# Patient Record
Sex: MALE | Race: WHITE | NOT HISPANIC OR LATINO | Employment: FULL TIME | URBAN - METROPOLITAN AREA
[De-identification: names, ages, dates, MRNs, and addresses within clinical notes are randomized per-mention and may not be internally consistent; named-entity substitution may affect disease eponyms.]

---

## 2019-01-02 ENCOUNTER — TELEPHONE (OUTPATIENT)
Dept: INTERNAL MEDICINE | Facility: CLINIC | Age: 59
End: 2019-01-02

## 2019-01-03 ENCOUNTER — APPOINTMENT (OUTPATIENT)
Dept: GENERAL RADIOLOGY | Facility: CLINIC | Age: 59
End: 2019-01-03
Payer: COMMERCIAL

## 2019-01-03 ENCOUNTER — HOSPITAL ENCOUNTER (EMERGENCY)
Facility: CLINIC | Age: 59
Discharge: HOME | End: 2019-01-03
Attending: EMERGENCY MEDICINE | Admitting: EMERGENCY MEDICINE
Payer: COMMERCIAL

## 2019-01-03 VITALS
DIASTOLIC BLOOD PRESSURE: 82 MMHG | SYSTOLIC BLOOD PRESSURE: 115 MMHG | TEMPERATURE: 97.6 F | RESPIRATION RATE: 28 BRPM | HEART RATE: 83 BPM | OXYGEN SATURATION: 97 %

## 2019-01-03 DIAGNOSIS — J20.9 ACUTE BRONCHITIS, UNSPECIFIED ORGANISM: ICD-10-CM

## 2019-01-03 LAB
ANION GAP SERPL CALCULATED.3IONS-SCNC: 6 MMOL/L (ref 3–14)
BASOPHILS # BLD AUTO: 0.1 10E9/L (ref 0–0.2)
BASOPHILS NFR BLD AUTO: 0.7 %
BUN SERPL-MCNC: 18 MG/DL (ref 7–30)
CALCIUM SERPL-MCNC: 8.2 MG/DL (ref 8.5–10.1)
CHLORIDE SERPL-SCNC: 103 MMOL/L (ref 94–109)
CO2 SERPL-SCNC: 28 MMOL/L (ref 20–32)
CREAT SERPL-MCNC: 0.98 MG/DL (ref 0.66–1.25)
D DIMER PPP FEU-MCNC: 0.3 UG/ML FEU (ref 0–0.5)
DIFFERENTIAL METHOD BLD: NORMAL
EOSINOPHIL # BLD AUTO: 0.2 10E9/L (ref 0–0.7)
EOSINOPHIL NFR BLD AUTO: 2.3 %
ERYTHROCYTE [DISTWIDTH] IN BLOOD BY AUTOMATED COUNT: 11.9 % (ref 10–15)
GFR SERPL CREATININE-BSD FRML MDRD: 84 ML/MIN/{1.73_M2}
GLUCOSE SERPL-MCNC: 256 MG/DL (ref 70–99)
HCT VFR BLD AUTO: 44.7 % (ref 40–53)
HGB BLD-MCNC: 14.9 G/DL (ref 13.3–17.7)
IMM GRANULOCYTES # BLD: 0.1 10E9/L (ref 0–0.4)
IMM GRANULOCYTES NFR BLD: 0.5 %
INTERPRETATION ECG - MUSE: NORMAL
INTERPRETATION ECG - MUSE: NORMAL
LYMPHOCYTES # BLD AUTO: 3.1 10E9/L (ref 0.8–5.3)
LYMPHOCYTES NFR BLD AUTO: 32.3 %
MCH RBC QN AUTO: 29.6 PG (ref 26.5–33)
MCHC RBC AUTO-ENTMCNC: 33.3 G/DL (ref 31.5–36.5)
MCV RBC AUTO: 89 FL (ref 78–100)
MONOCYTES # BLD AUTO: 0.7 10E9/L (ref 0–1.3)
MONOCYTES NFR BLD AUTO: 7.7 %
NEUTROPHILS # BLD AUTO: 5.4 10E9/L (ref 1.6–8.3)
NEUTROPHILS NFR BLD AUTO: 56.5 %
NRBC # BLD AUTO: 0 10*3/UL
NRBC BLD AUTO-RTO: 0 /100
NT-PROBNP SERPL-MCNC: 16 PG/ML (ref 0–900)
PLATELET # BLD AUTO: 315 10E9/L (ref 150–450)
POTASSIUM SERPL-SCNC: 4.1 MMOL/L (ref 3.4–5.3)
RBC # BLD AUTO: 5.03 10E12/L (ref 4.4–5.9)
SODIUM SERPL-SCNC: 137 MMOL/L (ref 133–144)
TROPONIN I SERPL-MCNC: <0.015 UG/L (ref 0–0.04)
WBC # BLD AUTO: 9.5 10E9/L (ref 4–11)

## 2019-01-03 PROCEDURE — 94640 AIRWAY INHALATION TREATMENT: CPT

## 2019-01-03 PROCEDURE — 85025 COMPLETE CBC W/AUTO DIFF WBC: CPT | Performed by: EMERGENCY MEDICINE

## 2019-01-03 PROCEDURE — 99285 EMERGENCY DEPT VISIT HI MDM: CPT | Mod: 25

## 2019-01-03 PROCEDURE — 85379 FIBRIN DEGRADATION QUANT: CPT | Performed by: EMERGENCY MEDICINE

## 2019-01-03 PROCEDURE — 83880 ASSAY OF NATRIURETIC PEPTIDE: CPT | Performed by: EMERGENCY MEDICINE

## 2019-01-03 PROCEDURE — 84484 ASSAY OF TROPONIN QUANT: CPT | Performed by: EMERGENCY MEDICINE

## 2019-01-03 PROCEDURE — 80048 BASIC METABOLIC PNL TOTAL CA: CPT | Performed by: EMERGENCY MEDICINE

## 2019-01-03 PROCEDURE — 25000125 ZZHC RX 250: Performed by: EMERGENCY MEDICINE

## 2019-01-03 PROCEDURE — 93005 ELECTROCARDIOGRAM TRACING: CPT

## 2019-01-03 PROCEDURE — 71046 X-RAY EXAM CHEST 2 VIEWS: CPT

## 2019-01-03 RX ORDER — DOXYCYCLINE 100 MG/1
100 CAPSULE ORAL 2 TIMES DAILY
Qty: 14 CAPSULE | Refills: 0 | Status: SHIPPED | OUTPATIENT
Start: 2019-01-03 | End: 2019-01-10

## 2019-01-03 RX ORDER — ALBUTEROL SULFATE 90 UG/1
2 AEROSOL, METERED RESPIRATORY (INHALATION) EVERY 6 HOURS PRN
Qty: 1 INHALER | Refills: 0 | Status: SHIPPED | OUTPATIENT
Start: 2019-01-03 | End: 2019-02-12

## 2019-01-03 RX ORDER — LIDOCAINE 40 MG/G
CREAM TOPICAL
Status: DISCONTINUED | OUTPATIENT
Start: 2019-01-03 | End: 2019-01-03 | Stop reason: HOSPADM

## 2019-01-03 RX ORDER — ALBUTEROL SULFATE 0.83 MG/ML
2.5 SOLUTION RESPIRATORY (INHALATION) ONCE
Status: COMPLETED | OUTPATIENT
Start: 2019-01-03 | End: 2019-01-03

## 2019-01-03 RX ADMIN — ALBUTEROL SULFATE 2.5 MG: 2.5 SOLUTION RESPIRATORY (INHALATION) at 02:36

## 2019-01-03 ASSESSMENT — ENCOUNTER SYMPTOMS
COUGH: 1
CHEST TIGHTNESS: 1
SHORTNESS OF BREATH: 1

## 2019-01-03 NOTE — ED TRIAGE NOTES
Here for sob started couple days, worst at night. Taking advil cold/sinus with mild relief. Coughing with yellow phlegm. Per patient, has 65% lung capacity, diaphragm not working well. Stated mid upper chest/neck feels tight, feeling strangled. ABCs intact.

## 2019-01-03 NOTE — ED AVS SNAPSHOT
[facility] Emergency Department  [address]  [facility] [address]  Phone:  [phone]  Fax:  [phone]                                    Donis Barraza   MRN: [id number]    Department:  [facility] Emergency Department   Date of Visit:  1/3/2019           After Visit Summary Signature Page    I have received my discharge instructions, and my questions have been answered. I have discussed any challenges I see with this plan with the nurse or doctor.    ..........................................................................................................................................  Patient/Patient Representative Signature      ..........................................................................................................................................  Patient Representative Print Name and Relationship to Patient    ..................................................               ................................................  Date                                   Time    ..........................................................................................................................................  Reviewed by Signature/Title    ...................................................              ..............................................  Date                                               Time          22EPIC Rev 08/18

## 2019-01-03 NOTE — ED PROVIDER NOTES
History     Chief Complaint:  Shortness of Breath    HPI   Donis Barraza is a 58 year old male with a medical history of a hemidiaphragm paralysis who presents with shortness of breath. The patient reports an onset of chest tightness with the sensation of chocking and not being able to get in enough air. Symptoms worse when lying down. He endorses cough. Patient initially tried to go to Urgent Care, but was advised to come to the emergency department for further evaluation and treatment. Denies leg swelling.     Allergies:  No known drug allergies      Medications:    The patient is currently on no regular medications.     Past Medical History:    Hemidiaphragm paralysis - right   Rheumatic fever    Past Surgical History:    Appendectomy  Shoulder surgery - trauma  Tonsillectomy/adenoidectomy    Family History:    Diabetes  Cardiovascular  Eye disorder  Gastrointestinal disease  MI    Social History:  Smoking status: Never smoker  Alcohol use: Yes    PCP: Ethan Casas    Marital Status:   [2]    Review of Systems   Respiratory: Positive for cough, chest tightness and shortness of breath.    Cardiovascular: Negative for leg swelling.   All other systems reviewed and are negative.      Physical Exam   Patient Vitals for the past 24 hrs:   BP Temp Temp src Pulse Resp SpO2   01/03/19 0445 -- -- -- -- -- 97 %   01/03/19 0440 -- -- -- -- -- 95 %   01/03/19 0435 115/82 -- -- 83 -- 98 %   01/03/19 0415 -- -- -- -- -- 93 %   01/03/19 0410 -- -- -- -- -- 96 %   01/03/19 0405 -- -- -- -- -- 94 %   01/03/19 0400 135/91 -- -- 82 -- 98 %   01/03/19 0143 157/108 97.6  F (36.4  C) Oral 102 28 97 %      Physical Exam  Nursing note and vitals reviewed.  Constitutional: Cooperative.   HENT:   Mouth/Throat: Mucous membranes are normal.   Cardiovascular: Normal rate, regular rhythm and normal heart sounds.  No murmur.  Pulmonary/Chest: Tachypneic with increased work of breathing. Decreased breath sounds and the right base.  No rales.   Abdominal: Soft. Normal appearance. There is no tenderness.   Musculoskeletal: No edema of the legs.   Neurological: Alert.   Skin: Skin is warm and dry.   Psychiatric: Normal mood and affect.      Emergency Department Course   Imaging:  Radiographic findings were communicated with the patient who voiced understanding of the findings.    Chest XR, PA & LAT  No acute abnormality.  As read by Radiology.     Laboratory:  CBC: WNL (WBC 9.5, HGB 14.9, )   BMP: Glucose 256 (H), Calcium 8.2 (L) WNL (Creatinine 0.98)    BNP: 16  Troponin (0236): <0.015     D-dimer: 0.3    Interventions:  0236: Proventil 2.5 mg nebulization      Emergency Department Course:  Past medical records, nursing notes, and vitals reviewed.  0222: I performed an exam of the patient and obtained history, as documented above.    0236: Peripheral IV established. Blood drawn for basic laboratory. Troponin and D-dimer obtained. Results as noted above.    Patient was given the above interventions while here in the emergency department.   The patient was sent for a chest XR while in the emergency department, findings above.   0443: I rechecked the patient. Patient feels better after nebulizer.   Patient discharged home with instructions regarding supportive care, medications, and reasons to return. The importance of close follow-up was reviewed.      Impression & Plan    Medical Decision Making:  Donis Barraza is a 58 year old gentleman with known hemidiaphragm paralysis who presents with proactive cough for the last 13 days. He's afebrile and no hypoxia here. After albuterol nebulization, he feels much better. Cardiac markers are normal, speaking against heart failure. Dimer is negative making pulmonary embolism unlikely and chest X-ray is otherwise unremarkable. At this time, given the time course of his symptoms it's reasonable to treat with antibiotics for coverage of atypical bacterial infection. Given his improvement with beta  agonist I would also start albuterol as an outpatient.      Diagnosis:    ICD-10-CM   1. Acute bronchitis, unspecified organism J20.9     Disposition:  discharged to home    Discharge Medications:  albuterol 108 (90 Base) MCG/ACT inhaler  Commonly known as:  PROAIR HFA/PROVENTIL HFA/VENTOLIN HFA  2 puffs, Inhalation, EVERY 6 HOURS PRN     doxycycline hyclate 100 MG capsule  Commonly known as:  VIBRAMYCIN  100 mg, Oral, 2 TIMES DAILY       Nancy Avila  1/3/2019   [facility] EMERGENCY DEPARTMENT  I, Nancy Avila, am serving as a scribe at 2:22 AM on 1/3/2019 to document services personally performed by Noel Reyes MD based on my observations and the provider's statements to me.       Noel Reyes MD  01/03/19 0615

## 2019-01-21 ENCOUNTER — ANCILLARY PROCEDURE (OUTPATIENT)
Dept: GENERAL RADIOLOGY | Facility: CLINIC | Age: 59
End: 2019-01-21
Payer: COMMERCIAL

## 2019-01-21 ENCOUNTER — OFFICE VISIT (OUTPATIENT)
Dept: INTERNAL MEDICINE | Facility: CLINIC | Age: 59
End: 2019-01-21
Payer: COMMERCIAL

## 2019-01-21 VITALS
HEART RATE: 102 BPM | DIASTOLIC BLOOD PRESSURE: 80 MMHG | HEIGHT: 67 IN | OXYGEN SATURATION: 97 % | RESPIRATION RATE: 26 BRPM | SYSTOLIC BLOOD PRESSURE: 110 MMHG | TEMPERATURE: 98.3 F | WEIGHT: 253.8 LBS | BODY MASS INDEX: 39.83 KG/M2

## 2019-01-21 DIAGNOSIS — J21.9 ACUTE BRONCHIOLITIS WITH BRONCHOSPASM: Primary | ICD-10-CM

## 2019-01-21 DIAGNOSIS — R05.9 COUGH: ICD-10-CM

## 2019-01-21 DIAGNOSIS — J20.9 ACUTE BRONCHITIS, UNSPECIFIED ORGANISM: ICD-10-CM

## 2019-01-21 PROCEDURE — 71046 X-RAY EXAM CHEST 2 VIEWS: CPT

## 2019-01-21 PROCEDURE — 99214 OFFICE O/P EST MOD 30 MIN: CPT | Performed by: INTERNAL MEDICINE

## 2019-01-21 RX ORDER — PREDNISONE 10 MG/1
TABLET ORAL
Qty: 13 TABLET | Refills: 0 | Status: SHIPPED | OUTPATIENT
Start: 2019-01-21 | End: 2019-02-12

## 2019-01-21 RX ORDER — GUAIFENESIN 600 MG/1
1200 TABLET, EXTENDED RELEASE ORAL 2 TIMES DAILY
Qty: 40 TABLET | Refills: 0 | Status: SHIPPED | OUTPATIENT
Start: 2019-01-21 | End: 2019-01-31

## 2019-01-21 RX ORDER — CODEINE PHOSPHATE AND GUAIFENESIN 10; 100 MG/5ML; MG/5ML
1-2 SOLUTION ORAL EVERY 4 HOURS PRN
Qty: 250 ML | Refills: 0 | Status: SHIPPED | OUTPATIENT
Start: 2019-01-21 | End: 2019-02-12

## 2019-01-21 RX ORDER — CEFDINIR 300 MG/1
300 CAPSULE ORAL 2 TIMES DAILY
Qty: 20 CAPSULE | Refills: 0 | Status: SHIPPED | OUTPATIENT
Start: 2019-01-21 | End: 2019-01-31

## 2019-01-21 RX ORDER — ALBUTEROL SULFATE 1.25 MG/3ML
1.25 SOLUTION RESPIRATORY (INHALATION) EVERY 6 HOURS PRN
Qty: 40 VIAL | Refills: 1 | Status: SHIPPED | OUTPATIENT
Start: 2019-01-21 | End: 2019-02-20

## 2019-01-21 ASSESSMENT — MIFFLIN-ST. JEOR: SCORE: 1934.86

## 2019-01-21 NOTE — PROGRESS NOTES
"Dr Leal's note      Patient's instructions / PLAN:                                                      Plan:  1. Cefnidir 300 mg twice a day for 10 days - antibiotic   2. Mucinex 1200 mg twice a day   3. Robitussin with Codeine 1-2 teaspoons every 4-6 hours as needed for severe cough   4. Albuterol nebulizer  5. Prednisone 10 mg - take it in the morning  -- day 1-4, take 2 tablets daily  -- day 5-8, take 1 tab daily  -- day 9-10 take 1/2 tab daily then stop   6. Make an appointment for physical exam               ASSESSMENT & PLAN:                                                      (J21.9) Acute bronchiolitis with bronchospasm  (primary encounter diagnosis)  Comment: I reviewed the CXR myself: no pneumonia, but high R diaphragm   Plan: XR Chest 2 Views, cefdinir (OMNICEF) 300 MG         capsule, guaiFENesin (MUCINEX) 600 MG 12 hr         tablet, order for DME, guaiFENesin-codeine         (ROBITUSSIN AC) 100-10 MG/5ML solution,         albuterol (ACCUNEB) 1.25 MG/3ML neb solution,         predniSONE (DELTASONE) 10 MG tablet            (R05) Cough  Comment:   Plan: XR Chest 2 Views               Chief complaint:                                                           SUBJECTIVE:   Donis Barraza is a 58 year old male who presents to clinic today for the following health issues:    Cough  Symptoms started before Paulette.  Seen in ER and he felt better in ER after nebs. Doxyc and Alb inh were presribed   He was prescribed ALbuterol inh but he doesn't find much help  He has been using \" too many\" OTC meds with no help    Some yellow phlegm, some white phlegm          *URI-New pt. [place] like he had improved somewhat while on the doxycycline, but feels like he is going downhill again. Has been using albuterol (doesn't feel like it's helping), was taking a lot of Advil cold and sinus (that seemed to help, but he felt like he was taking too much of it).     FYI-his son is in the hospital right now with similar " "symptoms, and has had to have 4 Liters fluid removed from his abdominal area and they are worried about fluid accumulation around the heart (says there is no hx of liver or heart issues).           Review of Systems:                                                      ROS: negative for fever, chills, cough, wheezes, chest pain, shortness of breath, vomiting, abdominal pain, leg swelling pos for cough, SOB, wheezes , as above     A 10-point review of systems was obtained.  Those pertinent are above and in the in the Subjective section.  The rest of the systems are negative.           OBJECTIVE:             Physical exam:  Blood pressure 110/80, pulse 102, temperature 98.3  F (36.8  C), temperature source Oral, resp. rate 26, height 1.702 m (5' 7\"), weight 115.1 kg (253 lb 12.8 oz), SpO2 97 %.   NAD, appears comfortable  Skin: no rashes   HEENT: PERRLA, EOMI, pink conjunctiva, anicteric sclerae, bilateral tympanic membranes are clinically normal, oropharynx is normal color  Neck: supple, no JVD,  No thyroidmegaly. Lymph nodes nonpalpable cervical and supraclavicular.  Chest:  decreased breath sounds R base, he has paralysed diaphragm here, very freq cough, sounds dry   Heart: S1 S2, RRR, no mgr appreciated  Abdomen: soft, not tender, no hepatosplenomegaly or masses appreciated, no abdominal bruit, present bowel sounds  Extremities: no edema,   Neurologic: A, Ox3, no focal signs appreciated    PMHx: reviewed  Past Medical History:   Diagnosis Date     h/o Rheumatic fever      Hemidiaphragm paralysis - right       PSHx: reviewed  Past Surgical History:   Procedure Laterality Date     C APPENDECTOMY  1980's     SHOULDER SURGERY Left 2007    s/p trauma     TONSILLECTOMY, ADENOIDECTOMY, COMBINED          Meds: reviewed  Current Outpatient Medications   Medication Sig Dispense Refill     albuterol (PROAIR HFA/PROVENTIL HFA/VENTOLIN HFA) 108 (90 Base) MCG/ACT inhaler Inhale 2 puffs into the lungs every 6 hours as needed for " shortness of breath / dyspnea or wheezing 1 Inhaler 0     NO ACTIVE MEDICATIONS          Soc Hx: reviewed  Saji Hx: reviewed          Malini Leal MD  Internal Medicine

## 2019-01-21 NOTE — NURSING NOTE
"/80 (BP Location: Right arm, Patient Position: Sitting, Cuff Size: Adult Large)   Pulse 102   Temp 98.3  F (36.8  C) (Oral)   Resp 26   Ht 1.702 m (5' 7\")   Wt 115.1 kg (253 lb 12.8 oz)   SpO2 97%   BMI 39.75 kg/m    Colonoscopy discussed-he has family history of polyps (dad and two brothers), he is unsure of when his last colonoscopy was done. Pt advised this would be recommended for him.  Brenda Le CMA    "

## 2019-01-21 NOTE — PATIENT INSTRUCTIONS
Plan:  1. Cefnidir 300 mg twice a day for 10 days - antibiotic   2. Mucinex 1200 mg twice a day   3. Robitussin with Codeine 1-2 teaspoons every 4-6 hours as needed for severe cough   4. Albuterol nebulizer  5. Prednisone 10 mg - take it in the morning  -- day 1-4, take 2 tablets daily  -- day 5-8, take 1 tab daily  -- day 9-10 take 1/2 tab daily then stop   6. Make an appointment for physical exam

## 2019-02-02 ENCOUNTER — NURSE TRIAGE (OUTPATIENT)
Dept: NURSING | Facility: CLINIC | Age: 59
End: 2019-02-02

## 2019-02-02 ENCOUNTER — OFFICE VISIT (OUTPATIENT)
Dept: FAMILY MEDICINE | Facility: CLINIC | Age: 59
End: 2019-02-02
Payer: COMMERCIAL

## 2019-02-02 VITALS
HEIGHT: 68 IN | OXYGEN SATURATION: 98 % | BODY MASS INDEX: 38.49 KG/M2 | TEMPERATURE: 97.9 F | HEART RATE: 90 BPM | RESPIRATION RATE: 18 BRPM | SYSTOLIC BLOOD PRESSURE: 110 MMHG | DIASTOLIC BLOOD PRESSURE: 80 MMHG | WEIGHT: 254 LBS

## 2019-02-02 DIAGNOSIS — Z12.5 SCREENING FOR PROSTATE CANCER: ICD-10-CM

## 2019-02-02 DIAGNOSIS — R06.02 SOB (SHORTNESS OF BREATH): ICD-10-CM

## 2019-02-02 DIAGNOSIS — E66.01 CLASS 2 SEVERE OBESITY DUE TO EXCESS CALORIES WITH SERIOUS COMORBIDITY AND BODY MASS INDEX (BMI) OF 38.0 TO 38.9 IN ADULT (H): ICD-10-CM

## 2019-02-02 DIAGNOSIS — E66.812 CLASS 2 SEVERE OBESITY DUE TO EXCESS CALORIES WITH SERIOUS COMORBIDITY AND BODY MASS INDEX (BMI) OF 38.0 TO 38.9 IN ADULT (H): ICD-10-CM

## 2019-02-02 DIAGNOSIS — Z12.11 SPECIAL SCREENING FOR MALIGNANT NEOPLASMS, COLON: ICD-10-CM

## 2019-02-02 DIAGNOSIS — J40 BRONCHITIS: Primary | ICD-10-CM

## 2019-02-02 DIAGNOSIS — E11.9 TYPE 2 DIABETES MELLITUS WITHOUT COMPLICATION, WITHOUT LONG-TERM CURRENT USE OF INSULIN (H): ICD-10-CM

## 2019-02-02 DIAGNOSIS — J01.90 SUBACUTE SINUSITIS, UNSPECIFIED LOCATION: ICD-10-CM

## 2019-02-02 DIAGNOSIS — J98.6 DIAPHRAGM PARALYSIS: ICD-10-CM

## 2019-02-02 DIAGNOSIS — Z11.59 NEED FOR HEPATITIS C SCREENING TEST: ICD-10-CM

## 2019-02-02 DIAGNOSIS — Z78.9 NONSMOKER: ICD-10-CM

## 2019-02-02 DIAGNOSIS — E78.2 MIXED HYPERLIPIDEMIA: ICD-10-CM

## 2019-02-02 LAB
BASOPHILS # BLD AUTO: 0 10E9/L (ref 0–0.2)
BASOPHILS NFR BLD AUTO: 0.4 %
DIFFERENTIAL METHOD BLD: NORMAL
EOSINOPHIL # BLD AUTO: 0.1 10E9/L (ref 0–0.7)
EOSINOPHIL NFR BLD AUTO: 1.3 %
ERYTHROCYTE [DISTWIDTH] IN BLOOD BY AUTOMATED COUNT: 12.5 % (ref 10–15)
HBA1C MFR BLD: 8.2 % (ref 0–5.6)
HCT VFR BLD AUTO: 43.9 % (ref 40–53)
HGB BLD-MCNC: 15 G/DL (ref 13.3–17.7)
LYMPHOCYTES # BLD AUTO: 2.5 10E9/L (ref 0.8–5.3)
LYMPHOCYTES NFR BLD AUTO: 28.3 %
MCH RBC QN AUTO: 29.4 PG (ref 26.5–33)
MCHC RBC AUTO-ENTMCNC: 34.2 G/DL (ref 31.5–36.5)
MCV RBC AUTO: 86 FL (ref 78–100)
MONOCYTES # BLD AUTO: 0.6 10E9/L (ref 0–1.3)
MONOCYTES NFR BLD AUTO: 7.1 %
NEUTROPHILS # BLD AUTO: 5.7 10E9/L (ref 1.6–8.3)
NEUTROPHILS NFR BLD AUTO: 62.9 %
PLATELET # BLD AUTO: 234 10E9/L (ref 150–450)
RBC # BLD AUTO: 5.1 10E12/L (ref 4.4–5.9)
WBC # BLD AUTO: 9 10E9/L (ref 4–11)

## 2019-02-02 PROCEDURE — G0103 PSA SCREENING: HCPCS | Performed by: FAMILY MEDICINE

## 2019-02-02 PROCEDURE — 80048 BASIC METABOLIC PNL TOTAL CA: CPT | Performed by: FAMILY MEDICINE

## 2019-02-02 PROCEDURE — 80061 LIPID PANEL: CPT | Performed by: FAMILY MEDICINE

## 2019-02-02 PROCEDURE — 84460 ALANINE AMINO (ALT) (SGPT): CPT | Performed by: FAMILY MEDICINE

## 2019-02-02 PROCEDURE — 86803 HEPATITIS C AB TEST: CPT | Performed by: FAMILY MEDICINE

## 2019-02-02 PROCEDURE — 36415 COLL VENOUS BLD VENIPUNCTURE: CPT | Performed by: FAMILY MEDICINE

## 2019-02-02 PROCEDURE — 99215 OFFICE O/P EST HI 40 MIN: CPT | Performed by: FAMILY MEDICINE

## 2019-02-02 PROCEDURE — 83036 HEMOGLOBIN GLYCOSYLATED A1C: CPT | Performed by: FAMILY MEDICINE

## 2019-02-02 PROCEDURE — 85025 COMPLETE CBC W/AUTO DIFF WBC: CPT | Performed by: FAMILY MEDICINE

## 2019-02-02 RX ORDER — BUDESONIDE 1 MG/2ML
1 INHALANT ORAL 2 TIMES DAILY
Qty: 60 AMPULE | Refills: 1 | Status: SHIPPED | OUTPATIENT
Start: 2019-02-02 | End: 2020-05-27

## 2019-02-02 ASSESSMENT — MIFFLIN-ST. JEOR: SCORE: 1951.64

## 2019-02-02 NOTE — PATIENT INSTRUCTIONS
1.  Run a cold air vaporizer as much as possible. If you cannot,  boil water and breath the warm vapors 2-3 times a day to try to open up the sinuses take 2400mgm of guaifenesin per 24 hours   You can do this by taking  Mucinex plain blue  1200 mg  One tablet twice a day (This may come as 600mg/tablet and you need to take 2 tabs twice a day) or you could buy the cheaper  generic 400mgm / tab and take 2 tablets 3 x a day or 1 and 1/2 tablets 4 x a day . .Guaifenesin is  the major component of most cough syrups, because it makes the mucus less thick, and therefore it drains out better and you are less likely to cough from it dripping on the back of your throat.  Irrigate the  nose with plain water under the kitchen sink faucet or the shower.  Coty pots, spray bottles, etc accumulate bacteria and are not recommended.   The tickle in the throat is also helped by gargling with vinegar and honey mixture, or pop or mouth wash as these coat the throat.  Please try to rinse teeth with water after using these .   Do not use sudafed or pseudephedrine as it dries the mucus up so it is harder to get it out, and it can raise your BP     2.  Weight Loss Tips  1. Do not eat after 6 hrs before your expected bedtime  2. Have your heaviest meal for breakfast, a slightly lighter meal at lunch and a snack 6 hrs before bed  3. No sugar/calorie drinks except milk ie no fruit juice, pop, alcohol.  4. Drink milk 30min before meals to decrease your hunger. Also it is excellent as part of your last meal of the day snack  5. Drink lots of water  6. Increase fiber in diet: all bran cereal, salads, popcorn etc  7. Have only one small serving of fruit a day about 1/2 cup (as this is high in sugar)  8. EXERCISE is the bottom line. Without it, you will gain weight even on a low calorie diet. Best if done 2-3X a day as can    Being overweight contributes to high blood pressure and high cholesterol, both of which cause heart attacks, strokes and  kidney failure, prediabetes and diabetes, arthritis, and liver disease     3. Shingrex is a 2 shot series that prevents shingles 97% of the time, as opposed to the old shingles shot that only prevented it at 40-50%  It costs less for medicare at a pharmacy  You should get it starting at 50 yrs old get the 2nd shot 5-6 mo after the first one    4. Cont to use the albuterol at least 2 a time per day up to 4 x a day     Follow the am & pm albuterol with the budesonide ( steroid)

## 2019-02-02 NOTE — NURSING NOTE
"Chief Complaint   Patient presents with     URI     /80   Pulse 90   Temp 97.9  F (36.6  C) (Tympanic)   Resp 18   Ht 1.727 m (5' 8\")   Wt 115.2 kg (254 lb)   SpO2 98%   BMI 38.62 kg/m   Estimated body mass index is 38.62 kg/m  as calculated from the following:    Height as of this encounter: 1.727 m (5' 8\").    Weight as of this encounter: 115.2 kg (254 lb).  BP completed using cuff size: shawanda Carcamo CMA    Health Maintenance Due   Topic Date Due     PHQ-2 Q1 YR  03/22/1973     HIV SCREEN (SYSTEM ASSIGNED)  03/22/1979     HEPATITIS C SCREENING  03/22/1979     ZOSTER IMMUNIZATION (1 of 2) 03/22/2011     LIPID SCREENING Q5 YEARS  05/24/2018     Health Maintenance reviewed at today's visit patient asked to schedule/complete:   Depression:  Patient agrees to schedule  Immunizations:  Patient agrees to schedule    "

## 2019-02-02 NOTE — TELEPHONE ENCOUNTER
Reason for call;  Productive cough with coughing spells that caused Shortness of breath and  and stuffy nose since Christmas .   Seen on 1/21/19 Dx Bronchitolitis W Bronchospasm @ [facility]  .   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    Has  4 doses left of Albuterol Neb solution  Left  .  Currently :  no fever , but  pushing fluids - 1&0 is ok , sinus pressure and mild pain  and stuffy nose .  Reviewed office visit plan and discuss and Pt would like to have appointment to evaluate sinus pressure/ pain and stuffy nose and see if anything further could be done for him .   Recommendation / teaching ; Sent to .      Caller Verbalizes understanding and denies further questions and will call back if  Triage requested  or questions  .  Ira Chaves RN  - [place] Nurse Advisor

## 2019-02-02 NOTE — LETTER
February 6, 2019      Donis Barraza  [address] [facility] [address]        Dear ,    We are writing to inform you of your test results.    All of your lab results are normal, except as noted below.     The following are explanations of some of our lab tests     THIS DOES NOT MEAN THAT YOU HAD ALL OF THESE DONE     Please continue on the same medications unless   a change is noted above     These are some general explanations for tests:     Hgb is the blood iron level   WBC means White Blood Cells   Platelets are small blood cells that help with forming the blood clots along with other blood factors.   Electrolytes ar   e Sodium, Potassium, Calcium, Magnesium, Phosphorus.   Liver tests are: AST, ALT, Bilirubin, Alkaline Phosphatase.   Kidney tests are Creatinine, GFR.   HDL Cholesterol - is the good cholesterol and it is good to have it high.   LDL cholesterol is the bad ch   olesterol and it is good to have it low.   It is recommended to have LDL less than 130 for people with hypertension and to have it less than 100 for people with heart disease, diabetes and chronic kidney disease.   Triglycerides are another type of lipid a   nd can also cause heart disease so should be kept low.     ###These lipids or cholesterols are too high  We need to discuss them ###     Thyroid tests are TSH, T4, T3   Glucose is sugar   A1c is a test that gives us an idea about how well was controlled the diabetes for the last 3 months.   ###your diabetes is out of control###we need to discuss this and where we want to go from here ###     PSA stands for Prostate Specific Antigen and    it can be elevated with prostate cancer or prostate inflammation    Resulted Orders   Hepatitis C Screen Reflex to HCV RNA Quant and Genotype   Result Value Ref Range    Hepatitis C Antibody Nonreactive NR^Nonreactive      Comment:      Assay performance characteristics have not been established for newborns,   infants, and children      Lipid panel reflex to direct LDL Fasting   Result Value Ref Range    Cholesterol 195 <200 mg/dL    Triglycerides 211 (H) <150 mg/dL      Comment:      Borderline high:  150-199 mg/dl  High:             200-499 mg/dl  Very high:       >499 mg/dl  Non Fasting      HDL Cholesterol 36 (L) >39 mg/dL    LDL Cholesterol Calculated 117 (H) <100 mg/dL      Comment:      Above desirable:  100-129 mg/dl  Borderline High:  130-159 mg/dL  High:             160-189 mg/dL  Very high:       >189 mg/dl      Non HDL Cholesterol 159 (H) <130 mg/dL      Comment:      Above Desirable:  130-159 mg/dl  Borderline high:  160-189 mg/dl  High:             190-219 mg/dl  Very high:       >219 mg/dl     Basic metabolic panel   Result Value Ref Range    Sodium 137 133 - 144 mmol/L    Potassium 4.5 3.4 - 5.3 mmol/L    Chloride 103 94 - 109 mmol/L    Carbon Dioxide 29 20 - 32 mmol/L    Anion Gap 5 3 - 14 mmol/L    Glucose 144 (H) 70 - 99 mg/dL      Comment:      Non Fasting    Urea Nitrogen 18 7 - 30 mg/dL    Creatinine 0.92 0.66 - 1.25 mg/dL    GFR Estimate >90 >60 mL/min/[1.73_m2]      Comment:      Non  GFR Calc  Starting 12/18/2018, serum creatinine based estimated GFR (eGFR) will be   calculated using the Chronic Kidney Disease Epidemiology Collaboration   (CKD-EPI) equation.      GFR Estimate If Black >90 >60 mL/min/[1.73_m2]      Comment:       GFR Calc  Starting 12/18/2018, serum creatinine based estimated GFR (eGFR) will be   calculated using the Chronic Kidney Disease Epidemiology Collaboration   (CKD-EPI) equation.      Calcium 9.0 8.5 - 10.1 mg/dL   CBC with platelets differential   Result Value Ref Range    WBC 9.0 4.0 - 11.0 10e9/L    RBC Count 5.10 4.4 - 5.9 10e12/L    Hemoglobin 15.0 13.3 - 17.7 g/dL    Hematocrit 43.9 40.0 - 53.0 %    MCV 86 78 - 100 fl    MCH 29.4 26.5 - 33.0 pg    MCHC 34.2 31.5 - 36.5 g/dL    RDW 12.5 10.0 - 15.0 %    Platelet Count 234 150 - 450 10e9/L    % Neutrophils 62.9 %     % Lymphocytes 28.3 %    % Monocytes 7.1 %    % Eosinophils 1.3 %    % Basophils 0.4 %    Absolute Neutrophil 5.7 1.6 - 8.3 10e9/L    Absolute Lymphocytes 2.5 0.8 - 5.3 10e9/L    Absolute Monocytes 0.6 0.0 - 1.3 10e9/L    Absolute Eosinophils 0.1 0.0 - 0.7 10e9/L    Absolute Basophils 0.0 0.0 - 0.2 10e9/L    Diff Method Automated Method    ALT   Result Value Ref Range    ALT 41 0 - 70 U/L   Hemoglobin A1c   Result Value Ref Range    Hemoglobin A1C 8.2 (H) 0 - 5.6 %      Comment:      Normal <5.7% Prediabetes 5.7-6.4%  Diabetes 6.5% or higher - adopted from ADA   consensus guidelines.     Prostate spec antigen screen   Result Value Ref Range    PSA 0.43 0 - 4 ug/L      Comment:      Assay Method:  Chemiluminescence using Siemens Vista analyzer       If you have any questions or concerns, please call the clinic at the number listed above.       Sincerely,        Bere Middleton MD

## 2019-02-02 NOTE — PROGRESS NOTES
"  SUBJECTIVE:   Donis Barraza is a 58 year old male who presents to clinic today for the following health issues:    ENT Symptoms             Symptoms: cc Present Absent Comment   Fever/Chills   x    Fatigue   x    Muscle Aches   x    Eye Irritation   x    Sneezing   x    Nasal Bautista/Drg  x     Sinus Pressure/Pain  x     Loss of smell   x    Dental pain   x    Sore Throat   x    Swollen Glands   x    Ear Pain/Fullness   x    Cough  x     Wheeze   x    Chest Pain   x    Shortness of breath  x     Rash   x    Other   x      Symptom duration:  11/2018   Symptom severity:  Severe   Treatments tried:  Mucinex, Advil, Nebulizer, Robitussin, Cefnidir and Prednisone start 10mgm [id number]--no improvement   Prednisone-->irritable and hyper    Contacts:  Son-hospital till 2-1-19--29 y/o pericarditis   PFTs ? 2011? Showed \"65% lung volume\" per pt   Denies recurent bronchitis but was bad enough to be tested then   ELEVATED RT HEMIDIAPHRAGM  -post fall in 2006    NONMORBID OBESITY    -morbid with comorbid DM, hi LDL   -sedentary   -BMI=39    Diabetes -DX today with A1C= 8.2      Patient is checking blood sugars: not at all    Random glu = 255 on 1-3-19    Diabetic concerns: None     Symptoms of hypoglycemia (low blood sugar): none     Paresthesias (numbness or burning in feet) or sores: No     Date of last diabetic eye exam: ?    BP Readings from Last 2 Encounters:   02/02/19 110/80   01/21/19 110/80     Hemoglobin A1C (%)   Date Value   02/02/2019 8.2 (H)     LDL Cholesterol Calculated (mg/dL)   Date Value   05/24/2013 157 (H)   02/11/2008 164 (H)       Diabetes Management Resources  Mixed Hyperlipidemia Follow-Up      Rate your low fat/cholesterol diet?: not monitoring fat    Taking statin?  No    Other lipid medications/supplements?:  None    psat yrwk=736&HTJ=392 in 2013      Problem list and histories reviewed & adjusted, as indicated.  Additional history: as documented    Labs reviewed in EPIC    Reviewed and updated as " "needed this visit by clinical staff  Tobacco  Allergies  Meds  Problems  Med Hx  Surg Hx  Fam Hx  Soc Hx        Reviewed and updated as needed this visit by Provider  Tobacco  Allergies  Meds  Problems  Med Hx  Surg Hx  Fam Hx         ROS:  CONSTITUTIONAL: NEGATIVE for fever, chills, change in weight  INTEGUMENTARY/SKIN: NEGATIVE for worrisome rashes, moles or lesions  EYES: NEGATIVE for vision changes or irritation  ENT/MOUTH: POSITIVE for}, hoarse voice, nasal congestion, postnasal drainage and rhinorrhea-clear  RESP:POSITIVE for , cough-productive and dyspnea on exertion  BREAST: NEGATIVE for masses, tenderness or discharge  CV: NEGATIVE for chest pain, palpitations or peripheral edema  GI: NEGATIVE for nausea, abdominal pain, heartburn, or change in bowel habits  : NEGATIVE for frequency, dysuria, or hematuria  MUSCULOSKELETAL: NEGATIVE for significant arthralgias or myalgia  NEURO: NEGATIVE for weakness, dizziness or paresthesias  ENDOCRINE: NEGATIVE for temperature intolerance, skin/hair changes  HEME: NEGATIVE for bleeding problems  PSYCHIATRIC: NEGATIVE for changes in mood or affect    OBJECTIVE:     /80   Pulse 90   Temp 97.9  F (36.6  C) (Tympanic)   Resp 18   Ht 1.727 m (5' 8\")   Wt 115.2 kg (254 lb)   SpO2 98%   BMI 38.62 kg/m    Body mass index is 38.62 kg/m .  GENERAL: healthy, alert, no distress and fatigued  EYES: Eyes grossly normal to inspection, PERRL and conjunctivae and sclerae normal  HENT: ear canals and TM's normal, nose and mouth without ulcers or lesions  POS long uvula  NECK: no adenopathy, no asymmetry, masses, or scars and thyroid normal to palpation  RESP: lungs clear to auscultation - no rales, rhonchi or wheezes POS barrel chested--cough clears the lungs   CV: regular rate and rhythm, normal S1 S2, no S3 or S4, no murmur, click or rub, no peripheral edema and peripheral pulses strong  MS: no gross musculoskeletal defects noted, no edema  SKIN: no " suspicious lesions or rashes  NEURO: Normal strength and tone, mentation intact and speech normal  PSYCH: mentation appears normal, affect normal/bright  LYMPH: no cervical, supraclavicular, axillary, or inguinal adenopathy    Diagnostic Test Results:  Results for orders placed or performed in visit on 02/02/19   CBC with platelets differential   Result Value Ref Range    WBC 9.0 4.0 - 11.0 10e9/L    RBC Count 5.10 4.4 - 5.9 10e12/L    Hemoglobin 15.0 13.3 - 17.7 g/dL    Hematocrit 43.9 40.0 - 53.0 %    MCV 86 78 - 100 fl    MCH 29.4 26.5 - 33.0 pg    MCHC 34.2 31.5 - 36.5 g/dL    RDW 12.5 10.0 - 15.0 %    Platelet Count 234 150 - 450 10e9/L    % Neutrophils 62.9 %    % Lymphocytes 28.3 %    % Monocytes 7.1 %    % Eosinophils 1.3 %    % Basophils 0.4 %    Absolute Neutrophil 5.7 1.6 - 8.3 10e9/L    Absolute Lymphocytes 2.5 0.8 - 5.3 10e9/L    Absolute Monocytes 0.6 0.0 - 1.3 10e9/L    Absolute Eosinophils 0.1 0.0 - 0.7 10e9/L    Absolute Basophils 0.0 0.0 - 0.2 10e9/L    Diff Method Automated Method    Hemoglobin A1c   Result Value Ref Range    Hemoglobin A1C 8.2 (H) 0 - 5.6 %       ASSESSMENT/PLAN:               ICD-10-CM    1. Bronchitis subacute onset 11-18 J40 CBC with platelets differential     PULMONARY MEDICINE REFERRAL   2. Nonsmoker Z78.9    3. 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 Subacute sinusitis, unspecified location J01.90    5. SOB (shortness of breath) R06.02 CBC with platelets differential     budesonide (PULMICORT) 1 MG/2ML neb solution     PULMONARY MEDICINE REFERRAL   6. Type 2 diabetes mellitus without complication, without long-term current use of insulin (H)-new Dx  w hyperglycemia  E11.9    7. Mixed hyperlipidemia E78.2 Lipid panel reflex to direct LDL Fasting     ALT   8. Class 2 severe obesity due to excess calories with serious comorbidity and body mass index (BMI) of 38.0 to 38.9 in adult (H) E66.01 ALT    Z68.38    9. Special screening for malignant neoplasms, colon  Z12.11 Fecal colorectal cancer screen FIT   10. Screening for prostate cancer Z12.5 Prostate spec antigen screen   11. Need for hepatitis C screening test Z11.59 Hepatitis C Screen Reflex to HCV RNA Quant and Genotype       Patient Instructions   1.  Run a cold air vaporizer as much as possible. If you cannot,  boil water and breath the warm vapors 2-3 times a day to try to open up the sinuses take 2400mgm of guaifenesin per 24 hours   You can do this by taking  Mucinex plain blue  1200 mg  One tablet twice a day (This may come as 600mg/tablet and you need to take 2 tabs twice a day) or you could buy the cheaper  generic 400mgm / tab and take 2 tablets 3 x a day or 1 and 1/2 tablets 4 x a day . .Guaifenesin is  the major component of most cough syrups, because it makes the mucus less thick, and therefore it drains out better and you are less likely to cough from it dripping on the back of your throat.  Irrigate the  nose with plain water under the kitchen sink faucet or the shower.  Coty pots, spray bottles, etc accumulate bacteria and are not recommended.   The tickle in the throat is also helped by gargling with vinegar and honey mixture, or pop or mouth wash as these coat the throat.  Please try to rinse teeth with water after using these .   Do not use sudafed or pseudephedrine as it dries the mucus up so it is harder to get it out, and it can raise your BP     2.  Weight Loss Tips  1. Do not eat after 6 hrs before your expected bedtime  2. Have your heaviest meal for breakfast, a slightly lighter meal at lunch and a snack 6 hrs before bed  3. No sugar/calorie drinks except milk ie no fruit juice, pop, alcohol.  4. Drink milk 30min before meals to decrease your hunger. Also it is excellent as part of your last meal of the day snack  5. Drink lots of water  6. Increase fiber in diet: all bran cereal, salads, popcorn etc  7. Have only one small serving of fruit a day about 1/2 cup (as this is high in sugar)  8.  "EXERCISE is the bottom line. Without it, you will gain weight even on a low calorie diet. Best if done 2-3X a day as can    Being overweight contributes to high blood pressure and high cholesterol, both of which cause heart attacks, strokes and kidney failure, prediabetes and diabetes, arthritis, and liver disease     3. Shingrex is a 2 shot series that prevents shingles 97% of the time, as opposed to the old shingles shot that only prevented it at 40-50%  It costs less for medicare at a pharmacy  You should get it starting at 50 yrs old get the 2nd shot 5-6 mo after the first one    4. Cont to use the albuterol at least 2 a time per day up to 4 x a day     Follow the am & pm albuterol with the budesonide ( steroid)       Bere Middleton MD  [facility]    Weight management plan: Discussed healthy diet and exercise guidelines    Time spent with the patient 45mins, more than 50% in counseling and coordinating care, Re above medical problems.    1. BRONCHITIS   Onset 11-18  -denies URIS in past but must have as had:  -\"lung function at 65%\" in past ?2011  -nonsmoker  -paralyzed and elevated Rt diaphragm but not enough to cause problems   -no  Help with abx and low dose (10mgm ) prednisone for 10 d  -CXR wnl 1-21-19  --will need PFTs when get him under better control   -pt refuses prednisone so will give per neb    2. REACTION TO PREDNISONE  -felt irritable and wakeful on it   -refuses to take more   -was only on a low dose, starting at 10mgm   -will do by nebs     ,I  Explained the treatment and the reason for it   As per above instructions    3. HI GLUCOSE --> DIABETES   -per todays A1C = 8.4  -pt told in past \"was\" or would become diabetic   -contributes to chronic infection eg respiratory    -comorbid obesity & hi LDL     -needs further Rx of all these              Bere Middleton MD    "

## 2019-02-04 LAB
ALT SERPL W P-5'-P-CCNC: 41 U/L (ref 0–70)
ANION GAP SERPL CALCULATED.3IONS-SCNC: 5 MMOL/L (ref 3–14)
BUN SERPL-MCNC: 18 MG/DL (ref 7–30)
CALCIUM SERPL-MCNC: 9 MG/DL (ref 8.5–10.1)
CHLORIDE SERPL-SCNC: 103 MMOL/L (ref 94–109)
CHOLEST SERPL-MCNC: 195 MG/DL
CO2 SERPL-SCNC: 29 MMOL/L (ref 20–32)
CREAT SERPL-MCNC: 0.92 MG/DL (ref 0.66–1.25)
GFR SERPL CREATININE-BSD FRML MDRD: >90 ML/MIN/{1.73_M2}
GLUCOSE SERPL-MCNC: 144 MG/DL (ref 70–99)
HCV AB SERPL QL IA: NONREACTIVE
HDLC SERPL-MCNC: 36 MG/DL
LDLC SERPL CALC-MCNC: 117 MG/DL
NONHDLC SERPL-MCNC: 159 MG/DL
POTASSIUM SERPL-SCNC: 4.5 MMOL/L (ref 3.4–5.3)
PSA SERPL-ACNC: 0.43 UG/L (ref 0–4)
SODIUM SERPL-SCNC: 137 MMOL/L (ref 133–144)
TRIGL SERPL-MCNC: 211 MG/DL

## 2019-02-06 NOTE — RESULT ENCOUNTER NOTE
Please see attached lab results  All of your lab results are normal, except as noted below.    The following are explanations of some of our lab tests    THIS DOES NOT MEAN THAT YOU HAD ALL OF THESE DONE    Please continue on the same medications unless   a change is noted above    These are some general explanations for tests:    Hgb is the blood iron level  WBC means White Blood Cells  Platelets are small blood cells that help with forming the blood clots along with other blood factors.  Electrolytes ar  e Sodium, Potassium, Calcium, Magnesium, Phosphorus.  Liver tests are: AST, ALT, Bilirubin, Alkaline Phosphatase.  Kidney tests are Creatinine, GFR.  HDL Cholesterol - is the good cholesterol and it is good to have it high.  LDL cholesterol is the bad ch  olesterol and it is good to have it low.  It is recommended to have LDL less than 130 for people with hypertension and to have it less than 100 for people with heart disease, diabetes and chronic kidney disease.  Triglycerides are another type of lipid a  nd can also cause heart disease so should be kept low.    ###These lipids or cholesterols are too high  We need to discuss them ###    Thyroid tests are TSH, T4, T3  Glucose is sugar  A1c is a test that gives us an idea about how well was controlled the diabetes for the last 3 months.   ###your diabetes is out of control###we need to discuss this and where we want to go from here ###    PSA stands for Prostate Specific Antigen and   it can be elevated with prostate cancer or prostate inflammation.

## 2019-02-12 ENCOUNTER — OFFICE VISIT (OUTPATIENT)
Dept: FAMILY MEDICINE | Facility: CLINIC | Age: 59
End: 2019-02-12
Payer: COMMERCIAL

## 2019-02-12 VITALS
WEIGHT: 255 LBS | TEMPERATURE: 98.3 F | OXYGEN SATURATION: 97 % | DIASTOLIC BLOOD PRESSURE: 80 MMHG | HEART RATE: 93 BPM | RESPIRATION RATE: 18 BRPM | BODY MASS INDEX: 40.02 KG/M2 | SYSTOLIC BLOOD PRESSURE: 130 MMHG | HEIGHT: 67 IN

## 2019-02-12 DIAGNOSIS — E11.9 TYPE 2 DIABETES MELLITUS WITHOUT COMPLICATION, WITHOUT LONG-TERM CURRENT USE OF INSULIN (H): ICD-10-CM

## 2019-02-12 DIAGNOSIS — E66.812 CLASS 2 SEVERE OBESITY DUE TO EXCESS CALORIES WITH SERIOUS COMORBIDITY AND BODY MASS INDEX (BMI) OF 38.0 TO 38.9 IN ADULT (H): ICD-10-CM

## 2019-02-12 DIAGNOSIS — E78.2 MIXED HYPERLIPIDEMIA: ICD-10-CM

## 2019-02-12 DIAGNOSIS — R91.8 ABNORMAL CT SCAN OF LUNG: ICD-10-CM

## 2019-02-12 DIAGNOSIS — E66.01 CLASS 2 SEVERE OBESITY DUE TO EXCESS CALORIES WITH SERIOUS COMORBIDITY AND BODY MASS INDEX (BMI) OF 38.0 TO 38.9 IN ADULT (H): ICD-10-CM

## 2019-02-12 DIAGNOSIS — Z13.89 SCREENING FOR DIABETIC PERIPHERAL NEUROPATHY: ICD-10-CM

## 2019-02-12 DIAGNOSIS — R09.02 HYPOXIA: ICD-10-CM

## 2019-02-12 DIAGNOSIS — J98.6 DIAPHRAGM PARALYSIS: ICD-10-CM

## 2019-02-12 DIAGNOSIS — Z86.0100 HISTORY OF COLONIC POLYPS: ICD-10-CM

## 2019-02-12 DIAGNOSIS — J40 BRONCHITIS: Primary | ICD-10-CM

## 2019-02-12 DIAGNOSIS — Z78.9 NONSMOKER: ICD-10-CM

## 2019-02-12 DIAGNOSIS — K76.0 FATTY LIVER: ICD-10-CM

## 2019-02-12 PROCEDURE — 99207 C FOOT EXAM  NO CHARGE: CPT | Mod: 25 | Performed by: FAMILY MEDICINE

## 2019-02-12 PROCEDURE — 99214 OFFICE O/P EST MOD 30 MIN: CPT | Performed by: FAMILY MEDICINE

## 2019-02-12 PROCEDURE — 82043 UR ALBUMIN QUANTITATIVE: CPT | Performed by: FAMILY MEDICINE

## 2019-02-12 RX ORDER — SIMVASTATIN 20 MG
20 TABLET ORAL AT BEDTIME
Qty: 90 TABLET | Refills: 0 | Status: SHIPPED | OUTPATIENT
Start: 2019-02-12 | End: 2019-05-15

## 2019-02-12 ASSESSMENT — MIFFLIN-ST. JEOR: SCORE: 1940.3

## 2019-02-12 NOTE — PATIENT INSTRUCTIONS
1.  Weight Loss Tips  1.#### Do not eat after 6 hrs before your expected bedtime  2. Have your heaviest meal for breakfast, a slightly lighter meal at lunch and a snack 6 hrs before bed  3. No sugar/calorie drinks except milk ie no fruit juice, pop, alcohol.  4. Drink milk 30min before meals to decrease your hunger. Also it is excellent as part of your last meal of the day snack  5. Drink lots of water  6. Increase fiber in diet: all bran cereal, salads, popcorn etc  7. Have only one small serving of fruit a day about 1/2 cup (as this is high in sugar)  8. EXERCISE is the bottom line. Without it, you will gain weight even on a low calorie diet. Best if done 2-3X a day as can    Being overweight contributes to high blood pressure and high cholesterol, both of which cause heart attacks, strokes and kidney failure, prediabetes and diabetes, arthritis, and liver disease     2. See an eye DOCTOR --it is important with diabetes     3. Shingrex is a 2 shot series that prevents shingles 97% of the time, as opposed to the old shingles shot that only prevented it at 40-50%  It costs less for medicare at a pharmacy  You should get it starting at 50 yrs old get the 2nd shot 5-6 mo after the first one    4. The only way known to prevent diabetes or keep it from getting worse is exercise, 20-40 minutes 3 times a day around the time of meals as your insulin is wearing out  You need to get rid of the sugar using your muscles     5. chek with insurance to see if they cover diabetic education     6. No difference was  Noted by patients in a double blind study when given codeine, tylenol ( acetaminophen) or ibuprofen (all in identical pills). They felt no difference in pain relief. Since ibuprofen and the NSAIDs  causes kidney damage, esophageal damage with heartburn, and can increase the risk of esophageal and stomach cancer and ulcers,and colonic strictures. They also cause increased risk of heart attack .   I recommend that you use  tylenol(acetaminophen) for pain. Use the acetaminophen ES  Which has 500mgm/tablet You can take up to 2 tablets 4 times a day as need for pain.  If this is not enough, you can add in ibuprofen or aleve(naprosyn) with 2 glasses of fluid and some food-to protect the stomach and esophagus. Please let us know if you are continuing to take ibuprofen or aleve, as we will need to periodically check your kidney function with a blood test.    7. Do the labs in 6-8 weeks , then see me in 2 days   The prescription(s) have been sent to your pharmacy electronically and the future lab has been ordered. Please call us at [phone] to make a lab appointment.

## 2019-02-12 NOTE — PROGRESS NOTES
"  SUBJECTIVE:   Donis Barraza is a 58 year old male who presents to clinic today for the following health issues:    Diabetes -DX on 2-2-19  with A1C= 8.2      Patient is checking blood sugars: not at all    Random glu = 255 on 1-3-19    Prior  FBSsaround 90     Diabetic concerns: None     Symptoms of hypoglycemia (low blood sugar): none     Paresthesias (numbness or burning in feet) or sores: No     Date of last diabetic eye exam: ?    BP Readings from Last 2 Encounters:   02/02/19 110/80   01/21/19 110/80     Hemoglobin A1C (%)   Date Value   02/02/2019 8.2 (H)     LDL Cholesterol Calculated (mg/dL)   Date Value   05/24/2013 157 (H)   02/11/2008 164 (H)   Diabetes Management Resources    Mixed Hyperlipidemia Follow-Up      Rate your low fat/cholesterol diet?: not monitoring fat    Taking statin?  No-started today     Other lipid medications/supplements?:  None    psat [id number]=614 in 2-19    NONMORBID OBESITY      -morbid with comorbid DM, hi LDL     -sedentary     -BMI=39    -Fatty Liver / CT 5-13 --with wnl LFTs     ENT Symptoms             Symptoms: cc Present Absent Comment   Fever/Chills   x    Fatigue   x    Muscle Aches   x    Eye Irritation   x    Sneezing   x    Nasal Bautista/Drg  x     Sinus Pressure/Pain  x     Loss of smell   x    Dental pain   x    Sore Throat   x    Swollen Glands   x    Ear Pain/Fullness   x    Cough  x     Wheeze   x    Chest Pain   x    Shortness of breath  x     Rash   x    Other   x      Symptom duration:  11/2018   Symptom severity:  Severe   Treatments tried:  Mucinex, Advil, Nebulizer, Robitussin, Cefnidir and Prednisone start 10mgm il1-24-22--no improvement   Prednisone-->irritable and hyper    Contacts:  Son-hospital till 2-1-19--30 y/o pericarditis   PFTs ? 2011? Showed \"65% lung volume\" per pt   Denies recurent bronchitis but was bad enough to be tested then   1-19 CXR = neg except for the diaphragm   ABNORMAL CT  With dye 5-13 : lung nodule- lingula - recommended 6 mo " "f/u CT   Nonsmoker     ELEVATED RT HEMIDIAPHRAGM  -with atelectasis   -post fall in 2006    HYPOXIA   -95-97% with OCCAS 99% since 2013 ( 1st record )     MORBID OBESITY    -BMI = 39   -COMORBID DM , fatty liver, r/o COPD   --sedentary life style incl work    FATTY LIVER     -per 5-13 CT   -is obese   -normal LFTs     COLON POLYP-    - 2-08 per colonoscopy -  -was to redo it in 5 yrs       Amount of exercise or physical activity: None    Problems taking medications regularly: No    Medication side effects: none    Diet: regular (no restrictions)        Problem list and histories reviewed & adjusted, as indicated.  Additional history: as documented    Labs reviewed in EPIC    Reviewed and updated as needed this visit by clinical staff  Tobacco  Allergies  Meds  Problems  Med Hx  Surg Hx  Fam Hx  Soc Hx        Reviewed and updated as needed this visit by Provider  Tobacco  Allergies  Meds  Problems  Med Hx  Surg Hx  Fam Hx         ROS:  CONSTITUTIONAL: NEGATIVE for fever, chills, change in weight  INTEGUMENTARY/SKIN: NEGATIVE for worrisome rashes, moles or lesions  EYES: NEGATIVE for vision changes or irritation  ENT/MOUTH: NEGATIVE for ear, mouth and throat problems  RESP:POSITIVE for dyspnea on exertion and cough-non productive  BREAST: NEGATIVE for masses, tenderness or discharge  CV: NEGATIVE for chest pain, palpitations or peripheral edema  GI: NEGATIVE for nausea, abdominal pain, heartburn, or change in bowel habits  : NEGATIVE for frequency, dysuria, or hematuria  MUSCULOSKELETAL: NEGATIVE for significant arthralgias or myalgia  NEURO: NEGATIVE for weakness, dizziness or paresthesias  ENDOCRINE: NEGATIVE for temperature intolerance, skin/hair changes  HEME: NEGATIVE for bleeding problems  PSYCHIATRIC: NEGATIVE for changes in mood or affect    OBJECTIVE:     /80   Pulse 93   Temp 98.3  F (36.8  C) (Tympanic)   Resp 18   Ht 1.702 m (5' 7\")   Wt 115.7 kg (255 lb)   SpO2 97%   BMI 39.94 " kg/m    Body mass index is 39.94 kg/m .  GENERAL: healthy, alert, no distress, obese and fatigued  EYES: Eyes grossly normal to inspection, PERRL and conjunctivae and sclerae normal  RESP: lungs clear to auscultation - no rales, rhonchi or wheezes  CV: regular rate and rhythm, normal S1 S2, no S3 or S4, no murmur, click or rub, no peripheral edema and peripheral pulses strong  MS: no gross musculoskeletal defects noted, no edema  SKIN: no suspicious lesions or rashes  Diabetic Foot Exam: No sores, ulcers, erthematous pressure areas; good DP& PT pulses and normal capillary filling time ;normal sensation to monofilament  testing   NEURO: Normal strength and tone, mentation intact and speech normal  PSYCH: mentation appears normal, affect normal/bright  LYMPH: Negative CCOA nodes and thyroid and inguinal nodes       Diagnostic Test Results:  Results for orders placed or performed in visit on 02/02/19   Hepatitis C Screen Reflex to HCV RNA Quant and Genotype   Result Value Ref Range    Hepatitis C Antibody Nonreactive NR^Nonreactive   Lipid panel reflex to direct LDL Fasting   Result Value Ref Range    Cholesterol 195 <200 mg/dL    Triglycerides 211 (H) <150 mg/dL    HDL Cholesterol 36 (L) >39 mg/dL    LDL Cholesterol Calculated 117 (H) <100 mg/dL    Non HDL Cholesterol 159 (H) <130 mg/dL   Basic metabolic panel   Result Value Ref Range    Sodium 137 133 - 144 mmol/L    Potassium 4.5 3.4 - 5.3 mmol/L    Chloride 103 94 - 109 mmol/L    Carbon Dioxide 29 20 - 32 mmol/L    Anion Gap 5 3 - 14 mmol/L    Glucose 144 (H) 70 - 99 mg/dL    Urea Nitrogen 18 7 - 30 mg/dL    Creatinine 0.92 0.66 - 1.25 mg/dL    GFR Estimate >90 >60 mL/min/[1.73_m2]    GFR Estimate If Black >90 >60 mL/min/[1.73_m2]    Calcium 9.0 8.5 - 10.1 mg/dL   CBC with platelets differential   Result Value Ref Range    WBC 9.0 4.0 - 11.0 10e9/L    RBC Count 5.10 4.4 - 5.9 10e12/L    Hemoglobin 15.0 13.3 - 17.7 g/dL    Hematocrit 43.9 40.0 - 53.0 %    MCV 86 78  - 100 fl    MCH 29.4 26.5 - 33.0 pg    MCHC 34.2 31.5 - 36.5 g/dL    RDW 12.5 10.0 - 15.0 %    Platelet Count 234 150 - 450 10e9/L    % Neutrophils 62.9 %    % Lymphocytes 28.3 %    % Monocytes 7.1 %    % Eosinophils 1.3 %    % Basophils 0.4 %    Absolute Neutrophil 5.7 1.6 - 8.3 10e9/L    Absolute Lymphocytes 2.5 0.8 - 5.3 10e9/L    Absolute Monocytes 0.6 0.0 - 1.3 10e9/L    Absolute Eosinophils 0.1 0.0 - 0.7 10e9/L    Absolute Basophils 0.0 0.0 - 0.2 10e9/L    Diff Method Automated Method    ALT   Result Value Ref Range    ALT 41 0 - 70 U/L   Hemoglobin A1c   Result Value Ref Range    Hemoglobin A1C 8.2 (H) 0 - 5.6 %   Prostate spec antigen screen   Result Value Ref Range    PSA 0.43 0 - 4 ug/L       ASSESSMENT/PLAN:               ICD-10-CM    1. Bronchitis subacute onset 11-18 J40    2. 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 Hypoxia since 2013 R09.02    4. Nonsmoker Z78.9    5. Abnormal CT scan of lung 5-13 lingular nodule  R91.8    6. Type 2 diabetes mellitus without complication, without long-term current use of insulin (H)-new Dx  w hyperglycemia  E11.9 Albumin Random Urine Quantitative with Creat Ratio     metFORMIN (GLUCOPHAGE) 500 MG tablet     Hemoglobin A1c     TSH with free T4 reflex     CANCELED: TSH WITH FREE T4 REFLEX   7. Mixed hyperlipidemia E78.2 simvastatin (ZOCOR) 20 MG tablet     Lipid panel reflex to direct LDL Fasting     ALT   8. History of colonic polyps one per 2-08 w fam hx same Z86.010 GASTROENTEROLOGY ADULT REF PROCEDURE ONLY Other; [facility] [phone]   9. Fatty liver per 5-13 CT  K76.0    10. Class 2 severe obesity due to excess calories with serious comorbidity and body mass index (BMI) of 38.0 to 38.9 in adult (H) E66.01 ALT    Z68.38    11. Screening for diabetic peripheral neuropathy Z13.89 FOOT EXAM  NO CHARGE [57780.237]       Patient Instructions   1.  Weight Loss Tips  1.#### Do not eat after 6 hrs before your expected bedtime  2. Have your heaviest meal for  breakfast, a slightly lighter meal at lunch and a snack 6 hrs before bed  3. No sugar/calorie drinks except milk ie no fruit juice, pop, alcohol.  4. Drink milk 30min before meals to decrease your hunger. Also it is excellent as part of your last meal of the day snack  5. Drink lots of water  6. Increase fiber in diet: all bran cereal, salads, popcorn etc  7. Have only one small serving of fruit a day about 1/2 cup (as this is high in sugar)  8. EXERCISE is the bottom line. Without it, you will gain weight even on a low calorie diet. Best if done 2-3X a day as can    Being overweight contributes to high blood pressure and high cholesterol, both of which cause heart attacks, strokes and kidney failure, prediabetes and diabetes, arthritis, and liver disease     2. See an eye DOCTOR --it is important with diabetes     3. Shingrex is a 2 shot series that prevents shingles 97% of the time, as opposed to the old shingles shot that only prevented it at 40-50%  It costs less for medicare at a pharmacy  You should get it starting at 50 yrs old get the 2nd shot 5-6 mo after the first one    4. The only way known to prevent diabetes or keep it from getting worse is exercise, 20-40 minutes 3 times a day around the time of meals as your insulin is wearing out  You need to get rid of the sugar using your muscles     5. chek with insurance to see if they cover diabetic education     6. No difference was  Noted by patients in a double blind study when given codeine, tylenol ( acetaminophen) or ibuprofen (all in identical pills). They felt no difference in pain relief. Since ibuprofen and the NSAIDs  causes kidney damage, esophageal damage with heartburn, and can increase the risk of esophageal and stomach cancer and ulcers,and colonic strictures. They also cause increased risk of heart attack .   I recommend that you use tylenol(acetaminophen) for pain. Use the acetaminophen ES  Which has 500mgm/tablet You can take up to 2 tablets  4 times a day as need for pain.  If this is not enough, you can add in ibuprofen or aleve(naprosyn) with 2 glasses of fluid and some food-to protect the stomach and esophagus. Please let us know if you are continuing to take ibuprofen or aleve, as we will need to periodically check your kidney function with a blood test.    7. Do the labs in 6-8 weeks , then see me in 2 days   The prescription(s) have been sent to your pharmacy electronically and the future lab has been ordered. Please call us at [phone] to make a lab appointment.     Pt has some other questions re joint etc pains and should rtc as he desires re these  He was 24 min late for today's appt-->  will need to cover these at a future appt                                                                                      Bere Middleton MD  [facility]    Pt started on metformin and statin today   Will need an ACE on rtc   Also needs spirometry on rtc as should be better by then   Need to order the f/u CT of lungs recommended for the lingular nodule In  5-13     His SOB and hypoxia are from his obesity but also may be developing COPD  And needs further work up   Weight management plan: Discussed healthy diet and exercise guidelines    Bere Middleton MD

## 2019-02-12 NOTE — NURSING NOTE
"Chief Complaint   Patient presents with     Recheck Medication     /80   Pulse 93   Temp 98.3  F (36.8  C) (Tympanic)   Resp 18   Ht 1.702 m (5' 7\")   Wt 115.7 kg (255 lb)   SpO2 97%   BMI 39.94 kg/m   Estimated body mass index is 39.94 kg/m  as calculated from the following:    Height as of this encounter: 1.702 m (5' 7\").    Weight as of this encounter: 115.7 kg (255 lb).  BP completed using cuff size: larry Carcamo CMA    Health Maintenance Due   Topic Date Due     FOOT EXAM Q1 YEAR  03/22/1961     EYE EXAM Q1 YEAR  03/22/1961     MICROALBUMIN Q1 YEAR  [birth year]/22/1961     HIV SCREEN (SYSTEM ASSIGNED)  03/22/1979     ZOSTER IMMUNIZATION (1 of 2) 03/22/2011     TSH W/ FREE T4 REFLEX Q2 YEAR  05/20/2015     Health Maintenance reviewed at today's visit patient asked to schedule/complete:   Diabetes:  Patient agrees to schedule  Immunizations:  Patient agrees to schedule    "

## 2019-02-12 NOTE — LETTER
February 14, 2019      Donis Barraza  [address] [facility] [id number]        Dear ,    We are writing to inform you of your test results.    Normal kidneys!     Resulted Orders   Albumin Random Urine Quantitative with Creat Ratio   Result Value Ref Range    Creatinine Urine 239 mg/dL    Albumin Urine mg/L 11 mg/L    Albumin Urine mg/g Cr 4.44 0 - 17 mg/g Cr       If you have any questions or concerns, please call the clinic at the number listed above.       Sincerely,        Bere Middleton MD

## 2019-02-13 LAB
CREAT UR-MCNC: 239 MG/DL
MICROALBUMIN UR-MCNC: 11 MG/L
MICROALBUMIN/CREAT UR: 4.44 MG/G CR (ref 0–17)

## 2019-02-19 DIAGNOSIS — J21.9 ACUTE BRONCHIOLITIS WITH BRONCHOSPASM: ICD-10-CM

## 2019-02-19 NOTE — TELEPHONE ENCOUNTER
"Requested Prescriptions   Pending Prescriptions Disp Refills     albuterol (ACCUNEB) 1.25 MG/3ML neb solution  Last Written Prescription Date:  01/21/19  Last Fill Quantity: 40 vials,  # refills: 1   Last office visit: 1/21/2019 with prescribing provider:  01/21/19   Future Office Visit:     40 vial 1     Sig: Take 1 vial (1.25 mg) by nebulization every 6 hours as needed for shortness of breath / dyspnea or wheezing    Asthma Maintenance Inhalers - Anticholinergics Passed - 2/19/2019  8:12 AM       Passed - Patient is age 12 years or older       Passed - Recent (12 mo) or future (30 days) visit within the authorizing provider's specialty    Patient had office visit in the last 12 months or has a visit in the next 30 days with authorizing provider or within the authorizing provider's specialty.  See \"Patient Info\" tab in inbasket, or \"Choose Columns\" in Meds & Orders section of the refill encounter.             Passed - Medication is active on med list          "

## 2019-02-20 RX ORDER — ALBUTEROL SULFATE 1.25 MG/3ML
1.25 SOLUTION RESPIRATORY (INHALATION) EVERY 6 HOURS PRN
Qty: 40 VIAL | Refills: 0 | Status: SHIPPED | OUTPATIENT
Start: 2019-02-20 | End: 2020-05-27

## 2019-02-20 NOTE — TELEPHONE ENCOUNTER
Prescription approved per [facility].  Liseth Thapa RN

## 2019-02-21 ENCOUNTER — TRANSFERRED RECORDS (OUTPATIENT)
Dept: HEALTH INFORMATION MANAGEMENT | Facility: CLINIC | Age: 59
End: 2019-02-21

## 2019-02-21 LAB — RETINOPATHY: NEGATIVE

## 2019-05-15 DIAGNOSIS — E78.2 MIXED HYPERLIPIDEMIA: ICD-10-CM

## 2019-05-15 DIAGNOSIS — E11.9 TYPE 2 DIABETES MELLITUS WITHOUT COMPLICATION, WITHOUT LONG-TERM CURRENT USE OF INSULIN (H): ICD-10-CM

## 2019-05-16 RX ORDER — SIMVASTATIN 20 MG
TABLET ORAL
Qty: 30 TABLET | Refills: 0 | Status: SHIPPED | OUTPATIENT
Start: 2019-05-16 | End: 2020-05-27

## 2019-05-16 NOTE — TELEPHONE ENCOUNTER
"Requested Prescriptions   Pending Prescriptions Disp Refills     metFORMIN (GLUCOPHAGE) 500 MG tablet [Pharmacy Med Name: METFORMIN 500MG TABLETS]  Last Written Prescription Date:  2/12/2019  Last Fill Quantity: 180 tablet,  # refills: 0   Last office visit: 2/12/2019 with prescribing provider:  MAKAYLA Middleton   Future Office Visit:     180 tablet 0     Sig: TAKE 1 TABLET BY MOUTH TWICE DAILY WITH MEALS       Biguanide Agents Failed - 5/15/2019  9:11 PM        Failed - Patient has documented A1c within the specified period of time.     If HgbA1C is 8 or greater, it needs to be on file within the past 3 months.  If less than 8, must be on file within the past 6 months.     Recent Labs   Lab Test 02/02/19  1123   A1C 8.2*             Passed - Blood pressure less than 140/90 in past 6 months     BP Readings from Last 3 Encounters:   02/12/19 130/80   02/02/19 110/80   01/21/19 110/80                 Passed - Patient has documented LDL within the past 12 mos.     Recent Labs   Lab Test 02/02/19  1123   *             Passed - Patient has had a Microalbumin in the past 15 mos.     Recent Labs   Lab Test 02/12/19  0955   MICROL 11   UMALCR 4.44             Passed - Patient is age 10 or older        Passed - Patient's CR is NOT>1.4 OR Patient's EGFR is NOT<45 within past 12 mos.     Recent Labs   Lab Test 02/02/19  1123   GFRESTIMATED >90   GFRESTBLACK >90       Recent Labs   Lab Test 02/02/19  1123   CR 0.92             Passed - Patient does NOT have a diagnosis of CHF.        Passed - Medication is active on med list        Passed - Recent (6 mo) or future (30 days) visit within the authorizing provider's specialty     Patient had office visit in the last 6 months or has a visit in the next 30 days with authorizing provider or within the authorizing provider's specialty.  See \"Patient Info\" tab in inbasket, or \"Choose Columns\" in Meds & Orders section of the refill encounter.            simvastatin (ZOCOR) 20 MG " "tablet [Pharmacy Med Name: SIMVASTATIN 20MG TABLETS]  Last Written Prescription Date:  2/12/2019  Last Fill Quantity: 90 tablet,  # refills: 0   Last office visit: 2/12/2019 with prescribing provider:  LUKE Middleton   Future Office Visit:     90 tablet 0     Sig: TAKE 1 TABLET BY MOUTH EVERY NIGHT AT BEDTIME       Statins Protocol Passed - 5/15/2019  9:11 PM        Passed - LDL on file in past 12 months     Recent Labs   Lab Test 02/02/19  1123   *             Passed - No abnormal creatine kinase in past 12 months     No lab results found.             Passed - Recent (12 mo) or future (30 days) visit within the authorizing provider's specialty     Patient had office visit in the last 12 months or has a visit in the next 30 days with authorizing provider or within the authorizing provider's specialty.  See \"Patient Info\" tab in inbasket, or \"Choose Columns\" in Meds & Orders section of the refill encounter.              Passed - Medication is active on med list        Passed - Patient is age 18 or older           "

## 2019-06-17 DIAGNOSIS — E11.9 TYPE 2 DIABETES MELLITUS WITHOUT COMPLICATION, WITHOUT LONG-TERM CURRENT USE OF INSULIN (H): ICD-10-CM

## 2019-06-17 DIAGNOSIS — E78.2 MIXED HYPERLIPIDEMIA: ICD-10-CM

## 2019-06-17 NOTE — TELEPHONE ENCOUNTER
"Requested Prescriptions   Pending Prescriptions Disp Refills     simvastatin (ZOCOR) 20 MG tablet [Pharmacy Med Name: SIMVASTATIN 20MG TABLETS]  Last Written Prescription Date:  5/16/2019  Last Fill Quantity: 30 tablet,  # refills: 0   Last office visit: 2/12/2019 with prescribing provider:  MAKAYLA Middleton   Future Office Visit:     30 tablet 0     Sig: TAKE 1 TABLET BY MOUTH EVERY NIGHT AT BEDTIME       Statins Protocol Passed - 6/17/2019 10:24 AM        Passed - LDL on file in past 12 months     Recent Labs   Lab Test 02/02/19  1123   *             Passed - No abnormal creatine kinase in past 12 months     No lab results found.             Passed - Recent (12 mo) or future (30 days) visit within the authorizing provider's specialty     Patient had office visit in the last 12 months or has a visit in the next 30 days with authorizing provider or within the authorizing provider's specialty.  See \"Patient Info\" tab in inbasket, or \"Choose Columns\" in Meds & Orders section of the refill encounter.              Passed - Medication is active on med list        Passed - Patient is age 18 or older        metFORMIN (GLUCOPHAGE) 500 MG tablet [Pharmacy Med Name: METFORMIN 500MG TABLETS]  Last Written Prescription Date:  5/16/2019  Last Fill Quantity: 60 tablet,  # refills: 0   Last office visit: 2/12/2019 with prescribing provider:  MAKAYLA Middleton   Future Office Visit:     60 tablet 0     Sig: TAKE 1 TABLET BY MOUTH TWICE DAILY WITH MEALS       Biguanide Agents Failed - 6/17/2019 10:24 AM        Failed - Patient has documented A1c within the specified period of time.     If HgbA1C is 8 or greater, it needs to be on file within the past 3 months.  If less than 8, must be on file within the past 6 months.     Recent Labs   Lab Test 02/02/19  1123   A1C 8.2*             Passed - Blood pressure less than 140/90 in past 6 months     BP Readings from Last 3 Encounters:   02/12/19 130/80   02/02/19 110/80   01/21/19 110/80 " "                Passed - Patient has documented LDL within the past 12 mos.     Recent Labs   Lab Test 02/02/19  1123   *             Passed - Patient has had a Microalbumin in the past 15 mos.     Recent Labs   Lab Test 02/12/19  0955   MICROL 11   UMALCR 4.44             Passed - Patient is age 10 or older        Passed - Patient's CR is NOT>1.4 OR Patient's EGFR is NOT<45 within past 12 mos.     Recent Labs   Lab Test 02/02/19  1123   GFRESTIMATED >90   GFRESTBLACK >90       Recent Labs   Lab Test 02/02/19  1123   CR 0.92             Passed - Patient does NOT have a diagnosis of CHF.        Passed - Medication is active on med list        Passed - Recent (6 mo) or future (30 days) visit within the authorizing provider's specialty     Patient had office visit in the last 6 months or has a visit in the next 30 days with authorizing provider or within the authorizing provider's specialty.  See \"Patient Info\" tab in inbasket, or \"Choose Columns\" in Meds & Orders section of the refill encounter.               "

## 2019-06-18 RX ORDER — SIMVASTATIN 20 MG
TABLET ORAL
Qty: 30 TABLET | Refills: 0 | OUTPATIENT
Start: 2019-06-18

## 2019-06-18 NOTE — TELEPHONE ENCOUNTER
Patient states that he is out of simvastatin and is feeling better off of it. He said he hurts all over while taking this.     He is calling back to schedule a lab only appointment to check A1c and fasting LDL.     Hold off on filling simvastatin. Routing request for metformin to provider to approve.

## 2019-12-24 ENCOUNTER — TELEPHONE (OUTPATIENT)
Dept: FAMILY MEDICINE | Facility: CLINIC | Age: 59
End: 2019-12-24

## 2019-12-24 NOTE — LETTER
December 24, 2019    Donis Barraza  [address] [facility] [address]    Dear Rashida Dykes cares about your health and your health plan.  I have reviewed your medical conditions, medication list and lab results, and am making recommendations based on this review to better manage your health.    You are in particular need of attention regarding:  -Colon Cancer Screening  -Wellness (Physical) Visit     I am recommending that you:     -schedule a WELLNESS (Physical) APPOINTMENT with me.       -schedule a COLONOSCOPY to look for colon cancer (due every 10 years or 5 years in higher risk situations.)        Colon cancer is now the second leading cause of cancer-related deaths in the [facility] for both men and women and there are over 130,000 new cases and 50,000 deaths per year from colon cancer.  Colonoscopies can prevent 90-95% of these deaths.  Problem lesions can be removed before they ever become cancer.  This test is not only looking for cancer, but also getting rid of precancerious lesions.    If you are under/uninsured, we recommend you contact the [employer] program. [employer] is a free colorectal cancer screening program that provides colonoscopies for eligible under/uninsured [place] men and women. If you are interested in receiving a free colonoscopy, please call [employer] at [phone] (mention code [employer]) to see if you re eligible.      If you do not wish to do a colonoscopy or cannot afford to do one, at this time, there is another option. It is called a FIT test or Fecal Immunochemical Occult Blood Test (take home stool sample kit).  It does not replace the colonoscopy for colorectal cancer screening, but it can detect hidden bleeding in the lower colon.  It does need to be repeated every year and if a positive result is obtained, you would be referred for a colonoscopy.          If you have completed either one of these tests at another facility, please call with the  details of when and where the tests were done and if they were normal or not. Or have the records sent to our clinic so that we can best coordinate your care.      Please call us at the [place] location:  [phone] or use [employer] to address the above recommendations.     Thank you for trusting [facility].  We appreciate the opportunity to serve you and look forward to supporting your healthcare in the future.    If you have (or plan to have) any of these tests done at a facility other than a [facility] or a [facility], please have the results sent to the [facility] location noted above.      Best Regards,    Bere Middleton MD

## 2019-12-24 NOTE — TELEPHONE ENCOUNTER
Panel Management Review      Patient has the following on his problem list: None      Composite cancer screening  Chart review shows that this patient is due/due soon for the following Colonoscopy  Summary:    Patient is due/failing the following:   COLONOSCOPY    Action needed:       Type of outreach:    Sent letter.    Questions for provider review:    None                                                                                                                                    Nathaly Carcamo CMA       Chart routed to NA .

## 2020-01-09 ENCOUNTER — OFFICE VISIT (OUTPATIENT)
Dept: FAMILY MEDICINE | Facility: CLINIC | Age: 60
End: 2020-01-09
Payer: COMMERCIAL

## 2020-01-09 ENCOUNTER — HOSPITAL ENCOUNTER (OUTPATIENT)
Facility: CLINIC | Age: 60
End: 2020-01-09
Attending: INTERNAL MEDICINE | Admitting: INTERNAL MEDICINE
Payer: COMMERCIAL

## 2020-01-09 ENCOUNTER — ANCILLARY PROCEDURE (OUTPATIENT)
Dept: GENERAL RADIOLOGY | Facility: CLINIC | Age: 60
End: 2020-01-09
Attending: FAMILY MEDICINE
Payer: COMMERCIAL

## 2020-01-09 VITALS
TEMPERATURE: 98 F | HEIGHT: 67 IN | HEART RATE: 83 BPM | DIASTOLIC BLOOD PRESSURE: 82 MMHG | RESPIRATION RATE: 18 BRPM | OXYGEN SATURATION: 96 % | SYSTOLIC BLOOD PRESSURE: 128 MMHG | BODY MASS INDEX: 38.3 KG/M2 | WEIGHT: 244 LBS

## 2020-01-09 DIAGNOSIS — K76.0 FATTY LIVER: ICD-10-CM

## 2020-01-09 DIAGNOSIS — E66.01 CLASS 2 SEVERE OBESITY DUE TO EXCESS CALORIES WITH SERIOUS COMORBIDITY AND BODY MASS INDEX (BMI) OF 38.0 TO 38.9 IN ADULT (H): ICD-10-CM

## 2020-01-09 DIAGNOSIS — Z01.818 PREOP GENERAL PHYSICAL EXAM: ICD-10-CM

## 2020-01-09 DIAGNOSIS — Z98.890 HX OF REPAIR OF LEFT ROTATOR CUFF: ICD-10-CM

## 2020-01-09 DIAGNOSIS — Z86.0100 HISTORY OF COLONIC POLYPS: ICD-10-CM

## 2020-01-09 DIAGNOSIS — E11.9 TYPE 2 DIABETES MELLITUS WITHOUT COMPLICATION, WITHOUT LONG-TERM CURRENT USE OF INSULIN (H): ICD-10-CM

## 2020-01-09 DIAGNOSIS — R09.02 HYPOXIA: ICD-10-CM

## 2020-01-09 DIAGNOSIS — R91.8 ABNORMAL CT SCAN OF LUNG: ICD-10-CM

## 2020-01-09 DIAGNOSIS — S46.011D TRAUMATIC INCOMPLETE TEAR OF RIGHT ROTATOR CUFF, SUBSEQUENT ENCOUNTER: ICD-10-CM

## 2020-01-09 DIAGNOSIS — E78.2 MIXED HYPERLIPIDEMIA: ICD-10-CM

## 2020-01-09 DIAGNOSIS — F40.240 CLAUSTROPHOBIA: ICD-10-CM

## 2020-01-09 DIAGNOSIS — J41.8 MIXED SIMPLE AND MUCOPURULENT CHRONIC BRONCHITIS (H): ICD-10-CM

## 2020-01-09 DIAGNOSIS — Z01.818 PREOP GENERAL PHYSICAL EXAM: Primary | ICD-10-CM

## 2020-01-09 DIAGNOSIS — E66.812 CLASS 2 SEVERE OBESITY DUE TO EXCESS CALORIES WITH SERIOUS COMORBIDITY AND BODY MASS INDEX (BMI) OF 38.0 TO 38.9 IN ADULT (H): ICD-10-CM

## 2020-01-09 DIAGNOSIS — Z13.89 SCREENING FOR DIABETIC PERIPHERAL NEUROPATHY: ICD-10-CM

## 2020-01-09 LAB
BASOPHILS # BLD AUTO: 0.1 10E9/L (ref 0–0.2)
BASOPHILS NFR BLD AUTO: 0.8 %
DIFFERENTIAL METHOD BLD: NORMAL
EOSINOPHIL # BLD AUTO: 0.1 10E9/L (ref 0–0.7)
EOSINOPHIL NFR BLD AUTO: 1.4 %
ERYTHROCYTE [DISTWIDTH] IN BLOOD BY AUTOMATED COUNT: 12.4 % (ref 10–15)
FEF 25/75: 0.16
FEV-1: 0.17
FEV1/FVC: 79
FVC: 0.22
HBA1C MFR BLD: 7.6 % (ref 0–5.6)
HCT VFR BLD AUTO: 47.2 % (ref 40–53)
HGB BLD-MCNC: 15.9 G/DL (ref 13.3–17.7)
LYMPHOCYTES # BLD AUTO: 1.9 10E9/L (ref 0.8–5.3)
LYMPHOCYTES NFR BLD AUTO: 29.3 %
MCH RBC QN AUTO: 29.3 PG (ref 26.5–33)
MCHC RBC AUTO-ENTMCNC: 33.7 G/DL (ref 31.5–36.5)
MCV RBC AUTO: 87 FL (ref 78–100)
MONOCYTES # BLD AUTO: 0.6 10E9/L (ref 0–1.3)
MONOCYTES NFR BLD AUTO: 8.5 %
NEUTROPHILS # BLD AUTO: 4 10E9/L (ref 1.6–8.3)
NEUTROPHILS NFR BLD AUTO: 60 %
PLATELET # BLD AUTO: 217 10E9/L (ref 150–450)
RBC # BLD AUTO: 5.42 10E12/L (ref 4.4–5.9)
WBC # BLD AUTO: 6.6 10E9/L (ref 4–11)

## 2020-01-09 PROCEDURE — 99207 C FOOT EXAM  NO CHARGE: CPT | Performed by: FAMILY MEDICINE

## 2020-01-09 PROCEDURE — 36415 COLL VENOUS BLD VENIPUNCTURE: CPT | Performed by: FAMILY MEDICINE

## 2020-01-09 PROCEDURE — 85025 COMPLETE CBC W/AUTO DIFF WBC: CPT | Performed by: FAMILY MEDICINE

## 2020-01-09 PROCEDURE — 83036 HEMOGLOBIN GLYCOSYLATED A1C: CPT | Performed by: FAMILY MEDICINE

## 2020-01-09 PROCEDURE — 84443 ASSAY THYROID STIM HORMONE: CPT | Performed by: FAMILY MEDICINE

## 2020-01-09 PROCEDURE — 80048 BASIC METABOLIC PNL TOTAL CA: CPT | Performed by: FAMILY MEDICINE

## 2020-01-09 PROCEDURE — 93000 ELECTROCARDIOGRAM COMPLETE: CPT | Performed by: FAMILY MEDICINE

## 2020-01-09 PROCEDURE — 71046 X-RAY EXAM CHEST 2 VIEWS: CPT | Mod: FY

## 2020-01-09 PROCEDURE — 82043 UR ALBUMIN QUANTITATIVE: CPT | Performed by: FAMILY MEDICINE

## 2020-01-09 PROCEDURE — 84460 ALANINE AMINO (ALT) (SGPT): CPT | Performed by: FAMILY MEDICINE

## 2020-01-09 PROCEDURE — 94010 BREATHING CAPACITY TEST: CPT | Performed by: FAMILY MEDICINE

## 2020-01-09 PROCEDURE — 99215 OFFICE O/P EST HI 40 MIN: CPT | Mod: 25 | Performed by: FAMILY MEDICINE

## 2020-01-09 ASSESSMENT — MIFFLIN-ST. JEOR: SCORE: 1885.41

## 2020-01-09 NOTE — PROGRESS NOTES
[facility]  [address]  [facility] [address]  [phone]  Dept: [phone]    PRE-OP EVALUATION:  Today's date: 2020    Donis Barraza (: 1960) presents for pre-operative evaluation assessment as requested by Dr. Giovani Wood.  He requires evaluation and anesthesia risk assessment prior to undergoing surgery/procedure for treatment of right rotator cuff .    Proposed Surgery/ Procedure: right rotator cuff  Date of Surgery/ Procedure: 2020  Time of Surgery/ Procedure: unknown  Hospital/Surgical Facility: [facility]  Fax number for surgical facility: [phone]  Primary Physician: Ethan Casas  Type of Anesthesia Anticipated: General    Patient has a Health Care Directive or Living Will:  YES     1. NO - Do you have a history of heart attack, stroke, stent, bypass or surgery on an artery in the head, neck, heart or legs?  2. NO - Do you ever have any pain or discomfort in your chest?  3. NO - Do you have a history of  Heart Failure?  4. NO - Are you troubled by shortness of breath when: walking on the level, up a slight hill or at night?  5. NO - Do you currently have a cold, bronchitis or other respiratory infection?  6. NO - Do you have a cough, shortness of breath or wheezing?  7. NO - Do you sometimes get pains in the calves of your legs when you walk?  8. NO - Do you or anyone in your family have previous history of blood clots?  9. NO - Do you or does anyone in your family have a serious bleeding problem such as prolonged bleeding following surgeries or cuts?  10. NO - Have you ever had problems with anemia or been told to take iron pills?  11. NO - Have you had any abnormal blood loss such as black, tarry or bloody stools, or abnormal vaginal bleeding?  12. NO - Have you ever had a blood transfusion?  13. NO - Have you or any of your relatives ever had problems with anesthesia?  14. NO - Do you  have sleep apnea, excessive snoring or daytime drowsiness?  15. NO - Do you have any prosthetic heart valves?  16. NO - Do you have prosthetic joints?  17. NO - Is there any chance that you may be pregnant?      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 Not at all    What concerns do you have today about your diabetes? None     Do you have any of these symptoms? (Select all that apply)  No numbness or tingling in feet.  No redness, sores or blisters on feet.  No complaints of excessive thirst.  No reports of blurry vision.  No significant changes to weight.     Have you had a diabetic eye exam in the last 12 months?     Has been making a n effort at diet and exercise the last 6 mo and A1C has gone down     BP Readings from Last 2 Encounters:   01/09/20 128/82   02/12/19 130/80     Hemoglobin A1C (%)   Date Value   01/09/2020 7.6 (H)   02/02/2019 8.2 (H)     LDL Cholesterol Calculated (mg/dL)   Date Value   02/02/2019 117 (H)   05/24/2013 157 (H)       Diabetes Management Resources    Mixed Hyperlipidemia Follow-Up      Are you regularly taking any medication or supplement to lower your cholesterol?   Yes- zocor 20mgm     Are you having muscle aches or other side  effects that you think could be caused by your cholesterol lowering medication?  No       COPD  With Hypoxia since 2013   Abnormal CT : nodules in 5-13    Chronic Elevated Rt Hemidiaphragm       Overall, how are your COPD symptoms since your last clinic visit?  No change-denies symptoms     How much fatigue or shortness of breath do you have when you are walking?  Same as usual    How much shortness of breath do you have when you are resting?  None    How often do you cough? Never    Have you noticed any change in your sputum/phlegm?  No    Have you experienced a recent fever? No    Please describe how far you can walk without stopping to rest:  1-2 miles    How many flights of stairs are you able to walk up without stopping?  1    Have you had any Emergency Room Visits, Urgent Care Visits, or Hospital Admissions because of your COPD since your last office visit?  No    History   Smoking Status     Never Smoker   Smokeless Tobacco     Former User     Comment: passive user   did use vape  Lab Results   Component Value Date    FEV1=1% 0.17 01/09/2020    BNE3TKA=5% 79 01/09/2020   Severe airway obstruction with low VC with probable restricted lung volumes   Pt refuses f/u CT as is CLAUSTROPHOBIC-even with premed with xanax     COLON POLYPS    - neg fam hx col ca   - polyps per colonoscopy 2008  -was to f/u in 2013 but did not   No BRB or symptoms         COPD - Patient has a longstanding history of moderate-severe COPD . Patient has been doing well overall noting NO SYMPTOMS and continues on medication regimen consisting of 0 without adverse reactions or side effects.    DIABETES - Patient has a longstanding history of DiabetesType Type II . Patient is being treated with diet and oral agents and denies significant side effects. Control has been fair. Complicating factors include but are not limited to: hypertension, hyperlipidemia and morbid obesity . Improving with 6 mo of diet and exercise program     HYPERLIPIDEMIA  - Patient has a long history of significant Hyperlipidemia requiring medication for treatment with recent fair control. Patient reports no problems or side effects with the medication.       MEDICAL HISTORY:     Patient Active Problem List    Diagnosis Date Noted     Screening for diabetic peripheral neuropathy 01/09/2020     Priority: Medium     Traumatic incomplete tear of right rotator cuff, subsequent encounter 01/09/2020     Priority: Medium     Claustrophobia 01/09/2020     Priority: Medium     Hx of repair of left rotator cuff 01/09/2020     Priority: Medium     Mixed simple and mucopurulent chronic bronchitis (H): spirometry  1-20  FVC&FEV1 = 1% 01/09/2020     Priority: Medium     Hypoxia since 2013 02/12/2019     Priority: Medium     Fatty liver per 5-13 CT  02/12/2019     Priority: Medium     History of colonic polyps one per 2-08 w fam hx same 02/12/2019     Priority: Medium     Abnormal CT scan of lung 5-13 lingular nodule  02/12/2019     Priority: Medium     Bronchitis subacute onset 11-18 02/12/2019     Priority: Medium     Class 2 severe obesity due to excess calories with serious comorbidity and body mass index (BMI) of 38.0 to 38.9 in adult (H) 02/02/2019     Priority: Medium     Mixed hyperlipidemia 02/02/2019     Priority: Medium     Uncontrolled diabetes mellitus type 2 without complications (H) 02/02/2019     Priority: Medium     Nonsmoker 02/02/2019     Priority: Medium     Diaphragm paralysis 09/04/2013     Priority: Medium     SOB (shortness of breath) 05/20/2013     Priority: Medium     CARDIOVASCULAR SCREENING; LDL GOAL LESS THAN 160 02/10/2010     Priority: Medium     Anal fissure 01/27/2003     Priority: Medium     dr schaffer        Past Medical History:   Diagnosis Date     h/o Rheumatic fever      Hemidiaphragm paralysis - right      Past Surgical History:   Procedure Laterality Date     C APPENDECTOMY  1980's     SHOULDER SURGERY Left 2007    s/p trauma     TONSILLECTOMY, ADENOIDECTOMY,  COMBINED       Current Outpatient Medications   Medication Sig Dispense Refill     albuterol (ACCUNEB) 1.25 MG/3ML neb solution Take 1 vial (1.25 mg) by nebulization every 6 hours as needed for shortness of breath / dyspnea or wheezing (Patient not taking: Reported on 1/9/2020) 40 vial 0     budesonide (PULMICORT) 1 MG/2ML neb solution Take 2 mLs (1 mg) by nebulization 2 times daily (Patient not taking: Reported on 1/9/2020) 60 ampule 1     metFORMIN (GLUCOPHAGE) 500 MG tablet TAKE 1 TABLET BY MOUTH TWICE DAILY WITH MEALS (Patient not taking: Reported on 1/9/2020) 60 tablet 0     NO ACTIVE MEDICATIONS        order for DME Equipment being ordered: Nebulizer (Patient not taking: Reported on 1/9/2020) 1 Device 0     simvastatin (ZOCOR) 20 MG tablet TAKE 1 TABLET BY MOUTH EVERY NIGHT AT BEDTIME (Patient not taking: Reported on 1/9/2020) 30 tablet 0     OTC products: None, except as noted above    Allergies   Allergen Reactions     No Known Drug Allergies       Latex Allergy: NO    Social History     Tobacco Use     Smoking status: Never Smoker     Smokeless tobacco: Former User     Tobacco comment: passive user   Substance Use Topics     Alcohol use: Yes     Alcohol/week: 6.7 standard drinks     Comment: very little     History   Drug Use No       REVIEW OF SYSTEMS:   CONSTITUTIONAL: NEGATIVE for fever, chills, change in weight  INTEGUMENTARY/SKIN: NEGATIVE for worrisome rashes, moles or lesions  EYES: NEGATIVE for vision changes or irritation  ENT/MOUTH: NEGATIVE for ear, mouth and throat problems  RESP:POSITIVE for , cough-productive, dyspnea on exertion and Hx COPD  BREAST: NEGATIVE for masses, tenderness or discharge  CV: NEGATIVE for chest pain, palpitations or peripheral edema  GI: NEGATIVE for nausea, abdominal pain, heartburn, or change in bowel habits  : NEGATIVE for frequency, dysuria, or hematuria  MUSCULOSKELETAL:NEGATIVE for significant arthralgias or myalgia, POSITIVE  for  and joint stiffness RT  "SHOULDER  NEURO: NEGATIVE for weakness, dizziness or paresthesias  ENDOCRINE: NEGATIVE for temperature intolerance, skin/hair changes  HEME: NEGATIVE for bleeding problems  PSYCHIATRIC: NEGATIVE for changes in mood or affect    EXAM:   /82 (Patient Position: Sitting, Cuff Size: Adult Large)   Pulse 83   Temp 98  F (36.7  C) (Tympanic)   Resp 18   Ht 1.702 m (5' 7\")   Wt 110.7 kg (244 lb)   SpO2 96%   BMI 38.22 kg/m      GENERAL APPEARANCE: healthy, alert, active, no distress, cooperative, smiling and obese      EYES: EOMI,  PERRL     NECK: no adenopathy, no asymmetry, masses, or scars and thyroid normal to palpation     RESP: lungs clear to auscultation - no rales, rhonchi or wheezes POS barrelled chest      CV: regular rates and rhythm, normal S1 S2, no S3 or S4 and no murmur, click or rub     ABDOMEN:  soft, nontender, no HSM or masses and bowel sounds normal     MS: extremities normal- no gross deformities noted, no evidence of inflammation in joints, FROM in all extremities.incl the RT shoulder      SKIN: no suspicious lesions or rashes     NEURO: Normal strength and tone, sensory exam grossly normal, mentation intact and speech normal     PSYCH: mentation appears normal. and affect normal/bright  Diabetic Foot Exam: No sores, ulcers, erthematous pressure areas; good DP& PT pulses and normal capillary filling time ;normal sensation to monofilament  testing       DIAGNOSTICS:     EKG: appears normal, NSR, normal axis, normal intervals, no acute ST/T changes c/w ischemia, no LVH by voltage criteria, unchanged from previous tracings  Chest XRay: normal except for elevated Rt hemidiaphragm   Labs Resulted Today:   Results for orders placed or performed in visit on 01/09/20   Spirometry, Breathing Capacity: Normal Order, Clinic Performed     Status: None   Result Value Ref Range    FEV-1 0.17     FVC 0.22     FEV1/FVC 79     FEF 25/75 0.16    Hemoglobin A1c     Status: Abnormal   Result Value Ref Range "    Hemoglobin A1C 7.6 (H) 0 - 5.6 %   CBC with platelets differential     Status: None   Result Value Ref Range    WBC 6.6 4.0 - 11.0 10e9/L    RBC Count 5.42 4.4 - 5.9 10e12/L    Hemoglobin 15.9 13.3 - 17.7 g/dL    Hematocrit 47.2 40.0 - 53.0 %    MCV 87 78 - 100 fl    MCH 29.3 26.5 - 33.0 pg    MCHC 33.7 31.5 - 36.5 g/dL    RDW 12.4 10.0 - 15.0 %    Platelet Count 217 150 - 450 10e9/L    Diff Method Automated Method     % Neutrophils 60.0 %    % Lymphocytes 29.3 %    % Monocytes 8.5 %    % Eosinophils 1.4 %    % Basophils 0.8 %    Absolute Neutrophil 4.0 1.6 - 8.3 10e9/L    Absolute Lymphocytes 1.9 0.8 - 5.3 10e9/L    Absolute Monocytes 0.6 0.0 - 1.3 10e9/L    Absolute Eosinophils 0.1 0.0 - 0.7 10e9/L    Absolute Basophils 0.1 0.0 - 0.2 10e9/L       Recent Labs   Lab Test 02/02/19  1123 01/03/19  0236   HGB 15.0 14.9    315    137   POTASSIUM 4.5 4.1   CR 0.92 0.98   A1C 8.2*  --           IMPRESSION:   Reason for surgery/procedure: Rt rotator cuff strain   Diagnosis/reason for consult: medical review      The proposed surgical procedure is considered INTERMEDIATE risk.    REVISED CARDIAC RISK INDEX  The patient has the following serious cardiovascular risks for perioperative complications such as (MI, PE, VFib and 3  AV Block):  No serious cardiac risks  INTERPRETATION: 0 risks: Class I (very low risk - 0.4% complication rate)    The patient has the following additional risks for perioperative complications:  No identified additional risks      ICD-10-CM    1. Preop general physical exam Z01.818 CBC with platelets differential     EKG 12-lead complete w/read - Clinics     XR Chest 2 Views     Spirometry, Breathing Capacity: Normal Order, Clinic Performed   2. Traumatic incomplete tear of right rotator cuff, subsequent encounter S46.011D CBC with platelets differential   3. Hx of repair of left rotator cuff Z98.890    4. Type 2 diabetes mellitus without complication, without long-term current use of  insulin (H)-new Dx  w hyperglycemia  E11.9 Basic metabolic panel     Hemoglobin A1c     TSH with free T4 reflex     Albumin Random Urine Quantitative with Creat Ratio     C FOOT EXAM  NO CHARGE     EKG 12-lead complete w/read - Clinics   5. Uncontrolled diabetes mellitus type 2 without complications (H) E11.65 Basic metabolic panel     Hemoglobin A1c     TSH with free T4 reflex     Albumin Random Urine Quantitative with Creat Ratio     C FOOT EXAM  NO CHARGE     EKG 12-lead complete w/read - Clinics   6. Mixed simple and mucopurulent chronic bronchitis (H): spirometry  1-20  FVC&FEV1 = 1% J41.8 COPD ACTION PLAN   7. Hypoxia since 2013 R09.02 XR Chest 2 Views     Spirometry, Breathing Capacity: Normal Order, Clinic Performed   8. Abnormal CT scan of lung 5-13 lingular nodule w elevated Rt hemidiaphragm  R91.8    9. Mixed hyperlipidemia-uncontrolled E78.2    10. Fatty liver per 5-13 CT  K76.0 ALT   11. History of colonic polyps one per 2-08 w fam hx same Z86.010 GASTROENTEROLOGY ADULT REF PROCEDURE ONLY Zara Fish [phone]; No Provider Preference   12. Claustrophobia F40.240    13. Class 2 severe obesity due to excess calories with serious comorbidity and body mass index (BMI) of 38.0 to 38.9 in adult (H) E66.01 ALT    Z68.38    14. Screening for diabetic peripheral neuropathy Z13.89 C FOOT EXAM  NO CHARGE       RECOMMENDATIONS:     Patient Instructions     Before Your Surgery      Call your surgeon if there is any change in your health. This includes signs of a cold or flu (such as a sore throat, runny nose, cough, rash or fever).    Do not smoke, drink alcohol or take over the counter medicine (unless your surgeon or primary care doctor tells you to) for the 24 hours before and after surgery.    If you take prescribed drugs: Follow your doctor s orders about which medicines to take and which to stop until after surgery.    Eating and drinking prior to surgery: follow the instructions from your  surgeon    Take a shower or bath the night before surgery. Use the soap your surgeon gave you to gently clean your skin. If you do not have soap from your surgeon, use your regular soap. Do not shave or scrub the surgery site.  Wear clean pajamas and have clean sheets on your bed.     1. Please stop fish oil,  aspirin, any NSAIDs ( incuding aleve advil, ibuprofen, etc--use tylenol= acetaminophen instead)  vitamin D, and multivitamins for 7 days prior to and 7 days after surgery     2.  Weight Loss Tips  1. Do not eat after 6 hrs before your expected bedtime  2. Have your heaviest meal for breakfast, a slightly lighter meal at lunch and a snack 6 hrs before bed  3. No sugar/calorie drinks except milk ie no fruit juice, pop, alcohol.  4. Drink milk 30min before meals to decrease your hunger. Also it is excellent as part of your last meal of the day snack  5. Drink lots of water  6. Increase fiber in diet: all bran cereal, salads, popcorn etc  7. Have only one small serving of fruit a day about 1/2 cup (as this is high in sugar)  8. EXERCISE is the bottom line. Without it, you will gain weight even on a low calorie diet. Best if done 2-3X a day as can    Being overweight contributes to high blood pressure and high cholesterol, both of which cause heart attacks, strokes and kidney failure, prediabetes and diabetes, arthritis, and liver disease     You must also decrease your caloric intake and especially decrease the carbs or carbohydrates as these are the most harmful regarding the above health risks    3. Shingrex is a 2 shot series that prevents shingles 97% of the time, as opposed to the old shingles shot that only prevented it at 40-50%  It costs less for medicare at a pharmacy  You should get it starting at 50 yrs old get the 2nd shot 5-6 mo after the first one    4. You have refused a f/u of the 2013 CT of lungs for the Lt nodule         --Patient is to take all scheduled medications on the day of surgery EXCEPT  for modifications listed below.    APPROVAL GIVEN to proceed with proposed procedure, without further diagnostic evaluation   ANESTHESIA :  Pt with hypoxia and COPD with poor lung volumes ---    Weight management plan: Discussed healthy diet and exercise guidelines      ,Time spent with the patient 50mins, more than 50% in counseling and coordinating care, Re above medical problems.    1. COPD   - severe restriction and decreased lung volumes   -hypoxia   - no present Rx   -pt denies all symptoms   -NEEDS CLOSE ATTENTION DURING SURGERY     2. Abnormal CT  In 5-13 with nodules  Pt refuses the requested f/u in 2012 because states is severely claustrophobic     3. Morbid obesity   -increases his risk with anesthesia, especially with the COPD   -has lost some wt--> needs to lose more   -is a candidate for bariatric surgery     4. DM  - uncontrolled for yrs  - over the last 6 mo has started to diet and exercise and A1 C is down from8.2 in 2-19 to 7.6 now     5. HYPERLIPIDEMIA   - still hi on 20mgm simvastatin   -need to watch LFTs with his fatty liver and the use of a statin     NEEDS TO BE SEEN POST OP IN F/U OF ALL THE ABOVE     2-4-20 PREOP REVIEWED   Updated for surgery   No changes in pt condition  Still cleared for surgery     Signed Electronically by: Bere Middleton MD    Copy of this evaluation report is provided to requesting physician.    [facility] Preop Guidelines    Revised Cardiac Risk Index

## 2020-01-09 NOTE — LETTER
[facility]  [address]  [address]  [facility] [id number] [phone]                                                                                                           Donis Barraza  [address] [facility] [address]    January 13, 2020      Dear Donis,    Results for orders placed or performed in visit on 01/09/20   Spirometry, Breathing Capacity: Normal Order, Clinic Performed     Status: None   Result Value Ref Range    FEV-1 0.17     FVC 0.22     FEV1/FVC 79     FEF 25/75 0.16    Basic metabolic panel     Status: Abnormal   Result Value Ref Range    Sodium 138 133 - 144 mmol/L    Potassium 4.9 3.4 - 5.3 mmol/L    Chloride 105 94 - 109 mmol/L    Carbon Dioxide 29 20 - 32 mmol/L    Anion Gap 4 3 - 14 mmol/L    Glucose 183 (H) 70 - 99 mg/dL    Urea Nitrogen 19 7 - 30 mg/dL    Creatinine 0.93 0.66 - 1.25 mg/dL    GFR Estimate 90 >60 mL/min/[1.73_m2]    GFR Estimate If Black >90 >60 mL/min/[1.73_m2]    Calcium 9.3 8.5 - 10.1 mg/dL   Hemoglobin A1c     Status: Abnormal   Result Value Ref Range    Hemoglobin A1C 7.6 (H) 0 - 5.6 %   TSH with free T4 reflex     Status: None   Result Value Ref Range    TSH 1.04 0.40 - 4.00 mU/L   CBC with platelets differential     Status: None   Result Value Ref Range    WBC 6.6 4.0 - 11.0 10e9/L    RBC Count 5.42 4.4 - 5.9 10e12/L    Hemoglobin 15.9 13.3 - 17.7 g/dL    Hematocrit 47.2 40.0 - 53.0 %    MCV 87 78 - 100 fl    MCH 29.3 26.5 - 33.0 pg    MCHC 33.7 31.5 - 36.5 g/dL    RDW 12.4 10.0 - 15.0 %    Platelet Count 217 150 - 450 10e9/L    Diff Method Automated Method     % Neutrophils 60.0 %    % Lymphocytes 29.3 %    % Monocytes 8.5 %    % Eosinophils 1.4 %    % Basophils 0.8 %    Absolute Neutrophil 4.0 1.6 - 8.3 10e9/L    Absolute Lymphocytes 1.9 0.8 - 5.3 10e9/L    Absolute Monocytes 0.6 0.0 - 1.3 10e9/L    Absolute Eosinophils 0.1 0.0 - 0.7 10e9/L    Absolute Basophils 0.1 0.0 - 0.2 10e9/L   Albumin  Random Urine Quantitative with Creat Ratio     Status: None   Result Value Ref Range    Creatinine Urine 137 mg/dL    Albumin Urine mg/L 14 mg/L    Albumin Urine mg/g Cr 10.00 0 - 17 mg/g Cr   ALT     Status: None   Result Value Ref Range    ALT 33 0 - 70 U/L     All of your lab results are normal, except as noted below.     The following are explanations of some of our lab tests     THIS DOES NOT MEAN THAT YOU HAD ALL OF THESE DONE     Please continue on the same medications unless   a change is noted above     These are some general explanations for tests:     Hgb is the blood iron level   WBC means White Blood Cells   Platelets are small blood cells that help with forming the blood clots along with other blood factors.   Electrolytes ar   e Sodium, Potassium, Calcium, Magnesium, Phosphorus.   Liver tests are: AST, ALT, Bilirubin, Alkaline Phosphatase.   Kidney tests are Creatinine, GFR.   HDL Cholesterol - is the good cholesterol and it is good to have it high.   LDL cholesterol is the bad ch   olesterol and it is good to have it low.   It is recommended to have LDL less than 130 for people with hypertension and to have it less than 100 for people with heart disease, diabetes and chronic kidney disease.   Triglycerides are another type of lipid a   nd can also cause heart disease so should be kept low.   Thyroid tests are TSH, T4, T3   Glucose is sugar   A1c is a test that gives us an idea about how well was controlled the diabetes for the last 3 months. ###shows that diabetes is out of control###PLEASE return after surgery to discuss better treatment cherrie  Won't have the high heart and stroke risk ###     PSA stands for Prostate Specific Antigen and    it can be elevated with prostate cancer or prostate inflammation.     Thank you for choosing [facility].  We appreciate the opportunity to serve you and look forward to supporting your healthcare needs in the future.    If you have any  questions or concerns, please call me or my staff at [phone].      Sincerely,    Bere Middleton MD

## 2020-01-09 NOTE — LETTER
January 10, 2020      Donis Barraza  [address] [facility] [address]        Dear ,    We are writing to inform you of your test results.    Please see attached lab results   They are all normal     THE FOLLOWING ARE EXPLANATIONS OF SOME OF OUR LAB TESTS     YOU DID NOT NECESSARILY HAVE ALL OF THESE DONE     Hgb is the blood iron level   WBC means White Blood Cells   Platelets are small blood    cells that help with forming the blood clots along with other blood factors.   Electrolytes are Sodium, Potassium, Calcium, Magnesium, Phosphorus.   Liver tests are: AST, ALT, Bilirubin, Alkaline Phosphatase.   Kidney tests are Creatinine, GFR.   HDL Choles   terol - is the good cholesterol and it is good to have it high.   LDL cholesterol is the bad cholesterol and it is good to have it low.   It is recommended to have LDL less than 130 for people with hypertension and to have it less than 100 for people with   heart disease, diabetes and chronic kidney disease.   Triglycerides are another type of lipid that can cause heart disease, like the cholesterol and should be kept low   Thyroid tests are TSH, T4, T3   Glucose is sugar.   A1c is a test that gives us an idea    about how well was controlled the diabetes for the last 3 months.   PSA stands for Prostate Specific Antigen and it can be elevated with prostate cancer or prostate inflammation.     Please continue on the same medications     Your chest x ray is unchanged  ie elevated Rt hemidiaphragm     EKG is normal        If you have any questions or concerns, please call the clinic at the number listed above.       Sincerely,        Bere Middleton MD

## 2020-01-09 NOTE — LETTER
My COPD Action Plan     Name: Donis Barraza    YOB: 1960   Date: 1/9/2020    My doctor: Bere Middleton MD   My clinic: [address] [address]  [phone]  My Controller Medicine: 0   Dose: 0     My Rescue Medicine: 0   Dose: 0     My Flare Up Medicine: 0   Dose: 0 FEV-1 (no units)   Date Value   01/09/2020 0.17     FEV1/FVC (no units)   Date Value   01/09/2020 79      My COPD Severity: Very Severe = FeV1 < 30 %      Use of Oxygen: Oxygen Not Prescribed      Make sure you've had your pneumonia   vaccines.          GREEN ZONE       Doing well today      Usual level of activity and exercise    Usual amount of cough and mucus    No shortness of breath    Usual level of health (thinking clearly, sleeping well, feel like eating) Actions:      Take daily medicines    Use oxygen as prescribed    Follow regular exercise and diet plan    Avoid cigarette smoke and other irritants that harm the lungs           YELLOW ZONE          Having a bad day or flare up      Short of breath more than usual    A lot more sputum (mucus) than usual    Sputum looks yellow, green, tan, brown or bloody    More coughing or wheezing    Fever or chills    Less energy; trouble completing activities    Trouble thinking or focusing    Using quick relief inhaler or nebulizer more often    Poor sleep; symptoms wake me up    Do not feel like eating Actions:      Get plenty of rest    Take daily medicines    Use quick relief inhaler every -- hours    If you use oxygen, call you doctor to see if you should adjust your oxygen    Do breathing exercises or other things to help you relax    Let a loved one, friend or neighbor know you are feeling worse    Call your care team if you have 2 or more symptoms.  Start taking steroids or antibiotics if directed by your care team           RED ZONE       Need medical care now      Severe shortness of breath  (feel you can't breathe)    Fever, chills    Not enough breath to do any activity    Trouble coughing up mucus, walking or talking    Blood in mucus    Frequent coughing   Rescue medicines are not working    Not able to sleep because of breathing    Feel confused or drowsy    Chest pain    Actions:      Call your health care team.  If you cannot reach your care team, call 911 or go to the emergency room.        Annual Reminders:  Meet with Care Team, Flu Shot every Fall  Pharmacy:    [id number] - [place], [place] - [address] [facility]  [address]  [id number] - [place], [place] - [address] AT [facility] [address] [facility]

## 2020-01-09 NOTE — RESULT ENCOUNTER NOTE
.  Please see attached lab results  They are all normal     THE FOLLOWING ARE EXPLANATIONS OF SOME OF OUR LAB TESTS     YOU DID NOT NECESSARILY HAVE ALL OF THESE DONE     Hgb is the blood iron level  WBC means White Blood Cells  Platelets are small blood   cells that help with forming the blood clots along with other blood factors.  Electrolytes are Sodium, Potassium, Calcium, Magnesium, Phosphorus.  Liver tests are: AST, ALT, Bilirubin, Alkaline Phosphatase.  Kidney tests are Creatinine, GFR.  HDL Choles  terol - is the good cholesterol and it is good to have it high.  LDL cholesterol is the bad cholesterol and it is good to have it low.  It is recommended to have LDL less than 130 for people with hypertension and to have it less than 100 for people with   heart disease, diabetes and chronic kidney disease.  Triglycerides are another type of lipid that can cause heart disease, like the cholesterol and should be kept low   Thyroid tests are TSH, T4, T3  Glucose is sugar.  A1c is a test that gives us an idea   about how well was controlled the diabetes for the last 3 months.   PSA stands for Prostate Specific Antigen and it can be elevated with prostate cancer or prostate inflammation.    Please continue on the same medications    Your chest x ray is unchanged  ie elevated Rt hemidiaphragm     EKG is normal

## 2020-01-09 NOTE — PATIENT INSTRUCTIONS
Before Your Surgery      Call your surgeon if there is any change in your health. This includes signs of a cold or flu (such as a sore throat, runny nose, cough, rash or fever).    Do not smoke, drink alcohol or take over the counter medicine (unless your surgeon or primary care doctor tells you to) for the 24 hours before and after surgery.    If you take prescribed drugs: Follow your doctor s orders about which medicines to take and which to stop until after surgery.    Eating and drinking prior to surgery: follow the instructions from your surgeon    Take a shower or bath the night before surgery. Use the soap your surgeon gave you to gently clean your skin. If you do not have soap from your surgeon, use your regular soap. Do not shave or scrub the surgery site.  Wear clean pajamas and have clean sheets on your bed.     1. Please stop fish oil,  aspirin, any NSAIDs ( incuding aleve advil, ibuprofen, etc--use tylenol= acetaminophen instead)  vitamin D, and multivitamins for 7 days prior to and 7 days after surgery     2.  Weight Loss Tips  1. Do not eat after 6 hrs before your expected bedtime  2. Have your heaviest meal for breakfast, a slightly lighter meal at lunch and a snack 6 hrs before bed  3. No sugar/calorie drinks except milk ie no fruit juice, pop, alcohol.  4. Drink milk 30min before meals to decrease your hunger. Also it is excellent as part of your last meal of the day snack  5. Drink lots of water  6. Increase fiber in diet: all bran cereal, salads, popcorn etc  7. Have only one small serving of fruit a day about 1/2 cup (as this is high in sugar)  8. EXERCISE is the bottom line. Without it, you will gain weight even on a low calorie diet. Best if done 2-3X a day as can    Being overweight contributes to high blood pressure and high cholesterol, both of which cause heart attacks, strokes and kidney failure, prediabetes and diabetes, arthritis, and liver disease     You must also decrease your  caloric intake and especially decrease the carbs or carbohydrates as these are the most harmful regarding the above health risks    3. Shingrex is a 2 shot series that prevents shingles 97% of the time, as opposed to the old shingles shot that only prevented it at 40-50%  It costs less for medicare at a pharmacy  You should get it starting at 50 yrs old get the 2nd shot 5-6 mo after the first one    4. You have refused a f/u of the 2013 CT of lungs for the Lt nodule

## 2020-01-10 LAB
ALT SERPL W P-5'-P-CCNC: 33 U/L (ref 0–70)
ANION GAP SERPL CALCULATED.3IONS-SCNC: 4 MMOL/L (ref 3–14)
BUN SERPL-MCNC: 19 MG/DL (ref 7–30)
CALCIUM SERPL-MCNC: 9.3 MG/DL (ref 8.5–10.1)
CHLORIDE SERPL-SCNC: 105 MMOL/L (ref 94–109)
CO2 SERPL-SCNC: 29 MMOL/L (ref 20–32)
CREAT SERPL-MCNC: 0.93 MG/DL (ref 0.66–1.25)
CREAT UR-MCNC: 137 MG/DL
GFR SERPL CREATININE-BSD FRML MDRD: 90 ML/MIN/{1.73_M2}
GLUCOSE SERPL-MCNC: 183 MG/DL (ref 70–99)
MICROALBUMIN UR-MCNC: 14 MG/L
MICROALBUMIN/CREAT UR: 10 MG/G CR (ref 0–17)
POTASSIUM SERPL-SCNC: 4.9 MMOL/L (ref 3.4–5.3)
SODIUM SERPL-SCNC: 138 MMOL/L (ref 133–144)
TSH SERPL DL<=0.005 MIU/L-ACNC: 1.04 MU/L (ref 0.4–4)

## 2020-01-10 NOTE — RESULT ENCOUNTER NOTE
Please see attached lab results  All of your lab results are normal, except as noted below.    The following are explanations of some of our lab tests    THIS DOES NOT MEAN THAT YOU HAD ALL OF THESE DONE    Please continue on the same medications unless   a change is noted above    These are some general explanations for tests:    Hgb is the blood iron level  WBC means White Blood Cells  Platelets are small blood cells that help with forming the blood clots along with other blood factors.  Electrolytes ar  e Sodium, Potassium, Calcium, Magnesium, Phosphorus.  Liver tests are: AST, ALT, Bilirubin, Alkaline Phosphatase.  Kidney tests are Creatinine, GFR.  HDL Cholesterol - is the good cholesterol and it is good to have it high.  LDL cholesterol is the bad ch  olesterol and it is good to have it low.  It is recommended to have LDL less than 130 for people with hypertension and to have it less than 100 for people with heart disease, diabetes and chronic kidney disease.  Triglycerides are another type of lipid a  nd can also cause heart disease so should be kept low.   Thyroid tests are TSH, T4, T3  Glucose is sugar  A1c is a test that gives us an idea about how well was controlled the diabetes for the last 3 months. ###shows that diabetes is out of control###PLEASE return after surgery to discuss better treatment cherrie  Won't have the high heart and stroke risk ###    PSA stands for Prostate Specific Antigen and   it can be elevated with prostate cancer or prostate inflammation.

## 2020-02-04 ENCOUNTER — TELEPHONE (OUTPATIENT)
Dept: FAMILY MEDICINE | Facility: CLINIC | Age: 60
End: 2020-02-04

## 2020-02-04 NOTE — TELEPHONE ENCOUNTER
Routing to provider:    RN called to [facility]- [phone].    -Pre-op will be outdated (31 days)  -Pre-op will need to be updated  -Either pt needs to get a new pre-op, or     MD will need to addend the preop.    Addendum to say something along the lines of: Provider reviewed per-op. No changes to pt condition. Pt cleared to continue with surgery.

## 2020-02-04 NOTE — TELEPHONE ENCOUNTER
Reason for Call:  Other needs update of pre op that was done 1/9/20-surgery was changed to 2/10/2020    Detailed comments: please update and fax to Zara at fax -new surgery date 2/10    Phone Number Patient can be reached at: Home number on file [phone] (home)    Best Time:     Can we leave a detailed message on this number? YES    Call taken on 2/4/2020 at 11:07 AM by DEDRICK MAYERS

## 2020-02-05 NOTE — TELEPHONE ENCOUNTER
Patient called this morning to check status of joshua-op.  TC printed addended per-op and faxed to [facility] at [phone] and ENRIQUE Alfred at [phone] per patient's request.

## 2020-02-10 ENCOUNTER — ANESTHESIA (OUTPATIENT)
Dept: SURGERY | Facility: CLINIC | Age: 60
End: 2020-02-10
Payer: COMMERCIAL

## 2020-02-10 ENCOUNTER — ANESTHESIA EVENT (OUTPATIENT)
Dept: SURGERY | Facility: CLINIC | Age: 60
End: 2020-02-10
Payer: COMMERCIAL

## 2020-02-10 ENCOUNTER — HOSPITAL ENCOUNTER (OUTPATIENT)
Facility: CLINIC | Age: 60
Discharge: HOME | End: 2020-02-10
Attending: ORTHOPAEDIC SURGERY | Admitting: ORTHOPAEDIC SURGERY
Payer: COMMERCIAL

## 2020-02-10 VITALS
RESPIRATION RATE: 18 BRPM | HEIGHT: 67 IN | HEART RATE: 68 BPM | BODY MASS INDEX: 25.9 KG/M2 | SYSTOLIC BLOOD PRESSURE: 123 MMHG | TEMPERATURE: 98 F | WEIGHT: 165 LBS | DIASTOLIC BLOOD PRESSURE: 81 MMHG | OXYGEN SATURATION: 97 %

## 2020-02-10 DIAGNOSIS — M67.911 DISORDER OF RIGHT ROTATOR CUFF: Primary | ICD-10-CM

## 2020-02-10 LAB
CREAT SERPL-MCNC: 0.93 MG/DL (ref 0.66–1.25)
GFR SERPL CREATININE-BSD FRML MDRD: 90 ML/MIN/{1.73_M2}
GLUCOSE BLDC GLUCOMTR-MCNC: 165 MG/DL (ref 70–99)
GLUCOSE BLDC GLUCOMTR-MCNC: 206 MG/DL (ref 70–99)
POTASSIUM SERPL-SCNC: 4.5 MMOL/L (ref 3.4–5.3)

## 2020-02-10 PROCEDURE — 84132 ASSAY OF SERUM POTASSIUM: CPT | Performed by: ANESTHESIOLOGY

## 2020-02-10 PROCEDURE — 71000012 ZZH RECOVERY PHASE 1 LEVEL 1 FIRST HR: Performed by: ORTHOPAEDIC SURGERY

## 2020-02-10 PROCEDURE — 36000093 ZZH SURGERY LEVEL 4 1ST 30 MIN: Performed by: ORTHOPAEDIC SURGERY

## 2020-02-10 PROCEDURE — C1713 ANCHOR/SCREW BN/BN,TIS/BN: HCPCS | Performed by: ORTHOPAEDIC SURGERY

## 2020-02-10 PROCEDURE — 71000013 ZZH RECOVERY PHASE 1 LEVEL 1 EA ADDTL HR: Performed by: ORTHOPAEDIC SURGERY

## 2020-02-10 PROCEDURE — C9290 INJ, BUPIVACAINE LIPOSOME: HCPCS | Performed by: ANESTHESIOLOGY

## 2020-02-10 PROCEDURE — 27210794 ZZH OR GENERAL SUPPLY STERILE: Performed by: ORTHOPAEDIC SURGERY

## 2020-02-10 PROCEDURE — 71000027 ZZH RECOVERY PHASE 2 EACH 15 MINS: Performed by: ORTHOPAEDIC SURGERY

## 2020-02-10 PROCEDURE — 37000009 ZZH ANESTHESIA TECHNICAL FEE, EACH ADDTL 15 MIN: Performed by: ORTHOPAEDIC SURGERY

## 2020-02-10 PROCEDURE — 82565 ASSAY OF CREATININE: CPT | Performed by: ANESTHESIOLOGY

## 2020-02-10 PROCEDURE — 25000132 ZZH RX MED GY IP 250 OP 250 PS 637: Performed by: PHYSICIAN ASSISTANT

## 2020-02-10 PROCEDURE — 36000063 ZZH SURGERY LEVEL 4 EA 15 ADDTL MIN: Performed by: ORTHOPAEDIC SURGERY

## 2020-02-10 PROCEDURE — 25000128 H RX IP 250 OP 636: Performed by: ANESTHESIOLOGY

## 2020-02-10 PROCEDURE — 82962 GLUCOSE BLOOD TEST: CPT

## 2020-02-10 PROCEDURE — 37000008 ZZH ANESTHESIA TECHNICAL FEE, 1ST 30 MIN: Performed by: ORTHOPAEDIC SURGERY

## 2020-02-10 PROCEDURE — 25000125 ZZHC RX 250: Performed by: ANESTHESIOLOGY

## 2020-02-10 PROCEDURE — 25000132 ZZH RX MED GY IP 250 OP 250 PS 637: Performed by: ORTHOPAEDIC SURGERY

## 2020-02-10 PROCEDURE — 25000128 H RX IP 250 OP 636: Performed by: PHYSICIAN ASSISTANT

## 2020-02-10 PROCEDURE — 25800030 ZZH RX IP 258 OP 636: Performed by: ANESTHESIOLOGY

## 2020-02-10 PROCEDURE — 40000306 ZZH STATISTIC PRE PROC ASSESS II: Performed by: ORTHOPAEDIC SURGERY

## 2020-02-10 PROCEDURE — 25000125 ZZHC RX 250: Performed by: NURSE ANESTHETIST, CERTIFIED REGISTERED

## 2020-02-10 PROCEDURE — 25000125 ZZHC RX 250: Performed by: ORTHOPAEDIC SURGERY

## 2020-02-10 PROCEDURE — 25000128 H RX IP 250 OP 636: Performed by: ORTHOPAEDIC SURGERY

## 2020-02-10 PROCEDURE — 36415 COLL VENOUS BLD VENIPUNCTURE: CPT | Performed by: ANESTHESIOLOGY

## 2020-02-10 PROCEDURE — 25000128 H RX IP 250 OP 636: Performed by: NURSE ANESTHETIST, CERTIFIED REGISTERED

## 2020-02-10 PROCEDURE — 27211024 ZZHC OR SUPPLY OTHER OPNP: Performed by: ORTHOPAEDIC SURGERY

## 2020-02-10 DEVICE — TWINFIX ULTRA 4.5 MM TITANIUM                                    ANCHOR WITH TWO NO.2 SUTURES WHITE,                                    COBRAID-BLUE STERILE
Type: IMPLANTABLE DEVICE | Site: SHOULDER | Status: FUNCTIONAL
Brand: TWINFIX

## 2020-02-10 RX ORDER — GLYCOPYRROLATE 0.2 MG/ML
INJECTION, SOLUTION INTRAMUSCULAR; INTRAVENOUS PRN
Status: DISCONTINUED | OUTPATIENT
Start: 2020-02-10 | End: 2020-02-10

## 2020-02-10 RX ORDER — LIDOCAINE HYDROCHLORIDE 10 MG/ML
INJECTION, SOLUTION INFILTRATION; PERINEURAL PRN
Status: DISCONTINUED | OUTPATIENT
Start: 2020-02-10 | End: 2020-02-10

## 2020-02-10 RX ORDER — FENTANYL CITRATE 50 UG/ML
INJECTION, SOLUTION INTRAMUSCULAR; INTRAVENOUS PRN
Status: DISCONTINUED | OUTPATIENT
Start: 2020-02-10 | End: 2020-02-10

## 2020-02-10 RX ORDER — NEOSTIGMINE METHYLSULFATE 1 MG/ML
VIAL (ML) INJECTION PRN
Status: DISCONTINUED | OUTPATIENT
Start: 2020-02-10 | End: 2020-02-10

## 2020-02-10 RX ORDER — MAGNESIUM HYDROXIDE 1200 MG/15ML
LIQUID ORAL PRN
Status: DISCONTINUED | OUTPATIENT
Start: 2020-02-10 | End: 2020-02-10 | Stop reason: HOSPADM

## 2020-02-10 RX ORDER — CEFAZOLIN SODIUM 1 G/3ML
1 INJECTION, POWDER, FOR SOLUTION INTRAMUSCULAR; INTRAVENOUS SEE ADMIN INSTRUCTIONS
Status: DISCONTINUED | OUTPATIENT
Start: 2020-02-10 | End: 2020-02-10 | Stop reason: HOSPADM

## 2020-02-10 RX ORDER — PROPOFOL 10 MG/ML
INJECTION, EMULSION INTRAVENOUS PRN
Status: DISCONTINUED | OUTPATIENT
Start: 2020-02-10 | End: 2020-02-10

## 2020-02-10 RX ORDER — AMOXICILLIN 250 MG
1-2 CAPSULE ORAL 2 TIMES DAILY
Qty: 30 TABLET | Refills: 0 | Status: SHIPPED | OUTPATIENT
Start: 2020-02-10 | End: 2020-05-27

## 2020-02-10 RX ORDER — ONDANSETRON 2 MG/ML
INJECTION INTRAMUSCULAR; INTRAVENOUS PRN
Status: DISCONTINUED | OUTPATIENT
Start: 2020-02-10 | End: 2020-02-10

## 2020-02-10 RX ORDER — ACETAMINOPHEN 325 MG/1
975 TABLET ORAL ONCE
Status: COMPLETED | OUTPATIENT
Start: 2020-02-10 | End: 2020-02-10

## 2020-02-10 RX ORDER — TRAMADOL HYDROCHLORIDE 50 MG/1
50 TABLET ORAL EVERY 6 HOURS PRN
Qty: 30 TABLET | Refills: 0 | Status: SHIPPED | OUTPATIENT
Start: 2020-02-10 | End: 2020-06-17

## 2020-02-10 RX ORDER — IBUPROFEN 600 MG/1
600 TABLET, FILM COATED ORAL
Status: DISCONTINUED | OUTPATIENT
Start: 2020-02-10 | End: 2020-02-10 | Stop reason: HOSPADM

## 2020-02-10 RX ORDER — CEFAZOLIN SODIUM 2 G/100ML
2 INJECTION, SOLUTION INTRAVENOUS
Status: COMPLETED | OUTPATIENT
Start: 2020-02-10 | End: 2020-02-10

## 2020-02-10 RX ORDER — BUPIVACAINE HYDROCHLORIDE AND EPINEPHRINE 2.5; 5 MG/ML; UG/ML
INJECTION, SOLUTION INFILTRATION; PERINEURAL PRN
Status: DISCONTINUED | OUTPATIENT
Start: 2020-02-10 | End: 2020-02-10

## 2020-02-10 RX ORDER — EPINEPHRINE 1 MG/ML
INJECTION, SOLUTION INTRAMUSCULAR; SUBCUTANEOUS PRN
Status: DISCONTINUED | OUTPATIENT
Start: 2020-02-10 | End: 2020-02-10 | Stop reason: HOSPADM

## 2020-02-10 RX ORDER — LIDOCAINE 40 MG/G
CREAM TOPICAL
Status: DISCONTINUED | OUTPATIENT
Start: 2020-02-10 | End: 2020-02-10 | Stop reason: HOSPADM

## 2020-02-10 RX ORDER — OXYCODONE HYDROCHLORIDE 5 MG/1
5-10 TABLET ORAL EVERY 4 HOURS PRN
Qty: 30 TABLET | Refills: 0 | Status: SHIPPED | OUTPATIENT
Start: 2020-02-10 | End: 2020-05-27

## 2020-02-10 RX ORDER — SODIUM CHLORIDE, SODIUM LACTATE, POTASSIUM CHLORIDE, CALCIUM CHLORIDE 600; 310; 30; 20 MG/100ML; MG/100ML; MG/100ML; MG/100ML
INJECTION, SOLUTION INTRAVENOUS CONTINUOUS
Status: DISCONTINUED | OUTPATIENT
Start: 2020-02-10 | End: 2020-02-10 | Stop reason: HOSPADM

## 2020-02-10 RX ORDER — DEXAMETHASONE SODIUM PHOSPHATE 4 MG/ML
INJECTION, SOLUTION INTRA-ARTICULAR; INTRALESIONAL; INTRAMUSCULAR; INTRAVENOUS; SOFT TISSUE PRN
Status: DISCONTINUED | OUTPATIENT
Start: 2020-02-10 | End: 2020-02-10

## 2020-02-10 RX ORDER — TRAMADOL HYDROCHLORIDE 50 MG/1
50 TABLET ORAL ONCE
Status: COMPLETED | OUTPATIENT
Start: 2020-02-10 | End: 2020-02-10

## 2020-02-10 RX ADMIN — CEFAZOLIN SODIUM 2 G: 2 INJECTION, SOLUTION INTRAVENOUS at 11:15

## 2020-02-10 RX ADMIN — BUPIVACAINE HYDROCHLORIDE AND EPINEPHRINE BITARTRATE 20 ML: 2.5; .005 INJECTION, SOLUTION EPIDURAL; INFILTRATION; INTRACAUDAL; PERINEURAL at 10:57

## 2020-02-10 RX ADMIN — MIDAZOLAM 2 MG: 1 INJECTION INTRAMUSCULAR; INTRAVENOUS at 10:47

## 2020-02-10 RX ADMIN — LIDOCAINE HYDROCHLORIDE 30 MG: 10 INJECTION, SOLUTION INFILTRATION; PERINEURAL at 11:22

## 2020-02-10 RX ADMIN — FENTANYL CITRATE 100 MCG: 50 INJECTION, SOLUTION INTRAMUSCULAR; INTRAVENOUS at 11:22

## 2020-02-10 RX ADMIN — TRAMADOL HYDROCHLORIDE 50 MG: 50 TABLET, FILM COATED ORAL at 15:10

## 2020-02-10 RX ADMIN — GLYCOPYRROLATE 0.2 MG: 0.2 INJECTION, SOLUTION INTRAMUSCULAR; INTRAVENOUS at 11:22

## 2020-02-10 RX ADMIN — ROCURONIUM BROMIDE 50 MG: 10 INJECTION INTRAVENOUS at 11:22

## 2020-02-10 RX ADMIN — TRANEXAMIC ACID 1 G: 100 INJECTION, SOLUTION INTRAVENOUS at 12:42

## 2020-02-10 RX ADMIN — ACETAMINOPHEN 975 MG: 325 TABLET, FILM COATED ORAL at 09:28

## 2020-02-10 RX ADMIN — HYDROMORPHONE HYDROCHLORIDE 0.5 MG: 1 INJECTION, SOLUTION INTRAMUSCULAR; INTRAVENOUS; SUBCUTANEOUS at 12:33

## 2020-02-10 RX ADMIN — ONDANSETRON HYDROCHLORIDE 4 MG: 2 INJECTION, SOLUTION INTRAVENOUS at 12:08

## 2020-02-10 RX ADMIN — SODIUM CHLORIDE, POTASSIUM CHLORIDE, SODIUM LACTATE AND CALCIUM CHLORIDE: 600; 310; 30; 20 INJECTION, SOLUTION INTRAVENOUS at 11:54

## 2020-02-10 RX ADMIN — PROPOFOL 200 MG: 10 INJECTION, EMULSION INTRAVENOUS at 11:22

## 2020-02-10 RX ADMIN — BUPIVACAINE 10 ML: 13.3 INJECTION, SUSPENSION, LIPOSOMAL INFILTRATION at 10:57

## 2020-02-10 RX ADMIN — SODIUM CHLORIDE, POTASSIUM CHLORIDE, SODIUM LACTATE AND CALCIUM CHLORIDE: 600; 310; 30; 20 INJECTION, SOLUTION INTRAVENOUS at 11:10

## 2020-02-10 RX ADMIN — GLYCOPYRROLATE 0.8 MG: 0.2 INJECTION, SOLUTION INTRAMUSCULAR; INTRAVENOUS at 12:42

## 2020-02-10 RX ADMIN — DEXAMETHASONE SODIUM PHOSPHATE 4 MG: 4 INJECTION, SOLUTION INTRA-ARTICULAR; INTRALESIONAL; INTRAMUSCULAR; INTRAVENOUS; SOFT TISSUE at 11:22

## 2020-02-10 RX ADMIN — Medication 5 MG: at 12:42

## 2020-02-10 NOTE — OP NOTE
Procedure Date: 02/10/2020      PREOPERATIVE DIAGNOSIS:  Chronic large rotator cuff tear, right shoulder.      POSTOPERATIVE DIAGNOSES:     1.  Right shoulder supraspinatus tear.   2.  Right shoulder subscapularis repair, chronic.      PROCEDURE PERFORMED:  Rotator cuff repair, right shoulder.      SURGEON:  Giovani Wood MD.       ASSISTANT:  Sonia Tran PA-C.       ANESTHESIA:  General with block.      ESTIMATED BLOOD LOSS:  100 mL.      COMPLICATIONS:  None.      DESCRIPTION OF PROCEDURE:  The patient was taken to the operating room where after administration of general anesthetic, regional anesthetic, antibiotic prophylaxis and later tranexamic acid, the shoulder was prepped and draped from a beach chair position and an anterolateral incision was made off the deltoid with the deltoid split in line with its fibers for approximately 4 cm.  The biceps was absent.  The subscapularis had a chronic retracted tear with poor tissue quality.  Despite this, due to the patient's young age, I performed a double row repair with a medial [employer] metal anchor and a lateral footprint anchor.  Tissue quality was fair and apposition was fair to 30 degrees of external rotation.      The superior cuff was evaluated.  The subacromial space was quite tight but this was not related to the acromion.  A large approaching mass of retracted tear was noted in a shape posteriorly.  This involved the entire supraspinatus and the apex of the V shape was at the infraspinatus.  The cuff was freshened as was the tuberosity.  Despite superior and inferior releases, the tissue was relatively poorly mobile, indicating a chronic tear.  A side-to-side repair was performed posteriorly using the anchor and a tape suture with an additional rip-stop suture was placed at the supraspinatus and brought through a footprint anchor laterally.  Overall, there was adequate apposition 3-4 millimeters beyond the articular margin.  Overall, tissue  quality was adequate and bone quality good but, as mentioned, the tear was poorly mobile and retracted.  I do not feel that revision repair would be helpful in this case if the tear does not heal.  Substantial additional time was taken for all portions of the case, particularly cuff mobilization and suturing, especially given the superior and anterior cuff tear components.  There were no complications.  After repair, layered anatomic closure was accomplished with a sling.  Of note, the repair became quite tight with extension of the shoulder and efforts will be made to keep his elbow anterior to the midline.         SHAHNAZ SPRAGUE MD             D: 02/10/2020   T: 02/10/2020   MT: DWIGHT      Name:     ROHTI ARMSTRONG   MRN:      [id number]        Account:        [id number]   :      1960           Procedure Date: 02/10/2020      Document: [id number]

## 2020-02-10 NOTE — ANESTHESIA PROCEDURE NOTES
Peripheral nerve/Neuraxial procedure note : Brachial plexus (via interscalene approach)  Pre-Procedure  Performed by Casey Quispe MD  Referred by [place]  Location: pre-op    Procedure Times:2/10/2020 10:46 AM and 2/10/2020 10:59 AM  Pre-Anesthestic Checklist: patient identified, IV checked, site marked, risks and benefits discussed, informed consent, monitors and equipment checked, pre-op evaluation, at physician/surgeon's request and post-op pain management    Timeout  Correct Patient: Yes   Correct Procedure: Yes   Correct Site: Yes   Correct Laterality: Yes   Correct Position: Yes   Site Marked: Yes   .   Procedure Documentation    .    Procedure: Brachial plexus (via interscalene approach), right.   Patient Position:supine   Ultrasound used to identify targeted nerve, plexus, or vascular marker and placed a needle adjacent to it., Ultrasound was used to visualize the spread of the anesthetic in close proximity to the above stated nerve.   Patient Prep/Sterile Barriers; mask, sterile gloves, povidone-iodine 7.5% surgical scrub.  .  Needle: insulated   Needle Gauge: 22.    Needle Length (Inches) 2   Insertion Method: Single Shot.        Assessment/Narrative  Paresthesias: No.  Injection made incrementally with aspirations every 5 mL..  The placement was negative for: blood aspirated, painful injection and site bleeding.  Bolus given via needle..   Secured via.   Complications: none. Test dose of mL at. Test dose negative for signs of intravascular, subdural or intrathecal injection. Comments:  The surgeon has given a verbal order transferring care of this patient to me for the performance of a regional analgesia block for post-op pain control. It is requested of me because I am uniquely trained and qualified to perform this block and the surgeon is neither trained nor qualified to perform this procedure.    20 ml 0.25% bupivacaine with 1:200k epi and 130 mg Exparel

## 2020-02-10 NOTE — OR NURSING
Dr. Quispe, [facility], notified of blood sugar of 206 in PACU. No new orders received.

## 2020-02-10 NOTE — DISCHARGE INSTRUCTIONS
DR. SHAHNAZ SPRAGUE M.D.  CLINIC PHONE NUMBER:  [phone]  [facility] ORTHOP[facility]        GENERAL ANESTHESIA OR SEDATION ADULT DISCHARGE INSTRUCTIONS   SPECIAL PRECAUTIONS FOR 24 HOURS AFTER SURGERY    IT IS NOT UNUSUAL TO FEEL LIGHT-HEADED OR FAINT, UP TO 24 HOURS AFTER SURGERY OR WHILE TAKING PAIN MEDICATION.  IF YOU HAVE THESE SYMPTOMS; SIT FOR A FEW MINUTES BEFORE STANDING AND HAVE SOMEONE ASSIST YOU WHEN YOU GET UP TO WALK OR USE THE BATHROOM.    YOU SHOULD REST AND RELAX FOR THE NEXT 24 HOURS AND YOU MUST MAKE ARRANGEMENTS TO HAVE SOMEONE STAY WITH YOU FOR AT LEAST 24 HOURS AFTER YOUR DISCHARGE.  AVOID HAZARDOUS AND STRENUOUS ACTIVITIES.  DO NOT MAKE IMPORTANT DECISIONS FOR 24 HOURS.    DO NOT DRIVE ANY VEHICLE OR OPERATE MECHANICAL EQUIPMENT FOR 24 HOURS FOLLOWING THE END OF YOUR SURGERY.  EVEN THOUGH YOU MAY FEEL NORMAL, YOUR REACTIONS MAY BE AFFECTED BY THE MEDICATION YOU HAVE RECEIVED.    DO NOT DRINK ALCOHOLIC BEVERAGES FOR 24 HOURS FOLLOWING YOUR SURGERY.    DRINK CLEAR LIQUIDS (APPLE JUICE, GINGER ALE, 7-UP, BROTH, ETC.).  PROGRESS TO YOUR REGULAR DIET AS YOU FEEL ABLE.    YOU MAY HAVE A DRY MOUTH, A SORE THROAT, MUSCLES ACHES OR TROUBLE SLEEPING.  THESE SHOULD GO AWAY AFTER 24 HOURS.    CALL YOUR DOCTOR FOR ANY OF THE FOLLOWING:  SIGNS OF INFECTION (FEVER, GROWING TENDERNESS AT THE SURGERY SITE, A LARGE AMOUNT OF DRAINAGE OR BLEEDING, SEVERE PAIN, FOUL-SMELLING DRAINAGE, REDNESS OR SWELLING.    IT HAS BEEN OVER 8 TO 10 HOURS SINCE SURGERY AND YOU ARE STILL NOT ABLE TO URINATE (PASS WATER).

## 2020-02-10 NOTE — ANESTHESIA CARE TRANSFER NOTE
Patient: Donis Barraza    Procedure(s):  Right shoulder mini open rotator cuff repair (Choice with block)    Diagnosis: Rotator cuff tear [M75.100]  Diagnosis Additional Information: No value filed.    Anesthesia Type:   General, ETT, Peripheral Nerve Block, For Post-op pain in coordination with surgeon     Note:  Airway :Face Mask  Patient transferred to:PACU  Comments: 30 [place] nasal trumpet placed without difficulty.  VSS.  Spontaneously breathing O2 per open face mask.  Report given to RN.Handoff Report: Identifed the Patient, Identified the Reponsible Provider, Reviewed the pertinent medical history, Discussed the surgical course, Reviewed Intra-OP anesthesia mangement and issues during anesthesia, Set expectations for post-procedure period and Allowed opportunity for questions and acknowledgement of understanding      Vitals: (Last set prior to Anesthesia Care Transfer)    CRNA VITALS  2/10/2020 1238 - 2/10/2020 1316      2/10/2020             Pulse:  83    SpO2:  (!) 83 %                Electronically Signed By: KEVIN Calabrese CRNA  February 10, 2020  1:16 PM

## 2020-02-10 NOTE — ANESTHESIA POSTPROCEDURE EVALUATION
Patient: Donis Barraza    Procedure(s):  Right shoulder mini open rotator cuff repair (Choice with block)    Diagnosis:Rotator cuff tear [M75.100]  Diagnosis Additional Information: No value filed.    Anesthesia Type:  General, ETT, Peripheral Nerve Block, For Post-op pain in coordination with surgeon    Note:  Anesthesia Post Evaluation    Patient location during evaluation: PACU  Patient participation: Able to participate in evaluation but full recovery from regional anesthesia has not yet ocurrred but is anticipated to occur within 48 hours  Level of consciousness: awake and alert  Pain management: adequate  multimodal analgesia used between 6 hours prior to anesthesia start to PACU dischargeAirway patency: patent  Cardiovascular status: acceptable  Respiratory status: acceptable  two or more mitigation strategies used for obstructive sleep apneaHydration status: acceptable  PONV: none     Anesthetic complications: None          Last vitals:  Vitals:    02/10/20 1510 02/10/20 1515 02/10/20 1541   BP:  103/74 135/81   Pulse:   68   Resp:  18 20   Temp:   97.3  F (36.3  C)   SpO2: 98% 96% 96%         Electronically Signed By: Casey Quispe MD  February 10, 2020  4:04 PM

## 2020-02-10 NOTE — ANESTHESIA PREPROCEDURE EVALUATION
Anesthesia Pre-Procedure Evaluation    Patient: Donis Barraza   MRN: [id number] : 1960          Preoperative Diagnosis: Rotator cuff tear [M75.100]    Procedure(s):  Right shoulder mini open rotator cuff repair (Choice with block)    Past Medical History:   Diagnosis Date     Arthritis      Diabetes (H)      h/o Rheumatic fever      Hemidiaphragm paralysis - right      Obese      Past Surgical History:   Procedure Laterality Date     C APPENDECTOMY       ORTHOPEDIC SURGERY       SHOULDER SURGERY Left     s/p trauma     TONSILLECTOMY, ADENOIDECTOMY, COMBINED       Anesthesia Evaluation     . Pt has had prior anesthetic. Type: General    No history of anesthetic complications          ROS/MED HX    ENT/Pulmonary:  - neg pulmonary ROS     Neurologic:  - neg neurologic ROS     Cardiovascular:  - neg cardiovascular ROS       METS/Exercise Tolerance:     Hematologic:  - neg hematologic  ROS       Musculoskeletal:  - neg musculoskeletal ROS       GI/Hepatic:     (+) liver disease,       Renal/Genitourinary:         Endo:     (+) type II DM Obesity, .      Psychiatric:  - neg psychiatric ROS       Infectious Disease:  - neg infectious disease ROS       Malignancy:         Other:    - neg other ROS                      Physical Exam  Normal systems: cardiovascular, pulmonary and dental    Airway   Mallampati: II  TM distance: >3 FB  Neck ROM: full    Dental     Cardiovascular       Pulmonary             Lab Results   Component Value Date    WBC 6.6 2020    HGB 15.9 2020    HCT 47.2 2020     2020     2020    POTASSIUM 4.5 02/10/2020    CHLORIDE 105 2020    CO2 29 2020    BUN 19 2020    CR 0.93 02/10/2020     (H) 2020    GUS 9.3 2020    ALBUMIN 4.5 2013    PROTTOTAL 7.8 2013    ALT 33 2020    AST 39 2013    ALKPHOS 87 2013    BILITOTAL 0.4 2013    TSH 1.04 2020    T4 0.80 2008  "      Preop Vitals  BP Readings from Last 3 Encounters:   02/10/20 136/79   01/09/20 128/82   02/12/19 130/80    Pulse Readings from Last 3 Encounters:   01/09/20 83   02/12/19 93   02/02/19 90      Resp Readings from Last 3 Encounters:   01/09/20 18   02/12/19 18   02/02/19 18    SpO2 Readings from Last 3 Encounters:   02/10/20 96%   01/09/20 96%   02/12/19 97%      Temp Readings from Last 1 Encounters:   02/10/20 97  F (36.1  C) (Axillary)    Ht Readings from Last 1 Encounters:   02/10/20 1.702 m (5' 7\")      Wt Readings from Last 1 Encounters:   02/10/20 74.8 kg (165 lb)    Estimated body mass index is 25.84 kg/m  as calculated from the following:    Height as of this encounter: 1.702 m (5' 7\").    Weight as of this encounter: 74.8 kg (165 lb).       Anesthesia Plan      History & Physical Review  History and physical reviewed and following examination; no interval change.    ASA Status:  3 .    NPO Status:  > 8 hours    Plan for General, ETT, Peripheral Nerve Block and For Post-op pain in coordination with surgeon with Intravenous induction. Maintenance will be Balanced.    PONV prophylaxis:  Ondansetron (or other 5HT-3) and Dexamethasone or Solumedrol       Postoperative Care  Postoperative pain management:  Oral pain medications, IV analgesics and Multi-modal analgesia.      Consents  Anesthetic plan, risks, benefits and alternatives discussed with:  Patient..                 Casey Quispe MD                    .  "

## 2020-04-17 ENCOUNTER — DOCUMENTATION ONLY (OUTPATIENT)
Dept: FAMILY MEDICINE | Facility: CLINIC | Age: 60
End: 2020-04-17

## 2020-04-17 NOTE — PROGRESS NOTES
Chart reviewed by Ambulatory Quality and Data team    Please abstract the following data from this visit with this patient into the appropriate field in Epic:    Tests that can be patient reported without a hard copy:        Other Tests found in the patient's chart through Chart Review/Care Everywhere:    Eye exam with ophthalmology on this date: 2/21/19- Negative for Retinopathy    Note to [employer]: If this section is blank, no results were found via Chart Review/Care Everywhere.

## 2020-05-27 ENCOUNTER — OFFICE VISIT (OUTPATIENT)
Dept: FAMILY MEDICINE | Facility: CLINIC | Age: 60
End: 2020-05-27
Payer: COMMERCIAL

## 2020-05-27 VITALS
WEIGHT: 249 LBS | BODY MASS INDEX: 39.08 KG/M2 | RESPIRATION RATE: 14 BRPM | HEART RATE: 80 BPM | SYSTOLIC BLOOD PRESSURE: 128 MMHG | HEIGHT: 67 IN | OXYGEN SATURATION: 96 % | TEMPERATURE: 98 F | DIASTOLIC BLOOD PRESSURE: 78 MMHG

## 2020-05-27 DIAGNOSIS — M25.562 CHRONIC PAIN OF LEFT KNEE: ICD-10-CM

## 2020-05-27 DIAGNOSIS — E66.812 CLASS 2 SEVERE OBESITY DUE TO EXCESS CALORIES WITH SERIOUS COMORBIDITY AND BODY MASS INDEX (BMI) OF 38.0 TO 38.9 IN ADULT (H): ICD-10-CM

## 2020-05-27 DIAGNOSIS — E78.2 MIXED HYPERLIPIDEMIA: ICD-10-CM

## 2020-05-27 DIAGNOSIS — G89.29 CHRONIC PAIN OF LEFT KNEE: ICD-10-CM

## 2020-05-27 DIAGNOSIS — J98.6 DIAPHRAGM PARALYSIS: ICD-10-CM

## 2020-05-27 DIAGNOSIS — E66.01 CLASS 2 SEVERE OBESITY DUE TO EXCESS CALORIES WITH SERIOUS COMORBIDITY AND BODY MASS INDEX (BMI) OF 38.0 TO 38.9 IN ADULT (H): ICD-10-CM

## 2020-05-27 DIAGNOSIS — Z01.818 PREOP GENERAL PHYSICAL EXAM: Primary | ICD-10-CM

## 2020-05-27 DIAGNOSIS — Z12.5 SCREENING FOR PROSTATE CANCER: ICD-10-CM

## 2020-05-27 LAB — HBA1C MFR BLD: 8.9 % (ref 0–5.6)

## 2020-05-27 PROCEDURE — G0103 PSA SCREENING: HCPCS | Performed by: INTERNAL MEDICINE

## 2020-05-27 PROCEDURE — 83036 HEMOGLOBIN GLYCOSYLATED A1C: CPT | Performed by: INTERNAL MEDICINE

## 2020-05-27 PROCEDURE — 36415 COLL VENOUS BLD VENIPUNCTURE: CPT | Performed by: INTERNAL MEDICINE

## 2020-05-27 PROCEDURE — 99215 OFFICE O/P EST HI 40 MIN: CPT | Performed by: INTERNAL MEDICINE

## 2020-05-27 PROCEDURE — 80053 COMPREHEN METABOLIC PANEL: CPT | Performed by: INTERNAL MEDICINE

## 2020-05-27 ASSESSMENT — MIFFLIN-ST. JEOR: SCORE: 1903.09

## 2020-05-27 NOTE — PATIENT INSTRUCTIONS
I will let you know your lab results.                              You are considering whether to add medication for diabetes and cholesterol management.                    You are planning to reschedule your colonoscopy.              You are considering going on a fast on 2 days per week.           This means liquids only, and no sugary beverages.

## 2020-05-27 NOTE — LETTER
May 28, 2020      Donis Barraza  [address] [facility] [address]        Dear ,    We are writing to inform you of your test results.            As we discussed in the office, you have very poorly controlled diabetes.          I strongly recommend that you resume metformin therapy.                You should also resume your cholesterol-lowering medication to decrease your risk of heart attack and stroke.                  I will enclose prescriptions for these medications with this letter.         We should recheck your labs in a few months.       Results for orders placed or performed in visit on 05/27/20   Comprehensive metabolic panel (BMP + Alb, Alk Phos, ALT, AST, Total. Bili, TP)     Status: Abnormal   Result Value Ref Range    Sodium 136 133 - 144 mmol/L    Potassium 3.9 3.4 - 5.3 mmol/L    Chloride 103 94 - 109 mmol/L    Carbon Dioxide 26 20 - 32 mmol/L    Anion Gap 7 3 - 14 mmol/L    Glucose 294 (H) 70 - 99 mg/dL    Urea Nitrogen 15 7 - 30 mg/dL    Creatinine 0.87 0.66 - 1.25 mg/dL    GFR Estimate >90 >60 mL/min/[1.73_m2]    GFR Estimate If Black >90 >60 mL/min/[1.73_m2]    Calcium 8.6 8.5 - 10.1 mg/dL    Bilirubin Total 0.5 0.2 - 1.3 mg/dL    Albumin 3.8 3.4 - 5.0 g/dL    Protein Total 7.4 6.8 - 8.8 g/dL    Alkaline Phosphatase 85 40 - 150 U/L    ALT 38 0 - 70 U/L    AST 21 0 - 45 U/L   Hemoglobin A1c     Status: Abnormal   Result Value Ref Range    Hemoglobin A1C 8.9 (H) 0 - 5.6 %   PSA, screen     Status: None   Result Value Ref Range    PSA 0.37 0 - 4 ug/L         If you have any questions or concerns, please call the clinic at the number listed above.       Sincerely,        Bradley Blackman MD

## 2020-05-27 NOTE — PROGRESS NOTES
[facility]  [address]  [facility] [address]  [phone]  Dept: [phone]    PRE-OP EVALUATION:  Today's date: 2020    Donis Barraza (: 1960) presents for pre-operative evaluation assessment as requested by Dr. Wood.  He requires evaluation and anesthesia risk assessment prior to undergoing surgery/procedure for treatment of lt knee .    Proposed Surgery/ Procedure: RT knee arthroscopy  Date of Surgery/ Procedure: TBD  Time of Surgery/ Procedure: TBD  Hospital/Surgical Facility: [facility] in [place] or [facility]  Fax number for surgical facility:   Primary Physician: Bere Middleton  Type of Anesthesia Anticipated: General    Patient has a Health Care Directive or Living Will:  YES    1. NO - Do you have a history of heart attack, stroke, stent, bypass or surgery on an artery in the head, neck, heart or legs?  2. NO - Do you ever have any pain or discomfort in your chest?  3. NO - Do you have a history of  Heart Failure?  4. NO - Are you troubled by shortness of breath when: walking on the level, up a slight hill or at night?  5. NO - Do you currently have a cold, bronchitis or other respiratory infection?  6. NO - Do you have a cough, shortness of breath or wheezing?  7. NO - Do you sometimes get pains in the calves of your legs when you walk?  8. NO - Do you or anyone in your family have previous history of blood clots?  9. NO - Do you or does anyone in your family have a serious bleeding problem such as prolonged bleeding following surgeries or cuts?  10. NO - Have you ever had problems with anemia or been told to take iron pills?  11. NO - Have you had any abnormal blood loss such as black, tarry or bloody stools, or abnormal vaginal bleeding?  12. NO - Have you ever had a blood transfusion?  13. NO - Have you or any of your relatives ever had problems with anesthesia?  14. NO - Do you have sleep apnea, excessive  snoring or daytime drowsiness?  15. NO - Do you have any prosthetic heart valves?  16. NO - Do you have prosthetic joints?        HPI:     HPI related to upcoming procedure: L knee injury 7/19; fell at home.                                 Ongoing medical issues.               He stopped taking metformin and simvastatin; he takes no meds.        He does not want to take any medications.                His weight fluctuates.            He had a colonoscopy scheduled, but it was cancelled due to COVID 19.                                 He has chronic hemidiaphragmatic paralysis of uncertain etiology.  He has never been a smoker, but has been noted to be hypoxemic at times.           He states he had a sleep study in the past which did not show NOEMI.                                                              MEDICAL HISTORY:     Patient Active Problem List    Diagnosis Date Noted     Class 2 severe obesity due to excess calories with serious comorbidity and body mass index (BMI) of 38.0 to 38.9 in adult (H) 02/02/2019     Priority: High     Uncontrolled diabetes mellitus type 2 without complications (H) 02/02/2019     Priority: High     Diaphragm paralysis 09/04/2013     Priority: High     Chronic; unknown etiology       Screening for diabetic peripheral neuropathy 01/09/2020     Priority: Medium     Traumatic incomplete tear of right rotator cuff, subsequent encounter 01/09/2020     Priority: Medium     Claustrophobia 01/09/2020     Priority: Medium     Hx of repair of left rotator cuff 01/09/2020     Priority: Medium     Mixed simple and mucopurulent chronic bronchitis (H): spirometry  1-20  FVC&FEV1 = 1% 01/09/2020     Priority: Medium     Hypoxia since 2013 02/12/2019     Priority: Medium     States he had a sleep study in the past; states does not have CPAP.        Fatty liver per 5-13 CT  02/12/2019     Priority: Medium     History of colonic polyps one per 2-08 w fam hx same 02/12/2019     Priority: Medium      Abnormal CT scan of lung 5-13 lingular nodule  02/12/2019     Priority: Medium     Bronchitis subacute onset 11-18 02/12/2019     Priority: Medium     Mixed hyperlipidemia 02/02/2019     Priority: Medium     Nonsmoker 02/02/2019     Priority: Medium     SOB (shortness of breath) 05/20/2013     Priority: Medium     Anal fissure 01/27/2003     Priority: Medium     dr schaffer        Past Medical History:   Diagnosis Date     Arthritis      Diabetes (H)      h/o Rheumatic fever      Hemidiaphragm paralysis - right      Obese      Past Surgical History:   Procedure Laterality Date     ARTHROTOMY SHOULDER, ROTATOR CUFF REPAIR, COMBINED Right 2/10/2020    Procedure: Rotator cuff repair, right shoulder;  Surgeon: Giovani Wood MD;  Location: [facility] OR     C APPENDECTOMY  1980's     SHOULDER SURGERY Left 2007    s/p trauma     TONSILLECTOMY, ADENOIDECTOMY, COMBINED       Current Outpatient Medications   Medication Sig Dispense Refill     NO ACTIVE MEDICATIONS        OTC products: None, except as noted above    Allergies   Allergen Reactions     No Known Drug Allergies       Latex Allergy: NO    Social History     Tobacco Use     Smoking status: Never Smoker     Smokeless tobacco: Former User     Tobacco comment: passive user   Substance Use Topics     Alcohol use: Yes     Alcohol/week: 6.7 standard drinks     Comment: very little     History   Drug Use No       REVIEW OF SYSTEMS:   CONSTITUTIONAL:NEGATIVE  for chills and fever  and his weight fluctuates; he admits that he eats too much in the evenings  INTEGUMENTARY/SKIN: NEGATIVE for worrisome rashes, moles or lesions  EYES: NEGATIVE for vision changes or irritation  ENT/MOUTH: NEGATIVE for ear, mouth and throat problems  RESP:NEGATIVE for cough-productive, hemoptysis and wheezing  CV: NEGATIVE for chest pain/chest pressure and palpitations  GI: Occasional lower abdominal discomfort is noted and NEGATIVE for hematochezia and melena  : NEGATIVE for frequency, dysuria,  "or hematuria  NEURO: NEGATIVE for weakness, dizziness or paresthesias  ENDOCRINE: NEGATIVE for temperature intolerance, skin/hair changes  HEME: NEGATIVE for bleeding problems  PSYCHIATRIC: NEGATIVE for changes in mood or affect    EXAM:   /78 (Cuff Size: Adult Large)   Pulse 80   Temp 98  F (36.7  C) (Tympanic)   Resp 14   Ht 1.702 m (5' 7\")   Wt 112.9 kg (249 lb)   SpO2 96%   BMI 39.00 kg/m      GENERAL APPEARANCE: alert and over weight     HENT: nose and mouth without ulcers or lesions     NECK: no adenopathy, no asymmetry, masses, or scars and thyroid normal to palpation     RESP: decreased breath sounds right base otherwise clear     CV: regular rates and rhythm, normal S1 S2, no S3 or S4 and no murmur, click or rub     ABDOMEN: soft, nontender, without hepatosplenomegaly or masses     NEURO: mentation intact and speech normal     PSYCH: mentation appears normal. and affect normal/bright     LYMPHATICS: No cervical adenopathy    DIAGNOSTICS:     EKG: appears normal, NSR, dated 1/9/2020  Labs Resulted Today:   Results for orders placed or performed in visit on 05/27/20   Comprehensive metabolic panel (BMP + Alb, Alk Phos, ALT, AST, Total. Bili, TP)     Status: Abnormal   Result Value Ref Range    Sodium 136 133 - 144 mmol/L    Potassium 3.9 3.4 - 5.3 mmol/L    Chloride 103 94 - 109 mmol/L    Carbon Dioxide 26 20 - 32 mmol/L    Anion Gap 7 3 - 14 mmol/L    Glucose 294 (H) 70 - 99 mg/dL    Urea Nitrogen 15 7 - 30 mg/dL    Creatinine 0.87 0.66 - 1.25 mg/dL    GFR Estimate >90 >60 mL/min/[1.73_m2]    GFR Estimate If Black >90 >60 mL/min/[1.73_m2]    Calcium 8.6 8.5 - 10.1 mg/dL    Bilirubin Total 0.5 0.2 - 1.3 mg/dL    Albumin 3.8 3.4 - 5.0 g/dL    Protein Total 7.4 6.8 - 8.8 g/dL    Alkaline Phosphatase 85 40 - 150 U/L    ALT 38 0 - 70 U/L    AST 21 0 - 45 U/L   Hemoglobin A1c     Status: Abnormal   Result Value Ref Range    Hemoglobin A1C 8.9 (H) 0 - 5.6 %   PSA, screen     Status: None   Result Value " Ref Range    PSA 0.37 0 - 4 ug/L     CBC RESULTS:   Recent Labs   Lab Test 01/09/20  1009   WBC 6.6   RBC 5.42   HGB 15.9   HCT 47.2   MCV 87   MCH 29.3   MCHC 33.7   RDW 12.4              Chest x-ray: Elevated right hemidiaphragm unchanged.  No acute abnormalities.  Dated 1/9/2020.  IMPRESSION:   Reason for surgery/procedure: Need for knee surgery  Diagnosis/reason for consult: Need for preop evaluation    The proposed surgical procedure is considered LOW risk.    REVISED CARDIAC RISK INDEX  The patient has the following serious cardiovascular risks for perioperative complications such as (MI, PE, VFib and 3  AV Block):  No serious cardiac risks  INTERPRETATION: 0 risks: Class I (very low risk - 0.4% complication rate)    The patient has the following additional risks for perioperative complications:  Morbid obesity  Uncontrolled diabetes, with patient very reluctant to accept pharmacologic therapy, or even to accept the diagnosis of diabetes.  Chronically elevated right hemidiaphragm with history of dyspnea on exertion, and some history of hypoxemia though not not hypoxemic today.    ICD-10-CM    1. Preop general physical exam  Z01.818 Comprehensive metabolic panel (BMP + Alb, Alk Phos, ALT, AST, Total. Bili, TP)   2. Chronic pain of left knee  M25.562     G89.29    3. Uncontrolled diabetes mellitus type 2 without complications (H)  E11.65    4. Class 2 severe obesity due to excess calories with serious comorbidity and body mass index (BMI) of 38.0 to 38.9 in adult (H)  E66.01     Z68.38    5. Diaphragm paralysis  J98.6    6. Mixed hyperlipidemia-uncontrolled  E78.2    7. Screening for prostate cancer  Z12.5 PSA, screen       RECOMMENDATIONS:     Discussion: Consider post procedure anticoagulation for this morbidly obese patient.                                I spent much time with him discussing his uncontrolled diabetes, and need for pharmacologic therapy.  He will consider my recommendations.        Patient Instructions      I will let you know your lab results.                              You are considering whether to add medication for diabetes and cholesterol management.                    You are planning to reschedule your colonoscopy.              You are considering going on a fast on 2 days per week.           This means liquids only, and no sugary beverages.                                 APPROVAL GIVEN to proceed with proposed procedure, without further diagnostic evaluation.          He has been snacking this afternoon; not fasting.           Signed Electronically by: Bradley Blackman MD    Copy of this evaluation report is provided to requesting physician.            Letter sent:      As we discussed in the office, you have very poorly controlled diabetes.          I strongly recommend that you resume metformin therapy.                You should also resume your cholesterol-lowering medication to decrease your risk of heart attack and stroke.                  I will enclose prescriptions for these medications with this letter.         We should recheck your labs in a few months.

## 2020-05-28 LAB
ALBUMIN SERPL-MCNC: 3.8 G/DL (ref 3.4–5)
ALP SERPL-CCNC: 85 U/L (ref 40–150)
ALT SERPL W P-5'-P-CCNC: 38 U/L (ref 0–70)
ANION GAP SERPL CALCULATED.3IONS-SCNC: 7 MMOL/L (ref 3–14)
AST SERPL W P-5'-P-CCNC: 21 U/L (ref 0–45)
BILIRUB SERPL-MCNC: 0.5 MG/DL (ref 0.2–1.3)
BUN SERPL-MCNC: 15 MG/DL (ref 7–30)
CALCIUM SERPL-MCNC: 8.6 MG/DL (ref 8.5–10.1)
CHLORIDE SERPL-SCNC: 103 MMOL/L (ref 94–109)
CO2 SERPL-SCNC: 26 MMOL/L (ref 20–32)
CREAT SERPL-MCNC: 0.87 MG/DL (ref 0.66–1.25)
GFR SERPL CREATININE-BSD FRML MDRD: >90 ML/MIN/{1.73_M2}
GLUCOSE SERPL-MCNC: 294 MG/DL (ref 70–99)
POTASSIUM SERPL-SCNC: 3.9 MMOL/L (ref 3.4–5.3)
PROT SERPL-MCNC: 7.4 G/DL (ref 6.8–8.8)
PSA SERPL-ACNC: 0.37 UG/L (ref 0–4)
SODIUM SERPL-SCNC: 136 MMOL/L (ref 133–144)

## 2020-05-28 RX ORDER — METFORMIN HYDROCHLORIDE 750 MG/1
1500 TABLET, EXTENDED RELEASE ORAL
Qty: 180 TABLET | Refills: 3 | Status: SHIPPED | OUTPATIENT
Start: 2020-05-28 | End: 2021-06-26

## 2020-05-28 RX ORDER — ROSUVASTATIN CALCIUM 20 MG/1
20 TABLET, COATED ORAL DAILY
Qty: 90 TABLET | Refills: 3 | Status: SHIPPED | OUTPATIENT
Start: 2020-05-28 | End: 2021-03-20

## 2020-06-15 DIAGNOSIS — Z11.59 ENCOUNTER FOR SCREENING FOR OTHER VIRAL DISEASES: Primary | ICD-10-CM

## 2020-06-19 DIAGNOSIS — Z11.59 ENCOUNTER FOR SCREENING FOR OTHER VIRAL DISEASES: ICD-10-CM

## 2020-06-19 PROCEDURE — 99207 ZZC NO BILLABLE SERVICE THIS VISIT: CPT

## 2020-06-19 PROCEDURE — U0003 INFECTIOUS AGENT DETECTION BY NUCLEIC ACID (DNA OR RNA); SEVERE ACUTE RESPIRATORY SYNDROME CORONAVIRUS 2 (SARS-COV-2) (CORONAVIRUS DISEASE [COVID-19]), AMPLIFIED PROBE TECHNIQUE, MAKING USE OF HIGH THROUGHPUT TECHNOLOGIES AS DESCRIBED BY CMS-2020-01-R: HCPCS | Mod: 90 | Performed by: ORTHOPAEDIC SURGERY

## 2020-06-19 PROCEDURE — 99000 SPECIMEN HANDLING OFFICE-LAB: CPT | Performed by: ORTHOPAEDIC SURGERY

## 2020-06-19 NOTE — LETTER
June 20, 2020        Donis Barraza  [address] [facility] [address]    This letter provides a written record that you were tested for COVID-19 on ***.       Your result was negative. This means that we didn t find the virus that causes COVID-19 in your sample. A test may show negative when you do actually have the virus. This can happen when the virus is in the early stages of infection, before you feel illness symptoms.    If you have symptoms   Stay home and away from others (self-isolate) until you meet ALL of the guidelines below:    You ve had no fever--and no medicine that reduces fever--for 3 full days (72 hours). And      Your other symptoms have gotten better. For example, your cough or breathing has improved. And     At least 10 days have passed since your symptoms started.    During this time:    Stay home. Don t go to work, school or anywhere else.     Stay in your own room, including for meals. Use your own bathroom if you can.    Stay away from others in your home. No hugging, kissing or shaking hands. No visitors.    Clean  high touch  surfaces often (doorknobs, counters, handles, etc.). Use a household cleaning spray or wipes. You can find a full list on the EPA website at www.epa.gov/pesticide-registration/list-n-disinfectants-use-against-sars-cov-2.    Cover your mouth and nose with a mask, tissue or washcloth to avoid spreading germs.    Wash your hands and face often with soap and water.    Going back to work  Check with your employer for any guidelines to follow for going back to work.    Employers: This document serves as formal notice that your employee tested negative for COVID-19, as of the testing date shown above.

## 2020-06-20 LAB
SARS-COV-2 RNA SPEC QL NAA+PROBE: NOT DETECTED
SPECIMEN SOURCE: NORMAL

## 2020-06-22 ENCOUNTER — ANESTHESIA EVENT (OUTPATIENT)
Dept: SURGERY | Facility: CLINIC | Age: 60
End: 2020-06-22
Payer: COMMERCIAL

## 2020-06-22 ENCOUNTER — ANESTHESIA (OUTPATIENT)
Dept: SURGERY | Facility: CLINIC | Age: 60
End: 2020-06-22
Payer: COMMERCIAL

## 2020-06-22 ENCOUNTER — HOSPITAL ENCOUNTER (OUTPATIENT)
Facility: CLINIC | Age: 60
Discharge: HOME | End: 2020-06-22
Attending: ORTHOPAEDIC SURGERY | Admitting: ORTHOPAEDIC SURGERY
Payer: COMMERCIAL

## 2020-06-22 VITALS
HEART RATE: 80 BPM | BODY MASS INDEX: 38.92 KG/M2 | TEMPERATURE: 97.7 F | DIASTOLIC BLOOD PRESSURE: 97 MMHG | RESPIRATION RATE: 18 BRPM | SYSTOLIC BLOOD PRESSURE: 132 MMHG | OXYGEN SATURATION: 94 % | HEIGHT: 67 IN | WEIGHT: 248 LBS

## 2020-06-22 DIAGNOSIS — M25.562 ACUTE PAIN OF LEFT KNEE: Primary | ICD-10-CM

## 2020-06-22 LAB
GLUCOSE BLDC GLUCOMTR-MCNC: 195 MG/DL (ref 70–99)
GLUCOSE BLDC GLUCOMTR-MCNC: 203 MG/DL (ref 70–99)
GLUCOSE BLDC GLUCOMTR-MCNC: 241 MG/DL (ref 70–99)

## 2020-06-22 PROCEDURE — 25000128 H RX IP 250 OP 636: Performed by: ANESTHESIOLOGY

## 2020-06-22 PROCEDURE — 25000128 H RX IP 250 OP 636: Performed by: NURSE ANESTHETIST, CERTIFIED REGISTERED

## 2020-06-22 PROCEDURE — 40000306 ZZH STATISTIC PRE PROC ASSESS II: Performed by: ORTHOPAEDIC SURGERY

## 2020-06-22 PROCEDURE — 37000009 ZZH ANESTHESIA TECHNICAL FEE, EACH ADDTL 15 MIN: Performed by: ORTHOPAEDIC SURGERY

## 2020-06-22 PROCEDURE — 71000027 ZZH RECOVERY PHASE 2 EACH 15 MINS: Performed by: ORTHOPAEDIC SURGERY

## 2020-06-22 PROCEDURE — 25000132 ZZH RX MED GY IP 250 OP 250 PS 637: Performed by: NURSE ANESTHETIST, CERTIFIED REGISTERED

## 2020-06-22 PROCEDURE — 36000056 ZZH SURGERY LEVEL 3 1ST 30 MIN: Performed by: ORTHOPAEDIC SURGERY

## 2020-06-22 PROCEDURE — 71000012 ZZH RECOVERY PHASE 1 LEVEL 1 FIRST HR: Performed by: ORTHOPAEDIC SURGERY

## 2020-06-22 PROCEDURE — 71000013 ZZH RECOVERY PHASE 1 LEVEL 1 EA ADDTL HR: Performed by: ORTHOPAEDIC SURGERY

## 2020-06-22 PROCEDURE — 27210794 ZZH OR GENERAL SUPPLY STERILE: Performed by: ORTHOPAEDIC SURGERY

## 2020-06-22 PROCEDURE — 25000132 ZZH RX MED GY IP 250 OP 250 PS 637: Performed by: ANESTHESIOLOGY

## 2020-06-22 PROCEDURE — 37000008 ZZH ANESTHESIA TECHNICAL FEE, 1ST 30 MIN: Performed by: ORTHOPAEDIC SURGERY

## 2020-06-22 PROCEDURE — 25800025 ZZH RX 258: Performed by: ORTHOPAEDIC SURGERY

## 2020-06-22 PROCEDURE — 25000125 ZZHC RX 250: Performed by: ANESTHESIOLOGY

## 2020-06-22 PROCEDURE — 36000058 ZZH SURGERY LEVEL 3 EA 15 ADDTL MIN: Performed by: ORTHOPAEDIC SURGERY

## 2020-06-22 PROCEDURE — 25000128 H RX IP 250 OP 636: Performed by: PHYSICIAN ASSISTANT

## 2020-06-22 PROCEDURE — 25800030 ZZH RX IP 258 OP 636: Performed by: ANESTHESIOLOGY

## 2020-06-22 PROCEDURE — 25000125 ZZHC RX 250: Performed by: NURSE ANESTHETIST, CERTIFIED REGISTERED

## 2020-06-22 PROCEDURE — 25000128 H RX IP 250 OP 636: Performed by: ORTHOPAEDIC SURGERY

## 2020-06-22 PROCEDURE — 82962 GLUCOSE BLOOD TEST: CPT

## 2020-06-22 RX ORDER — IBUPROFEN 600 MG/1
600 TABLET, FILM COATED ORAL
Status: DISCONTINUED | OUTPATIENT
Start: 2020-06-22 | End: 2020-06-22 | Stop reason: HOSPADM

## 2020-06-22 RX ORDER — SODIUM CHLORIDE, SODIUM LACTATE, POTASSIUM CHLORIDE, CALCIUM CHLORIDE 600; 310; 30; 20 MG/100ML; MG/100ML; MG/100ML; MG/100ML
INJECTION, SOLUTION INTRAVENOUS CONTINUOUS
Status: DISCONTINUED | OUTPATIENT
Start: 2020-06-22 | End: 2020-06-22 | Stop reason: HOSPADM

## 2020-06-22 RX ORDER — HYDROMORPHONE HYDROCHLORIDE 1 MG/ML
.3-.5 INJECTION, SOLUTION INTRAMUSCULAR; INTRAVENOUS; SUBCUTANEOUS EVERY 10 MIN PRN
Status: DISCONTINUED | OUTPATIENT
Start: 2020-06-22 | End: 2020-06-22 | Stop reason: HOSPADM

## 2020-06-22 RX ORDER — MEPERIDINE HYDROCHLORIDE 50 MG/ML
12.5 INJECTION INTRAMUSCULAR; INTRAVENOUS; SUBCUTANEOUS
Status: DISCONTINUED | OUTPATIENT
Start: 2020-06-22 | End: 2020-06-22 | Stop reason: HOSPADM

## 2020-06-22 RX ORDER — ONDANSETRON 4 MG/1
4 TABLET, ORALLY DISINTEGRATING ORAL EVERY 30 MIN PRN
Status: DISCONTINUED | OUTPATIENT
Start: 2020-06-22 | End: 2020-06-22 | Stop reason: HOSPADM

## 2020-06-22 RX ORDER — FENTANYL CITRATE 50 UG/ML
25-50 INJECTION, SOLUTION INTRAMUSCULAR; INTRAVENOUS EVERY 5 MIN PRN
Status: DISCONTINUED | OUTPATIENT
Start: 2020-06-22 | End: 2020-06-22 | Stop reason: HOSPADM

## 2020-06-22 RX ORDER — HYDRALAZINE HYDROCHLORIDE 20 MG/ML
2.5-5 INJECTION INTRAMUSCULAR; INTRAVENOUS EVERY 10 MIN PRN
Status: DISCONTINUED | OUTPATIENT
Start: 2020-06-22 | End: 2020-06-22 | Stop reason: HOSPADM

## 2020-06-22 RX ORDER — FENTANYL CITRATE 50 UG/ML
25-50 INJECTION, SOLUTION INTRAMUSCULAR; INTRAVENOUS
Status: DISCONTINUED | OUTPATIENT
Start: 2020-06-22 | End: 2020-06-22 | Stop reason: HOSPADM

## 2020-06-22 RX ORDER — LIDOCAINE 40 MG/G
CREAM TOPICAL
Status: DISCONTINUED | OUTPATIENT
Start: 2020-06-22 | End: 2020-06-22 | Stop reason: HOSPADM

## 2020-06-22 RX ORDER — FENTANYL CITRATE 50 UG/ML
INJECTION, SOLUTION INTRAMUSCULAR; INTRAVENOUS PRN
Status: DISCONTINUED | OUTPATIENT
Start: 2020-06-22 | End: 2020-06-22

## 2020-06-22 RX ORDER — ONDANSETRON 2 MG/ML
INJECTION INTRAMUSCULAR; INTRAVENOUS PRN
Status: DISCONTINUED | OUTPATIENT
Start: 2020-06-22 | End: 2020-06-22

## 2020-06-22 RX ORDER — DEXAMETHASONE SODIUM PHOSPHATE 4 MG/ML
INJECTION, SOLUTION INTRA-ARTICULAR; INTRALESIONAL; INTRAMUSCULAR; INTRAVENOUS; SOFT TISSUE PRN
Status: DISCONTINUED | OUTPATIENT
Start: 2020-06-22 | End: 2020-06-22

## 2020-06-22 RX ORDER — OXYCODONE HYDROCHLORIDE 5 MG/1
5-10 TABLET ORAL EVERY 4 HOURS PRN
Qty: 10 TABLET | Refills: 0 | Status: SHIPPED | OUTPATIENT
Start: 2020-06-22 | End: 2021-03-20

## 2020-06-22 RX ORDER — BUPIVACAINE HYDROCHLORIDE 2.5 MG/ML
INJECTION, SOLUTION EPIDURAL; INFILTRATION; INTRACAUDAL PRN
Status: DISCONTINUED | OUTPATIENT
Start: 2020-06-22 | End: 2020-06-22 | Stop reason: HOSPADM

## 2020-06-22 RX ORDER — OXYCODONE HYDROCHLORIDE 5 MG/1
5 TABLET ORAL EVERY 4 HOURS PRN
Status: DISCONTINUED | OUTPATIENT
Start: 2020-06-22 | End: 2020-06-22 | Stop reason: HOSPADM

## 2020-06-22 RX ORDER — PROPOFOL 10 MG/ML
INJECTION, EMULSION INTRAVENOUS PRN
Status: DISCONTINUED | OUTPATIENT
Start: 2020-06-22 | End: 2020-06-22

## 2020-06-22 RX ORDER — KETAMINE HYDROCHLORIDE 10 MG/ML
INJECTION INTRAMUSCULAR; INTRAVENOUS PRN
Status: DISCONTINUED | OUTPATIENT
Start: 2020-06-22 | End: 2020-06-22

## 2020-06-22 RX ORDER — CEFAZOLIN SODIUM 2 G/100ML
2 INJECTION, SOLUTION INTRAVENOUS
Status: COMPLETED | OUTPATIENT
Start: 2020-06-22 | End: 2020-06-22

## 2020-06-22 RX ORDER — CEFAZOLIN SODIUM 1 G/3ML
1 INJECTION, POWDER, FOR SOLUTION INTRAMUSCULAR; INTRAVENOUS SEE ADMIN INSTRUCTIONS
Status: DISCONTINUED | OUTPATIENT
Start: 2020-06-22 | End: 2020-06-22 | Stop reason: HOSPADM

## 2020-06-22 RX ORDER — ALBUTEROL SULFATE 0.83 MG/ML
2.5 SOLUTION RESPIRATORY (INHALATION) EVERY 4 HOURS PRN
Status: DISCONTINUED | OUTPATIENT
Start: 2020-06-22 | End: 2020-06-22 | Stop reason: HOSPADM

## 2020-06-22 RX ORDER — ALBUTEROL SULFATE 90 UG/1
AEROSOL, METERED RESPIRATORY (INHALATION) PRN
Status: DISCONTINUED | OUTPATIENT
Start: 2020-06-22 | End: 2020-06-22

## 2020-06-22 RX ORDER — ONDANSETRON 2 MG/ML
4 INJECTION INTRAMUSCULAR; INTRAVENOUS EVERY 30 MIN PRN
Status: DISCONTINUED | OUTPATIENT
Start: 2020-06-22 | End: 2020-06-22 | Stop reason: HOSPADM

## 2020-06-22 RX ORDER — METOPROLOL TARTRATE 1 MG/ML
1-2 INJECTION, SOLUTION INTRAVENOUS EVERY 5 MIN PRN
Status: DISCONTINUED | OUTPATIENT
Start: 2020-06-22 | End: 2020-06-22 | Stop reason: HOSPADM

## 2020-06-22 RX ORDER — NALOXONE HYDROCHLORIDE 0.4 MG/ML
.1-.4 INJECTION, SOLUTION INTRAMUSCULAR; INTRAVENOUS; SUBCUTANEOUS
Status: DISCONTINUED | OUTPATIENT
Start: 2020-06-22 | End: 2020-06-22 | Stop reason: HOSPADM

## 2020-06-22 RX ORDER — KETOROLAC TROMETHAMINE 30 MG/ML
30 INJECTION, SOLUTION INTRAMUSCULAR; INTRAVENOUS EVERY 6 HOURS PRN
Status: DISCONTINUED | OUTPATIENT
Start: 2020-06-22 | End: 2020-06-22 | Stop reason: HOSPADM

## 2020-06-22 RX ADMIN — CEFAZOLIN SODIUM 2 G: 2 INJECTION, SOLUTION INTRAVENOUS at 07:27

## 2020-06-22 RX ADMIN — ALBUTEROL SULFATE 2 PUFF: 90 AEROSOL, METERED RESPIRATORY (INHALATION) at 07:38

## 2020-06-22 RX ADMIN — SODIUM CHLORIDE 2 UNITS: 9 INJECTION, SOLUTION INTRAVENOUS at 10:38

## 2020-06-22 RX ADMIN — Medication 50 MG: at 07:31

## 2020-06-22 RX ADMIN — FENTANYL CITRATE 100 MCG: 50 INJECTION, SOLUTION INTRAMUSCULAR; INTRAVENOUS at 07:24

## 2020-06-22 RX ADMIN — FENTANYL CITRATE 50 MCG: 0.05 INJECTION, SOLUTION INTRAMUSCULAR; INTRAVENOUS at 09:05

## 2020-06-22 RX ADMIN — PROPOFOL 200 MG: 10 INJECTION, EMULSION INTRAVENOUS at 07:24

## 2020-06-22 RX ADMIN — FENTANYL CITRATE 50 MCG: 0.05 INJECTION, SOLUTION INTRAMUSCULAR; INTRAVENOUS at 08:44

## 2020-06-22 RX ADMIN — MIDAZOLAM 2 MG: 1 INJECTION INTRAMUSCULAR; INTRAVENOUS at 07:18

## 2020-06-22 RX ADMIN — ALBUTEROL SULFATE 2 PUFF: 90 AEROSOL, METERED RESPIRATORY (INHALATION) at 07:50

## 2020-06-22 RX ADMIN — SODIUM CHLORIDE 2 UNITS: 9 INJECTION, SOLUTION INTRAVENOUS at 09:07

## 2020-06-22 RX ADMIN — FENTANYL CITRATE 50 MCG: 50 INJECTION, SOLUTION INTRAMUSCULAR; INTRAVENOUS at 07:37

## 2020-06-22 RX ADMIN — KETOROLAC TROMETHAMINE 30 MG: 30 INJECTION, SOLUTION INTRAMUSCULAR at 09:00

## 2020-06-22 RX ADMIN — DEXAMETHASONE SODIUM PHOSPHATE 4 MG: 4 INJECTION, SOLUTION INTRA-ARTICULAR; INTRALESIONAL; INTRAMUSCULAR; INTRAVENOUS; SOFT TISSUE at 07:24

## 2020-06-22 RX ADMIN — ONDANSETRON HYDROCHLORIDE 4 MG: 2 INJECTION, SOLUTION INTRAVENOUS at 07:50

## 2020-06-22 RX ADMIN — LIDOCAINE HYDROCHLORIDE 50 MG: 10 INJECTION, SOLUTION EPIDURAL; INFILTRATION; INTRACAUDAL; PERINEURAL at 07:24

## 2020-06-22 RX ADMIN — SODIUM CHLORIDE, POTASSIUM CHLORIDE, SODIUM LACTATE AND CALCIUM CHLORIDE: 600; 310; 30; 20 INJECTION, SOLUTION INTRAVENOUS at 07:16

## 2020-06-22 ASSESSMENT — MIFFLIN-ST. JEOR: SCORE: 1898.55

## 2020-06-22 NOTE — ANESTHESIA POSTPROCEDURE EVALUATION
Patient: Donis Barraza    Procedure(s):  Left knee arthroscopy    Diagnosis:Medial meniscus tear [S83.249A]  Diagnosis Additional Information: No value filed.    Anesthesia Type:  General    Note:  Anesthesia Post Evaluation    Patient location during evaluation: PACU  Patient participation: Able to fully participate in evaluation  Level of consciousness: awake  Pain management: adequate  Airway patency: patent  Cardiovascular status: acceptable  Respiratory status: acceptable  Hydration status: acceptable  PONV: controlled     Anesthetic complications: None          Last vitals:  Vitals:    06/22/20 0915 06/22/20 0930 06/22/20 0949   BP: (!) 124/92 107/82 136/87   Pulse: 81 80    Resp: 10 27 18   Temp:   97.7  F (36.5  C)   SpO2: 97% 98% 92%         Electronically Signed By: Daryn Mera MD  June 22, 2020  9:59 AM

## 2020-06-22 NOTE — OP NOTE
Procedure Date: 2020      PREOPERATIVE DIAGNOSIS:  Displaced medial meniscus tear, left knee.      POSTOPERATIVE DIAGNOSIS:  Displaced medial meniscus tear, left knee.      PROCEDURE PERFORMED:  Left knee arthroscopic partial medial meniscectomy.      SURGEON:  Shahnaz Sprague MD      ASSISTANT:  Sonia Tran PA-C      ANESTHESIA:  General with local.      ESTIMATED BLOOD LOSS: 1 mL.      TOURNIQUET TIME:  Approximately 20 minutes.      COMPLICATIONS:  None.      DESCRIPTION OF PROCEDURE:  The patient was taken to the operating room where after administration of general anesthetic, antibiotic prophylaxis, and sterile prep and drape, the leg was exsanguinated and arthroscopy portals were established followed by standard arthroscopic evaluation.  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  The medial compartment showed broad grade 2 and 3 chondromalacia of the medial femoral condyle, but no exposed bone.  There was a displaced flap of posterior horn meniscus into the notch, which was debrided with a shaver to a stable margin.  After lavage, the instruments were removed.  An ArthroWand was used to cauterize small PCL bleeders.  The portals were closed followed by Marcaine and a compressive wrap.  There were no complications.         SHAHNAZ SPRAGUE MD             D: 2020   T: 2020   MT: SIRI      Name:     ROHIT ARMSTRONG   MRN:      [id number]        Account:        [id number]   :      1960           Procedure Date: 2020      Document: [id number]

## 2020-06-22 NOTE — ANESTHESIA CARE TRANSFER NOTE
Patient: Donis Barraza    Procedure(s):  Left knee arthroscopy    Diagnosis: Medial meniscus tear [S83.249A]  Diagnosis Additional Information: No value filed.    Anesthesia Type:   General     Note:  Airway :Face Mask  Patient transferred to:PACU  Comments: Pt transferred to PACU; VSS; Report to RN; Breath sounds clear. No further questions or concerns at this time. Handoff Report: Identifed the Patient, Identified the Reponsible Provider, Reviewed the pertinent medical history, Discussed the surgical course, Reviewed Intra-OP anesthesia mangement and issues during anesthesia, Set expectations for post-procedure period and Allowed opportunity for questions and acknowledgement of understanding      Vitals: (Last set prior to Anesthesia Care Transfer)    CRNA VITALS  6/22/2020 0738 - 6/22/2020 0817      6/22/2020             Pulse:  94    SpO2:  99 %    Resp Rate (observed):  9                Electronically Signed By: KEVIN Petersen CRNA  June 22, 2020  8:17 AM

## 2020-06-22 NOTE — ANESTHESIA PREPROCEDURE EVALUATION
Anesthesia Pre-Procedure Evaluation    Patient: Donis Barraza   MRN: [id number] : 1960          Preoperative Diagnosis: Medial meniscus tear [S83.249A]    Procedure(s):  Left knee arthroscopy    Past Medical History:   Diagnosis Date     Arthritis      Diabetes (H)      h/o Rheumatic fever      Hemidiaphragm paralysis - right      Obese      Past Surgical History:   Procedure Laterality Date     ARTHROTOMY SHOULDER, ROTATOR CUFF REPAIR, COMBINED Right 2/10/2020    Procedure: Rotator cuff repair, right shoulder;  Surgeon: Giovani Wood MD;  Location: [facility] OR     C APPENDECTOMY       SHOULDER SURGERY Left     s/p trauma     TONSILLECTOMY, ADENOIDECTOMY, COMBINED       Anesthesia Evaluation     . Pt has had prior anesthetic.            ROS/MED HX    ENT/Pulmonary:     (+), . Other pulmonary disease right hemidiaphragmatic paralysis.    Neurologic:       Cardiovascular:  - neg cardiovascular ROS       METS/Exercise Tolerance:     Hematologic:         Musculoskeletal:   (+) arthritis,  -       GI/Hepatic:        (-) GERD   Renal/Genitourinary:         Endo:     (+) type II DM .      Psychiatric:         Infectious Disease:         Malignancy:         Other:                          Physical Exam      Airway   Mallampati: II  TM distance: >3 FB  Neck ROM: full    Dental     Cardiovascular   Rhythm and rate: regular and normal      Pulmonary    breath sounds clear to auscultation            Lab Results   Component Value Date    WBC 6.6 2020    HGB 15.9 2020    HCT 47.2 2020     2020     2020    POTASSIUM 3.9 2020    CHLORIDE 103 2020    CO2 26 2020    BUN 15 2020    CR 0.87 2020     (H) 2020    GUS 8.6 2020    ALBUMIN 3.8 2020    PROTTOTAL 7.4 2020    ALT 38 2020    AST 21 2020    ALKPHOS 85 2020    BILITOTAL 0.5 2020    TSH 1.04 2020    T4 0.80 2008  "      Preop Vitals  BP Readings from Last 3 Encounters:   06/22/20 (!) 139/102   05/27/20 128/78   02/10/20 123/81    Pulse Readings from Last 3 Encounters:   06/22/20 82   05/27/20 80   02/10/20 68      Resp Readings from Last 3 Encounters:   06/22/20 16   05/27/20 14   02/10/20 18    SpO2 Readings from Last 3 Encounters:   06/22/20 98%   05/27/20 96%   02/10/20 97%      Temp Readings from Last 1 Encounters:   06/22/20 97.3  F (36.3  C) (Temporal)    Ht Readings from Last 1 Encounters:   06/22/20 1.702 m (5' 7\")      Wt Readings from Last 1 Encounters:   06/22/20 112.5 kg (248 lb)    Estimated body mass index is 38.84 kg/m  as calculated from the following:    Height as of this encounter: 1.702 m (5' 7\").    Weight as of this encounter: 112.5 kg (248 lb).       Anesthesia Plan      History & Physical Review  History and physical reviewed and following examination; no interval change.    ASA Status:  2 .    NPO Status:  > 8 hours    Plan for General with Intravenous and Propofol induction. Maintenance will be Balanced.    PONV prophylaxis:  Ondansetron (or other 5HT-3) and Dexamethasone or Solumedrol         Postoperative Care  Postoperative pain management:  IV analgesics, Oral pain medications and Multi-modal analgesia.      Consents  Anesthetic plan, risks, benefits and alternatives discussed with:  Patient..                 Daryn Mera MD                    .  "

## 2020-06-22 NOTE — DISCHARGE INSTRUCTIONS
GENERAL ANESTHESIA OR SEDATION ADULT DISCHARGE INSTRUCTIONS   SPECIAL PRECAUTIONS FOR 24 HOURS AFTER SURGERY    IT IS NOT UNUSUAL TO FEEL LIGHT-HEADED OR FAINT, UP TO 24 HOURS AFTER SURGERY OR WHILE TAKING PAIN MEDICATION.  IF YOU HAVE THESE SYMPTOMS; SIT FOR A FEW MINUTES BEFORE STANDING AND HAVE SOMEONE ASSIST YOU WHEN YOU GET UP TO WALK OR USE THE BATHROOM.    YOU SHOULD REST AND RELAX FOR THE NEXT 24 HOURS AND YOU MUST MAKE ARRANGEMENTS TO HAVE SOMEONE STAY WITH YOU FOR AT LEAST 24 HOURS AFTER YOUR DISCHARGE.  AVOID HAZARDOUS AND STRENUOUS ACTIVITIES.  DO NOT MAKE IMPORTANT DECISIONS FOR 24 HOURS.    DO NOT DRIVE ANY VEHICLE OR OPERATE MECHANICAL EQUIPMENT FOR 24 HOURS FOLLOWING THE END OF YOUR SURGERY.  EVEN THOUGH YOU MAY FEEL NORMAL, YOUR REACTIONS MAY BE AFFECTED BY THE MEDICATION YOU HAVE RECEIVED.    DO NOT DRINK ALCOHOLIC BEVERAGES FOR 24 HOURS FOLLOWING YOUR SURGERY.    DRINK CLEAR LIQUIDS (APPLE JUICE, GINGER ALE, 7-UP, BROTH, ETC.).  PROGRESS TO YOUR REGULAR DIET AS YOU FEEL ABLE.    YOU MAY HAVE A DRY MOUTH, A SORE THROAT, MUSCLES ACHES OR TROUBLE SLEEPING.  THESE SHOULD GO AWAY AFTER 24 HOURS.    CALL YOUR DOCTOR FOR ANY OF THE FOLLOWING:  SIGNS OF INFECTION (FEVER, GROWING TENDERNESS AT THE SURGERY SITE, A LARGE AMOUNT OF DRAINAGE OR BLEEDING, SEVERE PAIN, FOUL-SMELLING DRAINAGE, REDNESS OR SWELLING.    IT HAS BEEN OVER 8 TO 10 HOURS SINCE SURGERY AND YOU ARE STILL NOT ABLE TO URINATE (PASS WATER).         DR. SHAHNAZ SPRAGUE M.D.              CLINIC PHONE NUMBER:  [phone]  [facility]    You received Toradol, an IV form of ibuprofen (Motrin) at 9 am.  Do not take any ibuprofen products until 3 pm.

## 2021-03-20 ENCOUNTER — HOSPITAL ENCOUNTER (EMERGENCY)
Facility: CLINIC | Age: 61
Discharge: HOME | End: 2021-03-20
Attending: EMERGENCY MEDICINE | Admitting: EMERGENCY MEDICINE
Payer: COMMERCIAL

## 2021-03-20 ENCOUNTER — APPOINTMENT (OUTPATIENT)
Dept: GENERAL RADIOLOGY | Facility: CLINIC | Age: 61
End: 2021-03-20
Attending: EMERGENCY MEDICINE
Payer: COMMERCIAL

## 2021-03-20 VITALS
DIASTOLIC BLOOD PRESSURE: 93 MMHG | OXYGEN SATURATION: 97 % | RESPIRATION RATE: 20 BRPM | BODY MASS INDEX: 34.77 KG/M2 | SYSTOLIC BLOOD PRESSURE: 142 MMHG | TEMPERATURE: 97.5 F | HEART RATE: 92 BPM | WEIGHT: 222 LBS

## 2021-03-20 DIAGNOSIS — S86.111A GASTROCNEMIUS TEAR, RIGHT, INITIAL ENCOUNTER: ICD-10-CM

## 2021-03-20 PROCEDURE — 250N000013 HC RX MED GY IP 250 OP 250 PS 637: Performed by: EMERGENCY MEDICINE

## 2021-03-20 PROCEDURE — 99284 EMERGENCY DEPT VISIT MOD MDM: CPT

## 2021-03-20 PROCEDURE — 73590 X-RAY EXAM OF LOWER LEG: CPT | Mod: RT

## 2021-03-20 RX ORDER — IBUPROFEN 600 MG/1
600 TABLET, FILM COATED ORAL ONCE
Status: COMPLETED | OUTPATIENT
Start: 2021-03-20 | End: 2021-03-20

## 2021-03-20 RX ADMIN — IBUPROFEN 600 MG: 600 TABLET, FILM COATED ORAL at 12:00

## 2021-03-20 ASSESSMENT — ENCOUNTER SYMPTOMS: ARTHRALGIAS: 1

## 2021-03-20 NOTE — ED TRIAGE NOTES
Pt arrives to the ED due to right calf pain. Pt was getting himself up into the truck and was on his tip toes to pull himself up into the truck when he heard a snap and immediate pain in his right calf. Pt states his foot feels tingly and hot. Difficulty ambulating since.

## 2021-03-20 NOTE — ED PROVIDER NOTES
History   Chief Complaint:  Leg Pain     HPI   Donis Barraza is a 60 year old male with history of hypertension, hyperlipidemia, and diabetes mellitus who presents with right leg pain. The patient reports he was stepping into his tractor and was on his tip toes and pushed up and heard a snap and felt immediate pain in his right calf. He ranks the pain currently at a 7-8/10 He now feels his foot is tingly and hot. He has had difficulty ambulating since.     Review of Systems   Musculoskeletal: Positive for arthralgias (lower leg).         Allergies:  No Known Allergies    Medications:  Metformin    Past Medical History:    Arthritis  Diabetes mellitus   Hemidiaphragm paralysis (R)  Rheumatic fever  Hyperlipidemia     Past Surgical History:    Arthrotomy shoulder  Appendectomy   Tonsillectomy and adenoidectomy     Family History:    Mother- diabetes mellitus, valvular heart disease, atrial fibrillation, hypotension, cancer  Brother- polyps  Father- polyps, MI, atrial fibrillation, coronary artery disease     Social History:  Presents with his wife      Physical Exam     Patient Vitals for the past 24 hrs:   BP Temp Temp src Pulse Resp SpO2 Weight   03/20/21 1143 (!) 142/93 97.5  F (36.4  C) Temporal 92 20 97 % 100.7 kg (222 lb)     Physical Exam:  General- alert, cooperative  Pulm- normal respiratory effort, no respiratory distress  Msk-           RLE: 2+ pulses, sensation to light touch intact, no wounds or abrasions   ROM normal without difficulty, 5/5 strength. TTP mid calf without obvious swelling           LLE: 2+ pulses, sensation intact to light touch, no wounds or abrasions   ROM normal without difficulty, 5/5 strength   Fontana test negative.   Skin- no cyanosis or edema, no swelling, no rash or petechiae  Psych- normal mood and affect, normal behavio    Emergency Department Course     Imaging:  XR Tibia & Fibula Right 2 Views  No fracture or osseous lesion. The soft tissues are   unremarkable.  Degenerative changes in the ankle joint with small   posterior intra-articular body.   As read by Radiology.     Emergency Department Course:    Reviewed:  I reviewed nursing notes, vitals, past medical history and care everywhere    Assessments:  1158 I obtained history and examined the patient as noted above.   1308 I rechecked the patient and explained findings.     Interventions:  1200: Ibuprofen 600 mg PO      Disposition:  The patient was discharged to home.     Impression & Plan         Medical Decision Making:  Patient presents with sudden onset of pain in the right calf while climbing into a tractor. Patient on exam has an intact achilles tendon. There is no sign of rupture in the ahcilles area. He does have tenderness to palpation near the mid-calf. Might be a partial tear in his gastrocnemius muscle. X-ray does not show any involvements. He felt the pain was better after Ibuprofen. I gave him a walking boot and crutches to help with his injuries. He does seem Dr. Giovani Wood at [facility] so I will refer him there for further evaluation. He is comfortable with the plan. He is asked to limit weight bearing to as tolerated and return precautions are provided.     Diagnosis:    ICD-10-CM    1. Gastrocnemius tear, right, initial encounter  S86.111A            Scribe Disclosure:  I, Nini Pillai, am serving as a scribe at 11:49 AM on 3/20/2021 to document services personally performed by Nichelle Ernst MD based on my observations and the provider's statements to me.             Nichelle Ernst MD  03/20/21 1291

## 2021-06-14 ENCOUNTER — HOSPITAL LABORATORY (OUTPATIENT)
Dept: OTHER | Facility: CLINIC | Age: 61
End: 2021-06-14

## 2021-06-14 LAB
APPEARANCE FLD: NORMAL
COLOR FLD: NORMAL
CRYSTALS SNV MICRO: ABNORMAL
GRAM STN SPEC: NORMAL
GRAM STN SPEC: NORMAL
LYMPHOCYTES NFR FLD MANUAL: 2 %
MONOS+MACROS NFR FLD MANUAL: 18 %
NEUTS BAND NFR FLD MANUAL: 80 %
SPECIMEN SOURCE FLD: NORMAL
SPECIMEN SOURCE SNV: ABNORMAL
SPECIMEN SOURCE: NORMAL
WBC # FLD AUTO: 5460 /UL

## 2021-06-19 LAB
BACTERIA SPEC CULT: NO GROWTH
SPECIMEN SOURCE: NORMAL

## 2021-06-26 DIAGNOSIS — E11.9 TYPE 2 DIABETES MELLITUS WITHOUT COMPLICATION, WITHOUT LONG-TERM CURRENT USE OF INSULIN (H): Primary | ICD-10-CM

## 2021-06-26 RX ORDER — METFORMIN HYDROCHLORIDE 750 MG/1
TABLET, EXTENDED RELEASE ORAL
Qty: 180 TABLET | Refills: 0 | Status: SHIPPED | OUTPATIENT
Start: 2021-06-26 | End: 2021-11-23

## 2021-06-26 NOTE — LETTER
[facility] [place]  [address] [id number] [phone]            Donis [address] [address]        July 6, 2021    Dear Donis,    While refilling your prescription today, we noticed that you are due for an appointment with your provider.  We will refill your prescription for 90 days, but a follow-up appointment must be made before any additional refills can be approved.     Taking care of your health is important to us and we look forward to seeing you in the near future.  Please call us at [phone] or [phone] (or use [facility]) to schedule an appointment.     Please disregard this notice if you have already made an appointment.    Sincerely,        [facility] [place]

## 2021-06-27 NOTE — TELEPHONE ENCOUNTER
This patient is overdue for a visit with a new provider.  Please help this patient to schedule an office or virtual visit with a new provider.

## 2021-06-28 LAB
BACTERIA SPEC CULT: NORMAL
Lab: NORMAL
SPECIMEN SOURCE: NORMAL

## 2021-11-11 ENCOUNTER — NURSE TRIAGE (OUTPATIENT)
Dept: NURSING | Facility: CLINIC | Age: 61
End: 2021-11-11
Payer: COMMERCIAL

## 2021-11-11 NOTE — TELEPHONE ENCOUNTER
Wife calling, with patient.    paralyzed diaphragm.    Coughing.  No fever.  Congested.  Coughing up clear.    O2Sat 91-93%    ER for sob and covid sx    Liz Garcia RN  [facility] Nurse Advisor      Reason for Disposition    MODERATE difficulty breathing (e.g., speaks in phrases, SOB even at rest, pulse 100-120)    Additional Information    Negative: SEVERE difficulty breathing (e.g., struggling for each breath, speaks in single words)    Negative: Difficult to awaken or acting confused (e.g., disoriented, slurred speech)    Negative: Bluish (or gray) lips or face now    Negative: Shock suspected (e.g., cold/pale/clammy skin, too weak to stand, low BP, rapid pulse)    Negative: Sounds like a life-threatening emergency to the triager    Negative: [1] COVID-19 exposure AND [2] no symptoms    Negative: COVID-19 vaccine reaction suspected (e.g., fever, headache, muscle aches) occurring 1 to 3 days after getting vaccine    Negative: COVID-19 vaccine, questions about    Negative: [1] Lives with someone known to have influenza (flu test positive) AND [2] flu-like symptoms (e.g., cough, runny nose, sore throat, SOB; with or without fever)    Negative: [1] Adult with possible COVID-19 symptoms AND [2] triager concerned about severity of symptoms or other causes    Negative: COVID-19 and breastfeeding, questions about    Negative: SEVERE or constant chest pain or pressure (Exception: mild central chest pain, present only when coughing)    Protocols used: CORONAVIRUS (COVID-19) DIAGNOSED OR [id number] 8.25.2021

## 2021-11-23 ENCOUNTER — OFFICE VISIT (OUTPATIENT)
Dept: FAMILY MEDICINE | Facility: CLINIC | Age: 61
End: 2021-11-23

## 2021-11-23 VITALS
OXYGEN SATURATION: 95 % | HEART RATE: 84 BPM | SYSTOLIC BLOOD PRESSURE: 132 MMHG | DIASTOLIC BLOOD PRESSURE: 92 MMHG | RESPIRATION RATE: 20 BRPM | BODY MASS INDEX: 36.02 KG/M2 | TEMPERATURE: 97.9 F | WEIGHT: 230 LBS

## 2021-11-23 DIAGNOSIS — J41.8 MIXED SIMPLE AND MUCOPURULENT CHRONIC BRONCHITIS (H): ICD-10-CM

## 2021-11-23 DIAGNOSIS — G47.33 OSA (OBSTRUCTIVE SLEEP APNEA): ICD-10-CM

## 2021-11-23 DIAGNOSIS — E11.9 TYPE 2 DIABETES MELLITUS WITHOUT COMPLICATION, WITHOUT LONG-TERM CURRENT USE OF INSULIN (H): Primary | ICD-10-CM

## 2021-11-23 DIAGNOSIS — R03.0 ELEVATED BLOOD PRESSURE READING WITHOUT DIAGNOSIS OF HYPERTENSION: ICD-10-CM

## 2021-11-23 DIAGNOSIS — E78.2 MIXED HYPERLIPIDEMIA: ICD-10-CM

## 2021-11-23 DIAGNOSIS — J98.6 HEMIDIAPHRAGM PARALYSIS: ICD-10-CM

## 2021-11-23 LAB
ALBUMIN SERPL-MCNC: 4.5 G/DL (ref 3.6–5.1)
ALBUMIN/GLOB SERPL: 1.8 {RATIO} (ref 1–2.5)
ALP SERPL-CCNC: 67 U/L (ref 33–130)
ALT 1742-6: 33 U/L (ref 0–32)
AST 1920-8: 16 U/L (ref 0–35)
BILIRUB SERPL-MCNC: 0.7 MG/DL (ref 0.2–1.2)
BUN SERPL-MCNC: 12 MG/DL (ref 7–25)
BUN/CREATININE RATIO: 13.2 (ref 6–22)
CALCIUM SERPL-MCNC: 10.4 MG/DL (ref 8.6–10.3)
CHLORIDE SERPLBLD-SCNC: 99.7 MMOL/L (ref 98–110)
CHOLEST SERPL-MCNC: 219 MG/DL (ref 0–199)
CHOLEST/HDLC SERPL: 4 {RATIO} (ref 0–5)
CO2 SERPL-SCNC: 30.6 MMOL/L (ref 20–32)
CREAT SERPL-MCNC: 0.91 MG/DL (ref 0.6–1.3)
GLOBULIN, CALCULATED - QUEST: 2.5 (ref 1.9–3.7)
GLUCOSE SERPL-MCNC: 177 MG/DL (ref 60–99)
HBA1C MFR BLD: 8.5 % (ref 4–7)
HDLC SERPL-MCNC: 58 MG/DL (ref 40–150)
LDLC SERPL CALC-MCNC: 121 MG/DL (ref 0–130)
POTASSIUM SERPL-SCNC: 5.63 MMOL/L (ref 3.5–5.3)
PROT SERPL-MCNC: 7 G/DL (ref 6.1–8.1)
SODIUM SERPL-SCNC: 138.1 MMOL/L (ref 135–146)
TRIGL SERPL-MCNC: 200 MG/DL (ref 0–149)

## 2021-11-23 PROCEDURE — 36415 COLL VENOUS BLD VENIPUNCTURE: CPT | Performed by: FAMILY MEDICINE

## 2021-11-23 PROCEDURE — 83036 HEMOGLOBIN GLYCOSYLATED A1C: CPT | Performed by: FAMILY MEDICINE

## 2021-11-23 PROCEDURE — 99203 OFFICE O/P NEW LOW 30 MIN: CPT | Performed by: FAMILY MEDICINE

## 2021-11-23 PROCEDURE — 80053 COMPREHEN METABOLIC PANEL: CPT | Performed by: FAMILY MEDICINE

## 2021-11-23 PROCEDURE — 80061 LIPID PANEL: CPT | Performed by: FAMILY MEDICINE

## 2021-11-23 RX ORDER — METFORMIN HYDROCHLORIDE 750 MG/1
1500 TABLET, EXTENDED RELEASE ORAL
Qty: 180 TABLET | Refills: 0 | Status: SHIPPED | OUTPATIENT
Start: 2021-11-23 | End: 2022-03-09

## 2021-11-23 NOTE — PROGRESS NOTES
Assessment & Plan     Type 2 diabetes mellitus without complication, without long-term current use of insulin (H)  Poor control, has been off metformin, will restart and check in 3 mo  - metFORMIN (GLUCOPHAGE-XR) 750 MG 24 hr tablet  Dispense: 180 tablet; Refill: 0  - Comprehensive Metobolic Panel ([facility])  - HEMOGLOBIN A1C (B[facility])  - VENOUS COLLECTION    Mixed hyperlipidemia-uncontrolled  Control uncertain, recheck, never on statin  - Lipid Panel ([facility])  - Comprehensive Metobolic Panel ([facility])  - VENOUS COLLECTION    Elevated blood pressure reading without diagnosis of hypertension  Poor control, never on meds, recommend he start but he wishes to wait for now  - Comprehensive Metobolic Panel (BFP)  - VENOUS COLLECTION    Hemidiaphragm paralysis  Reviewed [facility] notes, has decreased lung function    Mixed simple and mucopurulent chronic bronchitis (H): spirometry  1-20  FVC&FEV1 = 1%  Pt has COPD in nonsmoker per notes, not on any inhalers, not interested in any today, discussed risks    NOEMI (obstructive sleep apnea)  Severe by notes, refuses to use CPAP as dos not like mask      Review of external notes as documented elsewhere in note  Review of the result(s) of each unique test - labs, studies  Ordering of each unique test  Prescription drug management         FUTURE APPOINTMENTS:       - Follow-up visit in 3 mo  Work on weight loss  Regular exercise    No follow-ups on file.    Diego Guerra MD  [facility]    Dalton Dykes is a 61 year old who presents for the following health issues     HPI     Diabetes Follow-up-off metformin as ran out and doctor retired      How often are you checking your blood sugar? Not at all    What concerns do you have today about your diabetes? None     Do you have any of these symptoms? (Select all that apply)  No numbness or tingling in feet.  No redness, sores or blisters on feet.  No complaints of excessive thirst.  No reports of blurry vision.  No  significant changes to weight.    Have you had a diabetic eye exam in the last 12 months? No                Hyperlipidemia Follow-Up      Are you regularly taking any medication or supplement to lower your cholesterol?   No    Are you having muscle aches or other side effects that you think could be caused by your cholesterol lowering medication?  No    Hypertension Follow-up      Do you check your blood pressure regularly outside of the clinic? No     Are you following a low salt diet? No    Are your blood pressures ever more than 140 on the top number (systolic) OR more   than 90 on the bottom number (diastolic), for example 140/90? No    BP Readings from Last 2 Encounters:   11/23/21 (!) 132/92   03/20/21 (!) 142/93     Hemoglobin A1C (%)   Date Value   05/27/2020 8.9 (H)   01/09/2020 7.6 (H)     LDL Cholesterol Calculated (mg/dL)   Date Value   02/02/2019 117 (H)   05/24/2013 157 (H)       COPD Follow-Up    Overall, how are your COPD symptoms since your last clinic visit?  No change    How much fatigue or shortness of breath do you have when you are walking?  Same as usual    How much shortness of breath do you have when you are resting?  Same as usual    How often do you cough? Rarely    Have you noticed any change in your sputum/phlegm?  No    Have you experienced a recent fever? No    Please describe how far you can walk without stopping to rest:  2-5 blocks    How many flights of stairs are you able to walk up without stopping?  2  Have you had any Emergency Room Visits, Urgent Care Visits, or Hospital Admissions because of your COPD since your last office visit?  Yes      How many times have you gone to the ED, Urgent Care, or been hospitalized for COPD since your last clinic visit?  1-had covid    History   Smoking Status     Never Smoker   Smokeless Tobacco     Former User     Comment: passive user     Lab Results   Component Value Date    FEV1 0.17 01/09/2020    LYY2BVR 79 01/09/2020         How many  servings of fruits and vegetables do you eat daily?  2-3    On average, how many sweetened beverages do you drink each day (Examples: soda, juice, sweet tea, etc.  Do NOT count diet or artificially sweetened beverages)?   1    How many days per week do you exercise enough to make your heart beat faster? 5    How many minutes a day do you exercise enough to make your heart beat faster? 20 - 29    How many days per week do you miss taking your medication? 0        Review of Systems   Constitutional, HEENT, cardiovascular, pulmonary, gi and gu systems are negative, except as otherwise noted.      Objective    BP (!) 132/92 (BP Location: Left arm, Patient Position: Chair, Cuff Size: Adult Large)   Pulse 84   Temp 97.9  F (36.6  C)   Resp 20   Wt 104.3 kg (230 lb)   BMI 36.02 kg/m    Body mass index is 36.02 kg/m .  Physical Exam   GENERAL: healthy, alert and no distress  EYES: Eyes grossly normal to inspection, PERRL and conjunctivae and sclerae normal  HENT: ear canals and TM's normal, nose and mouth without ulcers or lesions  NECK: no adenopathy, no asymmetry, masses, or scars and thyroid normal to palpation  RESP: lungs clear to auscultation - no rales, rhonchi or wheezes  CV: regular rate and rhythm, normal S1 S2, no S3 or S4, no murmur, click or rub, no peripheral edema and peripheral pulses strong  ABDOMEN: soft, nontender, no hepatosplenomegaly, no masses and bowel sounds normal  MS: no gross musculoskeletal defects noted, no edema  NEURO: Normal strength and tone, mentation intact and speech normal  PSYCH: mentation appears normal, affect normal/bright    Results for orders placed or performed in visit on 11/23/21 (from the past 24 hour(s))   HEMOGLOBIN A1C (BFP)   Result Value Ref Range    Hemoglobin A1C 8.5 (A) 4.0 - 7.0 %

## 2021-11-23 NOTE — NURSING NOTE
Donis Barraza is here to establish care. States that his prior MD retired. He was taking Metformin and was told that if he lost weight, may not have to take this anymore. States that he is down 30lbs.  He also stated that he does not like to take pills.    Questioned patient about current smoking habits.  Pt. has never smoked.  PULSE regular  My Chart:   CLASSIFICATION OF OVERWEIGHT AND OBESITY BY BMI                        Obesity Class           BMI(kg/m2)  Underweight                                    < 18.5  Normal                                         18.5-24.9  Overweight                                     25.0-29.9  OBESITY                     I                  30.0-34.9                             II                 35.0-39.9  EXTREME OBESITY             III                >40                            Patient's  BMI Body mass index is 36.02 kg/m .  http://hin.nhlbi.nih.gov/menuplanner/menu.cgi  Pre-visit planning  Immunizations - needs   Colonoscopy - is due and to be scheduled by patient for later completion  Mammogram -   Asthma -   PHQ9 -    DORCAS-7 -    Hearing Test -

## 2021-11-23 NOTE — LETTER
November 23, 2021      Donis ACNTOR Christi  [address] [facility] [address]        Donis CANTOR Guzmangloria     The results of your recent Kidney Tests and Liver Panel were within normal limits.     Your recent cholesterol numbers are elevated.  The new guidelines recommend we perform a risk calculation to determine if medications might be warranted. This calculation estimates your risk of a cardiovascular event in the next 10 years.  If the result is over 7.5% risk then statin medication is recommended. Your risk comes in at 16% so statin is definitely recommended    The 10-year ASCVD risk score (Haim BURROWS Jr., et al., 2013) is: 16.4%    Values used to calculate the score:      Age: 61 years      Sex: Male      Is Non- : No      Diabetic: Yes      Tobacco smoker: No      Systolic Blood Pressure: 132 mmHg      Is BP treated: No      HDL Cholesterol: 58 mg/dL      Total Cholesterol: 219 mg/dL     The results are now released on My Chart. Please contact me if you have further questions or concerns.    Take CHANNING díaz M.D.     Resulted Orders   Lipid Panel (BFP)   Result Value Ref Range    Cholesterol 219 (A) 0 - 199 mg/dL    Triglycerides 200 (A) 0 - 149 mg/dL    HDL Cholesterol 58 40 - 150 mg/dL    LDL Cholesterol Direct 121 0 - 130 mg/dL    Cholesterol/HDL Ratio 4 0 - 5   Comprehensive Metobolic Panel (BFP)   Result Value Ref Range    Carbon Dioxide 30.6 20 - 32 mmol/L    Creatinine 0.91 0.60 - 1.30 mg/dL    Glucose 177 (A) 60 - 99 mg/dL    Sodium 138.1 135 - 146 mmol/L    Potassium 5.63 (A) 3.5 - 5.3 mmol/L      Comment:      ran twice     Chloride 99.7 98 - 110 mmol/L    Protein Total 7.0 6.1 - 8.1 g/dL    Albumin 4.5 3.6 - 5.1 g/dL    Alkaline Phosphatase 67 33 - 130 U/L    ALT 33 (A) 0 - 32 U/L    AST 16 0 - 35 U/L    Bilirubin Total 0.7 0.2 - 1.2 mg/dL    Urea Nitrogen 12 7 - 25 mg/dL    Calcium 10.4 (A) 8.6 - 10.3 mg/dL      Comment:      ran twice     BUN/Creatinine Ratio  13.2 6 - 22    Globulin Calculated 2.5 1.9 - 3.7    A/G Ratio 1.8 1 - 2.5   HEMOGLOBIN A1C (BFP)   Result Value Ref Range    Hemoglobin A1C 8.5 (A) 4.0 - 7.0 %       If you have any questions or concerns, please call the clinic at the number listed above.       Sincerely,      Diego Guerra MD

## 2021-11-24 ENCOUNTER — TRANSFERRED RECORDS (OUTPATIENT)
Dept: FAMILY MEDICINE | Facility: CLINIC | Age: 61
End: 2021-11-24

## 2021-11-29 ENCOUNTER — TELEPHONE (OUTPATIENT)
Dept: FAMILY MEDICINE | Facility: CLINIC | Age: 61
End: 2021-11-29

## 2021-11-29 DIAGNOSIS — E78.2 MIXED HYPERLIPIDEMIA: Primary | ICD-10-CM

## 2021-11-29 NOTE — TELEPHONE ENCOUNTER
Patient called in and stated that he is willing to try a statin medication. He would like a 90 day supply and he can come in for a lab only after the 90 days is up to check and make sure it is working. He is also wondering about the GOLO for diabetics and wondering if this is something he should take or look into. Routing to Dr. Guerra for review.

## 2021-11-30 RX ORDER — ATORVASTATIN CALCIUM 20 MG/1
20 TABLET, FILM COATED ORAL DAILY
Qty: 90 TABLET | Refills: 0 | Status: SHIPPED | OUTPATIENT
Start: 2021-11-30 | End: 2022-02-07

## 2021-11-30 NOTE — TELEPHONE ENCOUNTER
Rx sent in, he can make lab only for recheck in 3 mo, let me know if side effects     I have no experience with GOLO- it is a supplement so not studied by FDA.  If patient likes we can send a question to [facility] about this.

## 2022-02-07 ENCOUNTER — OFFICE VISIT (OUTPATIENT)
Dept: FAMILY MEDICINE | Facility: CLINIC | Age: 62
End: 2022-02-07

## 2022-02-07 VITALS
DIASTOLIC BLOOD PRESSURE: 86 MMHG | BODY MASS INDEX: 35.52 KG/M2 | WEIGHT: 226.8 LBS | HEART RATE: 88 BPM | RESPIRATION RATE: 20 BRPM | TEMPERATURE: 97.4 F | SYSTOLIC BLOOD PRESSURE: 130 MMHG

## 2022-02-07 DIAGNOSIS — R22.9 SKIN NODULE: Primary | ICD-10-CM

## 2022-02-07 DIAGNOSIS — M79.10 MYALGIA: ICD-10-CM

## 2022-02-07 LAB
ALBUMIN SERPL-MCNC: 4.5 G/DL (ref 3.6–5.1)
ALP SERPL-CCNC: 66 U/L (ref 33–130)
ALT 1742-6: 19 U/L (ref 0–32)
AST 1920-8: 17 U/L (ref 0–35)
BILIRUB SERPL-MCNC: 0.9 MG/DL (ref 0.2–1.2)
BILIRUBIN DIRECT: 0.3 MG/DL (ref 0.1–0.4)
ERYTHROCYTE [SEDIMENTATION RATE] IN BLOOD: 7 MM/HR (ref 0–20)
PROT SERPL-MCNC: 7.2 G/DL (ref 6.1–8.1)

## 2022-02-07 PROCEDURE — 82550 ASSAY OF CK (CPK): CPT | Mod: 90 | Performed by: FAMILY MEDICINE

## 2022-02-07 PROCEDURE — 99213 OFFICE O/P EST LOW 20 MIN: CPT | Performed by: FAMILY MEDICINE

## 2022-02-07 PROCEDURE — 36415 COLL VENOUS BLD VENIPUNCTURE: CPT | Performed by: FAMILY MEDICINE

## 2022-02-07 PROCEDURE — 85651 RBC SED RATE NONAUTOMATED: CPT | Performed by: FAMILY MEDICINE

## 2022-02-07 PROCEDURE — 80076 HEPATIC FUNCTION PANEL: CPT | Performed by: FAMILY MEDICINE

## 2022-02-07 NOTE — LETTER
February 9, 2022      Donis Barraza  [address] [facility] [address]        Dear ,    We are writing to inform you of your test results.    Your test results fall within the expected range(s) or remain unchanged from previous results.  Please continue with current treatment plan. No signs of muscle damage from medication.     Resulted Orders   CK TOTAL ([facility])   Result Value Ref Range    CK Total 60 44 - 196 U/L    Narrative    FASTING: UNKNOWN   Hepatic Panel (BFP)   Result Value Ref Range    Protein Total 7.2 6.1 - 8.1 g/dL    Albumin 4.5 3.6 - 5.1 g/dL    Alkaline Phosphatase 66 33 - 130 U/L    ALT 19 0 - 32 U/L    AST 17 0 - 35 U/L    Bilirubin Direct 0.3 0.1 - 0.4 mg/dL    Bilirubin Total 0.9 0.2 - 1.2 mg/dL   ESR WESTERGREN (BFP)   Result Value Ref Range    Sed Rate 7 0 - 20 mm/hr       If you have any questions or concerns, please call the clinic at the number listed above.       Sincerely,      Diego Guerra MD

## 2022-02-07 NOTE — PROGRESS NOTES
"SUBJECTIVE: Donis Barraza is a 61 year old male who presents for lump on back x3 months-seemed to be getting bigger.  He rubbed it around over time and it seemed to resolve but now issues underneath skin.  No pain at rest- feels \"odd\", almost \"numb\"    Pt feels it is growing deeper now.    Pt also states he stopped atorvastatin a month ago, due to muscle aches and weakness in legs, still feels achy    Patient Active Problem List   Diagnosis     Anal fissure     SOB (shortness of breath)     Diaphragm paralysis     Class 2 severe obesity due to excess calories with serious comorbidity and body mass index (BMI) of 38.0 to 38.9 in adult (H)     Mixed hyperlipidemia     Uncontrolled diabetes mellitus type 2 without complications     Nonsmoker     Hypoxia since 2013     Fatty liver per 5-13 CT      History of colonic polyps one per 2-08 w fam hx same     Abnormal CT scan of lung 5-13 lingular nodule      Bronchitis subacute onset 11-18     Screening for diabetic peripheral neuropathy     Traumatic incomplete tear of right rotator cuff, subsequent encounter     Claustrophobia     Hx of repair of left rotator cuff     Mixed simple and mucopurulent chronic bronchitis (H): spirometry  1-20  FVC&FEV1 = 1%     Past Medical History:   Diagnosis Date     Arthritis      Diabetes (H)      h/o Rheumatic fever      Hemidiaphragm paralysis - right      Obese      Family History   Problem Relation Age of Onset     Diabetes Mother      Cardiovascular Mother         valvular heart disease     Eye Disorder Mother         unclear hereditary eye disease     Heart Disease Mother         afib      Other - See Comments Mother         low blood pressure     Cancer Mother         Squamous cell carcinoma of the salivary gland     Parkinsonism Mother 87     Gastrointestinal Disease Father         polyps/gall bladder disease     Myocardial Infarction Father 86     Heart Disease Father         afib     Other - See Comments Father         low blood " pressure     Coronary Artery Disease Father      Colon Polyps Father      Gastrointestinal Disease Brother         polyps     Colon Polyps Brother      Colon Polyps Brother      Parkinsonism Maternal Grandmother      No Known Problems Maternal Grandfather      Stomach Cancer Paternal Grandmother 80     Cancer Paternal Grandfather 55        Leukemia     Social History     Socioeconomic History     Marital status:      Spouse name: Not on file     Number of children: 4     Years of education: Not on file     Highest education level: Not on file   Occupational History     Employer: [employer]   Tobacco Use     Smoking status: Never Smoker     Smokeless tobacco: Former User     Tobacco comment: passive user   Substance and Sexual Activity     Alcohol use: Yes     Alcohol/week: 1.0 standard drink     Types: 1 Standard drinks or equivalent per week     Comment: very little     Drug use: No     Sexual activity: Yes   Other Topics Concern      Service Not Asked     Blood Transfusions Not Asked     Caffeine Concern Not Asked     Occupational Exposure Not Asked     Hobby Hazards Not Asked     Sleep Concern Not Asked     Stress Concern Not Asked     Weight Concern Not Asked     Special Diet Not Asked     Back Care Not Asked     Exercise No     Comment: very active at work     Bike Helmet Yes     Seat Belt Yes     Self-Exams Not Asked   Social History Narrative     Not on file     Social Determinants of Health     Financial Resource Strain: Not on file   Food Insecurity: Not on file   Transportation Needs: Not on file   Physical Activity: Not on file   Stress: Not on file   Social Connections: Not on file   Intimate Partner Violence: Not on file   Housing Stability: Not on file     Past Surgical History:   Procedure Laterality Date     ARTHROSCOPY KNEE Left 6/22/2020    Procedure: Left knee arthroscopic partial medial meniscectomy;  Surgeon: Giovani Wood MD;  Location:  OR      ARTHROTOMY SHOULDER, ROTATOR CUFF REPAIR, COMBINED Right 2/10/2020    Procedure: Rotator cuff repair, right shoulder;  Surgeon: Giovani Wood MD;  Location: [facility] OR     SHOULDER SURGERY Left 2007    s/p trauma     TONSILLECTOMY, ADENOIDECTOMY, COMBINED       ZZC APPENDECTOMY  1980's     metFORMIN (GLUCOPHAGE-XR) 750 MG 24 hr tablet, Take 2 tablets (1,500 mg) by mouth daily (with dinner)    No current facility-administered medications on file prior to visit.       Allergies: No known drug allergies    Immunization History   Administered Date(s) Administered     TD (ADULT, 7+) 01/01/1999     TDAP Vaccine (Adacel) 01/01/2014        OBJECTIVE: /86 (BP Location: Left arm, Patient Position: Chair, Cuff Size: Adult Large)   Pulse 88   Temp 97.4  F (36.3  C)   Resp 20   Wt 102.9 kg (226 lb 12.8 oz)   BMI 35.52 kg/m     Skin: right lower mid lumbar- 3 cm nodular swelling -feels somewhat mobile, tender    Area higher that appears normal, he feels odd sensation    ASSESSMENT: /PLAN:   (R22.9) Skin nodule  (primary encounter diagnosis)  Comment: recommend surgery eval, may need imaging  Plan: Adult General Surg Referral            (M79.10) Myalgia  Comment: unclear if statin issue, could also be PMR  Plan: CK TOTAL ([facility]), Hepatic Panel ([facility]), VENOUS         COLLECTION, ESR WESTERGREN ([facility])        Labs, stay off statin, f/u if not improving or worsening

## 2022-02-09 LAB — CK SERPL-CCNC: 60 U/L (ref 44–196)

## 2022-02-17 PROBLEM — E11.65 TYPE 2 DIABETES MELLITUS WITH HYPERGLYCEMIA (H): Chronic | Status: ACTIVE | Noted: 2019-02-02

## 2022-02-28 ENCOUNTER — OFFICE VISIT (OUTPATIENT)
Dept: SURGERY | Facility: CLINIC | Age: 62
End: 2022-02-28
Payer: COMMERCIAL

## 2022-02-28 VITALS
HEIGHT: 67 IN | WEIGHT: 220 LBS | SYSTOLIC BLOOD PRESSURE: 128 MMHG | RESPIRATION RATE: 16 BRPM | BODY MASS INDEX: 34.53 KG/M2 | OXYGEN SATURATION: 98 % | DIASTOLIC BLOOD PRESSURE: 84 MMHG | HEART RATE: 81 BPM

## 2022-02-28 DIAGNOSIS — R22.2 SUBCUTANEOUS MASS OF BACK: Primary | ICD-10-CM

## 2022-02-28 PROCEDURE — 99203 OFFICE O/P NEW LOW 30 MIN: CPT | Performed by: SURGERY

## 2022-02-28 NOTE — PROGRESS NOTES
"HPI:  Donis presents today for a subcutaneous mass on his right lower back for the past 4 months. He denies trauma at to the site.  He has had  no drainage from the site in the past.  He has no history of  infection.  It is becoming painful.  It's size is increasing.  He reports that the mass has \"gone into the muscle\".  On further questioning this seems to be that the mass has decreased in size or changed in geometry.    Anticoagulant: none listed in EPIC    PE:  There were no vitals taken for this visit.  General appearance: well-nourished, no apparent distress  Lungs: respirations unlabored  Neurologic: nonfocal, grossly intact times four extremities, alert and oriented times three  Psychiatric: Mood and affect are appropriate  Skin: There is a 4 cm subcutaneous mass on the lower back, just to the right of the spinous processes about L2.  The overlying skin is  normal.  It is mildly tender.  It appears to be within the deeper subcutaneous tissues    Impression: Subcutaneous mass, probable lipoma         Plan:  We discussed the option of excision and closure or MRI scanning to evaluate a mass.  If the MRI scan shows benign fat at the site of the nodule, he could potentially leave this in place.  He will consider his options and let us know how he would like to proceed    30  minutes spent on the date of the encounter and chart review, review of test results, patient visit, physical exam, education, counseling, development of care plan and documentation.    Kaveh Caputo MD    Please route or send letter to:  Primary Care Provider (PCP) and Include Progress Note          "

## 2022-02-28 NOTE — LETTER
"2022    RE: Donis Barraza, : 1960      HPI:  Donis presents today for a subcutaneous mass on his right lower back for the past 4 months. He denies trauma at to the site.  He has had  no drainage from the site in the past.  He has no history of  infection.  It is becoming painful.  It's size is increasing.  He reports that the mass has \"gone into the muscle\".  On further questioning this seems to be that the mass has decreased in size or changed in geometry.     Anticoagulant: none listed in EPIC     PE:  There were no vitals taken for this visit.  General appearance: well-nourished, no apparent distress  Lungs: respirations unlabored  Neurologic: nonfocal, grossly intact times four extremities, alert and oriented times three  Psychiatric: Mood and affect are appropriate  Skin: There is a 4 cm subcutaneous mass on the lower back, just to the right of the spinous processes about L2.  The overlying skin is  normal.  It is mildly tender.  It appears to be within the deeper subcutaneous tissues     Impression: Subcutaneous mass, probable lipoma         Plan:  We discussed the option of excision and closure or MRI scanning to evaluate a mass.  If the MRI scan shows benign fat at the site of the nodule, he could potentially leave this in place.  He will consider his options and let us know how he would like to proceed          Kaveh Caputo MD     "

## 2022-03-03 DIAGNOSIS — E11.9 TYPE 2 DIABETES MELLITUS WITHOUT COMPLICATION, WITHOUT LONG-TERM CURRENT USE OF INSULIN (H): ICD-10-CM

## 2022-03-04 RX ORDER — METFORMIN HYDROCHLORIDE 750 MG/1
TABLET, EXTENDED RELEASE ORAL
Qty: 180 TABLET | Refills: 0 | COMMUNITY
Start: 2022-03-04

## 2022-03-04 NOTE — TELEPHONE ENCOUNTER
Donis Barraza is requesting a refill of:    Refused Prescriptions:                       Disp   Refills    metFORMIN (GLUCOPHAGE-XR) 750 MG 24 hr tab*180 ta*0        Sig: TAKE 2 TABLETS(1500 MG) BY MOUTH DAILY WITH DINNER  Refused By: PURVI MAGALLANES  Reason for Refusal: Patient needs appointment    Pt needs OV for refills. Does not need to be fasting

## 2022-03-09 DIAGNOSIS — E11.9 TYPE 2 DIABETES MELLITUS WITHOUT COMPLICATION, WITHOUT LONG-TERM CURRENT USE OF INSULIN (H): ICD-10-CM

## 2022-03-09 RX ORDER — METFORMIN HYDROCHLORIDE 750 MG/1
TABLET, EXTENDED RELEASE ORAL
Qty: 20 TABLET | Refills: 0 | Status: SHIPPED | OUTPATIENT
Start: 2022-03-09 | End: 2022-03-17

## 2022-03-09 NOTE — TELEPHONE ENCOUNTER
Pt scheduled appt 03/17/22 to recheck A1C. Please send in short supply of Metformin.    Donis Barraza is requesting a refill of:    Pending Prescriptions:                       Disp   Refills    metFORMIN (GLUCOPHAGE-XR) 750 MG 24 hr ta*20 tab*0            Sig: TAKE 2 TABLETS(1500 MG) BY MOUTH DAILY WITH           DINNER

## 2022-03-17 ENCOUNTER — OFFICE VISIT (OUTPATIENT)
Dept: FAMILY MEDICINE | Facility: CLINIC | Age: 62
End: 2022-03-17

## 2022-03-17 VITALS
TEMPERATURE: 98.2 F | OXYGEN SATURATION: 95 % | HEART RATE: 76 BPM | HEIGHT: 67 IN | SYSTOLIC BLOOD PRESSURE: 124 MMHG | WEIGHT: 219 LBS | DIASTOLIC BLOOD PRESSURE: 84 MMHG | BODY MASS INDEX: 34.37 KG/M2

## 2022-03-17 DIAGNOSIS — J41.8 MIXED SIMPLE AND MUCOPURULENT CHRONIC BRONCHITIS (H): ICD-10-CM

## 2022-03-17 DIAGNOSIS — J98.6 HEMIDIAPHRAGM PARALYSIS: ICD-10-CM

## 2022-03-17 DIAGNOSIS — G47.33 OSA (OBSTRUCTIVE SLEEP APNEA): ICD-10-CM

## 2022-03-17 DIAGNOSIS — E78.2 MIXED HYPERLIPIDEMIA: ICD-10-CM

## 2022-03-17 DIAGNOSIS — Z12.11 SPECIAL SCREENING FOR MALIGNANT NEOPLASMS, COLON: ICD-10-CM

## 2022-03-17 DIAGNOSIS — E11.9 TYPE 2 DIABETES MELLITUS WITHOUT COMPLICATION, WITHOUT LONG-TERM CURRENT USE OF INSULIN (H): Primary | ICD-10-CM

## 2022-03-17 DIAGNOSIS — Z12.5 SPECIAL SCREENING FOR MALIGNANT NEOPLASM OF PROSTATE: ICD-10-CM

## 2022-03-17 DIAGNOSIS — R03.0 ELEVATED BLOOD PRESSURE READING WITHOUT DIAGNOSIS OF HYPERTENSION: ICD-10-CM

## 2022-03-17 LAB
ALBUMIN (URINE) MG/L: 10
ALBUMIN SERPL-MCNC: 4.6 G/DL (ref 3.6–5.1)
ALBUMIN URINE MG/G CR: <30 MG/G CREATININE
ALBUMIN/GLOB SERPL: 1.9 {RATIO} (ref 1–2.5)
ALP SERPL-CCNC: 65 U/L (ref 33–130)
ALT 1742-6: 22 U/L (ref 0–32)
AST 1920-8: 19 U/L (ref 0–35)
BILIRUB SERPL-MCNC: 0.6 MG/DL (ref 0.2–1.2)
BUN SERPL-MCNC: 17 MG/DL (ref 7–25)
BUN/CREATININE RATIO: 14.3 (ref 6–22)
CALCIUM SERPL-MCNC: 9.7 MG/DL (ref 8.6–10.3)
CHLORIDE SERPLBLD-SCNC: 100.4 MMOL/L (ref 98–110)
CO2 SERPL-SCNC: 31.7 MMOL/L (ref 20–32)
CREAT SERPL-MCNC: 0.98 MG/DL (ref 0.6–1.3)
CREATININE URINE MG/DL: 300 MG/DL
GLOBULIN, CALCULATED - QUEST: 2.4 (ref 1.9–3.7)
GLUCOSE SERPL-MCNC: 172 MG/DL (ref 60–99)
HBA1C MFR BLD: 7.5 % (ref 4–7)
POTASSIUM SERPL-SCNC: 5.22 MMOL/L (ref 3.5–5.3)
PROT SERPL-MCNC: 7 G/DL (ref 6.1–8.1)
SODIUM SERPL-SCNC: 137.7 MMOL/L (ref 135–146)

## 2022-03-17 PROCEDURE — 84153 ASSAY OF PSA TOTAL: CPT | Mod: 90 | Performed by: FAMILY MEDICINE

## 2022-03-17 PROCEDURE — 82043 UR ALBUMIN QUANTITATIVE: CPT | Performed by: FAMILY MEDICINE

## 2022-03-17 PROCEDURE — 82570 ASSAY OF URINE CREATININE: CPT | Performed by: FAMILY MEDICINE

## 2022-03-17 PROCEDURE — 83036 HEMOGLOBIN GLYCOSYLATED A1C: CPT | Performed by: FAMILY MEDICINE

## 2022-03-17 PROCEDURE — 36415 COLL VENOUS BLD VENIPUNCTURE: CPT | Performed by: FAMILY MEDICINE

## 2022-03-17 PROCEDURE — 80053 COMPREHEN METABOLIC PANEL: CPT | Performed by: FAMILY MEDICINE

## 2022-03-17 PROCEDURE — 99214 OFFICE O/P EST MOD 30 MIN: CPT | Performed by: FAMILY MEDICINE

## 2022-03-17 RX ORDER — METFORMIN HYDROCHLORIDE 750 MG/1
TABLET, EXTENDED RELEASE ORAL
Qty: 180 TABLET | Refills: 1 | Status: SHIPPED | OUTPATIENT
Start: 2022-03-17 | End: 2022-09-06

## 2022-03-17 NOTE — NURSING NOTE
Chief Complaint   Patient presents with     Recheck Medication     fasting today, refill medications     Pre-visit Screening:  Immunizations:  not up to date - declines vaccines  Colonoscopy:  is due and ordered today  Mammogram: NA  Asthma Action Test/Plan:  NA  PHQ9:  NA  GAD7:  NA  Questioned patient about current smoking habits Pt. has never smoked.  Ok to leave detailed message on voice mail for today's visit only Yes, phone # [phone]

## 2022-03-17 NOTE — LETTER
March 21, 2022      Donis Barraza  [address] [facility] [address]        Dear ,    We are writing to inform you of your test results.    Your test results fall within the expected range(s) or remain unchanged from previous results.  Please continue with current treatment plan. Kidney test, liver, PSA normal.    Resulted Orders   HEMOGLOBIN A1C ([facility])   Result Value Ref Range    Hemoglobin A1C POCT 7.5 (A) 4.0 - 7.0 %   ALBUMIN RANDOM URINE QUANTITATIVE ([facility])   Result Value Ref Range    Albumin mg/L 10 30    Creatinine Urine mg/dL 300 300 mg/dL    Albumin Urine mg/g Cr <30 30 MG/G Creatinine   Comprehensive Metobolic Panel ([facility])   Result Value Ref Range    Carbon Dioxide 31.7 20 - 32 mmol/L    Creatinine 0.98 0.60 - 1.30 mg/dL    Glucose 172 (A) 60 - 99 mg/dL    Sodium 137.7 135 - 146 mmol/L    Potassium 5.22 3.5 - 5.3 mmol/L    Chloride 100.4 98 - 110 mmol/L    Protein Total 7.0 6.1 - 8.1 g/dL    Albumin 4.6 3.6 - 5.1 g/dL    Alkaline Phosphatase 65 33 - 130 U/L    ALT 22 0 - 32 U/L    AST 19 0 - 35 U/L    Bilirubin Total 0.6 0.2 - 1.2 mg/dL    Urea Nitrogen 17 7 - 25 mg/dL    Calcium 9.7 8.6 - 10.3 mg/dL    BUN/Creatinine Ratio 14.3 6 - 22    Globulin Calculated 2.4 1.9 - 3.7    A/G Ratio 1.9 1 - 2.5   PSA Total ([facility])   Result Value Ref Range    Abbott PSA 0.33 < OR = 4.00 ng/mL      Comment:      The total PSA value from this assay system is   standardized against the WHO standard. The test   result will be approximately 20% lower when compared   to the equimolar-standardized total PSA (Travis   Moris). Comparison of serial PSA results should be   interpreted with this fact in mind.     This test was performed using the Siemens   chemiluminescent method. Values obtained from   different assay methods cannot be used  interchangeably. PSA levels, regardless of  value, should not be interpreted as absolute  evidence of the presence or absence of disease.         If you have any questions or  concerns, please call the clinic at the number listed above.       Sincerely,      Diego Guerra MD

## 2022-03-17 NOTE — PROGRESS NOTES
"  Assessment & Plan     Type 2 diabetes mellitus without complication, without long-term current use of insulin (H)  Improved control, discussed option to increase metformin-he prefers to work harder on habits  - HEMOGLOBIN A1C (B[facility])  - VENOUS COLLECTION  - metFORMIN (GLUCOPHAGE-XR) 750 MG 24 hr tablet  Dispense: 180 tablet; Refill: 1  - ALBUMIN RANDOM URINE QUANTITATIVE (BFP)  - Comprehensive Metobolic Panel (BFP)    Mixed hyperlipidemia  Control likely poor-did not tolerate crestor, previously did not tolerate simvastatin or atorvastatin, await labs, consider trial pravastatin  - Comprehensive Metobolic Panel (B[facility])    Elevated blood pressure reading without diagnosis of hypertension  Well controlled today    Hemidiaphragm paralysis      Mixed simple and mucopurulent chronic bronchitis (H): spirometry  1-20  FVC&FEV1 = 1%  Pt was diagnosed with COPD but more related to diaphragm paralysis-feels breathing fine    NOEMI (obstructive sleep apnea)  Does not use cpap    Special screening for malignant neoplasms, colon    - Adult Gastro Ref - Procedure Only    Special screening for malignant neoplasm of prostate    - PSA Total ([facility])      Review of the result(s) of each unique test - labs  Ordering of each unique test  Prescription drug management         BMI:   Estimated body mass index is 34.3 kg/m  as calculated from the following:    Height as of this encounter: 1.702 m (5' 7\").    Weight as of this encounter: 99.3 kg (219 lb).   Weight management plan: Discussed healthy diet and exercise guidelines    FUTURE APPOINTMENTS:       - Follow-up visit in 6 mo  Work on weight loss  Regular exercise    No follow-ups on file.    Diego Guerra MD  [facility]    Dalton Dykes is a 61 year old who presents for the following health issues     HPI     Diabetes Follow-up-back on metformin      How often are you checking your blood sugar? Not at all    What concerns do you have today about your " diabetes? None     Do you have any of these symptoms? (Select all that apply)  No numbness or tingling in feet.  No redness, sores or blisters on feet.  No complaints of excessive thirst.  No reports of blurry vision.  No significant changes to weight.    Have you had a diabetic eye exam in the last 12 months? Yes, Dr Silva        BP Readings from Last 2 Encounters:   03/17/22 124/84   02/28/22 128/84     Hemoglobin A1C POCT (%)   Date Value   11/23/2021 8.5 (A)   05/27/2020 8.9 (H)     LDL Cholesterol Calculated (mg/dL)   Date Value   02/02/2019 117 (H)   05/24/2013 157 (H)     LDL Cholesterol Direct (mg/dL)   Date Value   11/23/2021 121               Hyperlipidemia Sdhnad-Fl-lzxtsly statin due to joint aches      Are you regularly taking any medication or supplement to lower your cholesterol?   No    Are you having muscle aches or other side effects that you think could be caused by your cholesterol lowering medication?  No    COPD Follow-Up    Overall, how are your COPD symptoms since your last clinic visit?  No change    How much fatigue or shortness of breath do you have when you are walking?  Same as usual    How much shortness of breath do you have when you are resting?  Same as usual    How often do you cough? Rarely    Have you noticed any change in your sputum/phlegm?  No    Have you experienced a recent fever? No    Please describe how far you can walk without stopping to rest:  1-2 miles    How many flights of stairs are you able to walk up without stopping?  1    Have you had any Emergency Room Visits, Urgent Care Visits, or Hospital Admissions because of your COPD since your last office visit?  No    History   Smoking Status     Never Smoker   Smokeless Tobacco     Former User     Comment: passive user     Lab Results   Component Value Date    FEV1 0.17 01/09/2020    [id number] 79 01/09/2020         How many servings of fruits and vegetables do you eat daily?  2-3    On average, how many sweetened  "beverages do you drink each day (Examples: soda, juice, sweet tea, etc.  Do NOT count diet or artificially sweetened beverages)?   1    How many days per week do you exercise enough to make your heart beat faster? 5    How many minutes a day do you exercise enough to make your heart beat faster? 30 - 60    How many days per week do you miss taking your medication? 0        Review of Systems   Constitutional, HEENT, cardiovascular, pulmonary, gi and gu systems are negative, except as otherwise noted.      Objective    /84 (BP Location: Right arm, Patient Position: Sitting, Cuff Size: Adult Large)   Pulse 76   Temp 98.2  F (36.8  C) (Temporal)   Ht 1.702 m (5' 7\")   Wt 99.3 kg (219 lb)   SpO2 95%   BMI 34.30 kg/m    Body mass index is 34.3 kg/m .  Physical Exam   GENERAL: healthy, alert and no distress  EYES: Eyes grossly normal to inspection, PERRL and conjunctivae and sclerae normal  HENT: ear canals and TM's normal, nose and mouth without ulcers or lesions  NECK: no adenopathy, no asymmetry, masses, or scars and thyroid normal to palpation  RESP: lungs clear to auscultation - no rales, rhonchi or wheezes  CV: regular rate and rhythm, normal S1 S2, no S3 or S4, no murmur, click or rub, no peripheral edema and peripheral pulses strong  ABDOMEN: soft, nontender, no hepatosplenomegaly, no masses and bowel sounds normal  MS: no gross musculoskeletal defects noted, no edema  NEURO: Normal strength and tone, mentation intact and speech normal  PSYCH: mentation appears normal, affect normal/bright  Diabetic foot exam: normal DP and PT pulses, no trophic changes or ulcerative lesions and normal sensory exam    Results for orders placed or performed in visit on 03/17/22 (from the past 24 hour(s))   HEMOGLOBIN A1C (BFP)   Result Value Ref Range    Hemoglobin A1C POCT 7.5 (A) 4.0 - 7.0 %                 "

## 2022-03-18 LAB — ABBOTT PSA - QUEST: 0.33 NG/ML

## 2022-09-05 DIAGNOSIS — E11.9 TYPE 2 DIABETES MELLITUS WITHOUT COMPLICATION, WITHOUT LONG-TERM CURRENT USE OF INSULIN (H): ICD-10-CM

## 2022-09-06 RX ORDER — METFORMIN HYDROCHLORIDE 750 MG/1
TABLET, EXTENDED RELEASE ORAL
Qty: 28 TABLET | Refills: 0 | COMMUNITY
Start: 2022-09-06 | End: 2022-09-09

## 2022-09-06 RX ORDER — METFORMIN HYDROCHLORIDE 750 MG/1
TABLET, EXTENDED RELEASE ORAL
Qty: 180 TABLET | Refills: 1 | COMMUNITY
Start: 2022-09-06

## 2022-09-06 NOTE — TELEPHONE ENCOUNTER
Pt scheduled appt for this Friday but may have to reschedule for some time next week. I informed him that I would call in a 2 week supply just in case of his   Pending Prescriptions:                       Disp   Refills    metFORMIN (GLUCOPHAGE-XR) 750 MG 24 hr ta*180 ta*1            Sig: TAKE 2 TABLETS(1500 MG) BY MOUTH DAILY WITH           DINNER  Refused Prescriptions:                       Disp   Refills    metFORMIN (GLUCOPHAGE-XR) 750 MG 24 hr tab*180 ta*1        Sig: TAKE 2 TABLETS(1500 MG) BY MOUTH DAILY WITH DINNER  Refused By: PURVI MAGALLANES  Reason for Refusal: Patient needs appointment

## 2022-09-06 NOTE — TELEPHONE ENCOUNTER
Donis Barraza is requesting a refill of:    Refused Prescriptions:                       Disp   Refills    metFORMIN (GLUCOPHAGE-XR) 750 MG 24 hr tab*180 ta*1        Sig: TAKE 2 TABLETS(1500 MG) BY MOUTH DAILY WITH DINNER  Refused By: PURVI MAGALLANES  Reason for Refusal: Patient needs appointment

## 2022-09-09 ENCOUNTER — OFFICE VISIT (OUTPATIENT)
Dept: FAMILY MEDICINE | Facility: CLINIC | Age: 62
End: 2022-09-09

## 2022-09-09 VITALS
BODY MASS INDEX: 35.55 KG/M2 | WEIGHT: 227 LBS | OXYGEN SATURATION: 96 % | RESPIRATION RATE: 20 BRPM | HEART RATE: 79 BPM | TEMPERATURE: 97.8 F | DIASTOLIC BLOOD PRESSURE: 96 MMHG | SYSTOLIC BLOOD PRESSURE: 128 MMHG

## 2022-09-09 DIAGNOSIS — E11.9 TYPE 2 DIABETES MELLITUS WITHOUT COMPLICATION, WITHOUT LONG-TERM CURRENT USE OF INSULIN (H): Primary | ICD-10-CM

## 2022-09-09 DIAGNOSIS — E78.2 MIXED HYPERLIPIDEMIA: ICD-10-CM

## 2022-09-09 DIAGNOSIS — G47.33 OSA (OBSTRUCTIVE SLEEP APNEA): ICD-10-CM

## 2022-09-09 DIAGNOSIS — J41.8 MIXED SIMPLE AND MUCOPURULENT CHRONIC BRONCHITIS (H): ICD-10-CM

## 2022-09-09 DIAGNOSIS — J98.6 HEMIDIAPHRAGM PARALYSIS: ICD-10-CM

## 2022-09-09 DIAGNOSIS — R53.83 FATIGUE, UNSPECIFIED TYPE: ICD-10-CM

## 2022-09-09 DIAGNOSIS — E11.9 TYPE 2 DIABETES MELLITUS WITHOUT COMPLICATION, WITHOUT LONG-TERM CURRENT USE OF INSULIN (H): ICD-10-CM

## 2022-09-09 DIAGNOSIS — I10 HTN (HYPERTENSION), BENIGN: ICD-10-CM

## 2022-09-09 LAB
% GRANULOCYTES: 65.9 %
ALBUMIN SERPL-MCNC: 4.5 G/DL (ref 3.6–5.1)
ALBUMIN/GLOB SERPL: 2 {RATIO} (ref 1–2.5)
ALP SERPL-CCNC: 56 U/L (ref 33–130)
ALT 1742-6: 21 U/L (ref 0–32)
AST 1920-8: 17 U/L (ref 0–35)
BILIRUB SERPL-MCNC: 0.5 MG/DL (ref 0.2–1.2)
BUN SERPL-MCNC: 16 MG/DL (ref 7–25)
BUN/CREATININE RATIO: 16.5 (ref 6–22)
CALCIUM SERPL-MCNC: 9.2 MG/DL (ref 8.6–10.3)
CHLORIDE SERPLBLD-SCNC: 103.7 MMOL/L (ref 98–110)
CHOLEST SERPL-MCNC: 215 MG/DL (ref 0–199)
CHOLEST/HDLC SERPL: 5 {RATIO} (ref 0–5)
CO2 SERPL-SCNC: 29.4 MMOL/L (ref 20–32)
CREAT SERPL-MCNC: 0.97 MG/DL (ref 0.6–1.3)
GLOBULIN, CALCULATED - QUEST: 2.3 (ref 1.9–3.7)
GLUCOSE SERPL-MCNC: 199 MG/DL (ref 60–99)
HBA1C MFR BLD: 7.8 % (ref 4–7)
HCT VFR BLD AUTO: 44.8 % (ref 40–53)
HDLC SERPL-MCNC: 45 MG/DL (ref 40–150)
HEMOGLOBIN: 14.9 G/DL (ref 13.3–17.7)
LDLC SERPL CALC-MCNC: 144 MG/DL (ref 0–130)
LYMPHOCYTES NFR BLD AUTO: 27.1 %
MCH RBC QN AUTO: 31 PG (ref 26–33)
MCHC RBC AUTO-ENTMCNC: 33.3 G/DL (ref 31–36)
MCV RBC AUTO: 93.3 FL (ref 78–100)
MONOCYTES NFR BLD AUTO: 7 %
PLATELET COUNT - QUEST: 267 10^9/L (ref 150–375)
POTASSIUM SERPL-SCNC: 4.85 MMOL/L (ref 3.5–5.3)
PROT SERPL-MCNC: 6.8 G/DL (ref 6.1–8.1)
RBC # BLD AUTO: 4.8 10*12/L (ref 4.4–5.9)
SODIUM SERPL-SCNC: 139.5 MMOL/L (ref 135–146)
TRIGL SERPL-MCNC: 132 MG/DL (ref 0–149)
WBC # BLD AUTO: 7.5 10*9/L (ref 4–11)

## 2022-09-09 PROCEDURE — 84443 ASSAY THYROID STIM HORMONE: CPT | Mod: 90 | Performed by: FAMILY MEDICINE

## 2022-09-09 PROCEDURE — 99214 OFFICE O/P EST MOD 30 MIN: CPT | Performed by: FAMILY MEDICINE

## 2022-09-09 PROCEDURE — 80061 LIPID PANEL: CPT | Performed by: FAMILY MEDICINE

## 2022-09-09 PROCEDURE — 80053 COMPREHEN METABOLIC PANEL: CPT | Performed by: FAMILY MEDICINE

## 2022-09-09 PROCEDURE — 36415 COLL VENOUS BLD VENIPUNCTURE: CPT | Performed by: FAMILY MEDICINE

## 2022-09-09 PROCEDURE — 85025 COMPLETE CBC W/AUTO DIFF WBC: CPT | Performed by: FAMILY MEDICINE

## 2022-09-09 PROCEDURE — 83036 HEMOGLOBIN GLYCOSYLATED A1C: CPT | Performed by: FAMILY MEDICINE

## 2022-09-09 RX ORDER — METFORMIN HCL 500 MG
2000 TABLET, EXTENDED RELEASE 24 HR ORAL
Qty: 360 TABLET | Refills: 1 | Status: SHIPPED | OUTPATIENT
Start: 2022-09-09 | End: 2023-03-06

## 2022-09-09 RX ORDER — LISINOPRIL 10 MG/1
10 TABLET ORAL DAILY
Qty: 90 TABLET | Refills: 1 | Status: SHIPPED | OUTPATIENT
Start: 2022-09-09 | End: 2023-03-06

## 2022-09-09 RX ORDER — METFORMIN HCL 500 MG
2000 TABLET, EXTENDED RELEASE 24 HR ORAL
Qty: 360 TABLET | Refills: 1 | Status: SHIPPED | OUTPATIENT
Start: 2022-09-09 | End: 2022-09-09

## 2022-09-09 NOTE — LETTER
September 12, 2022      Donis Barraza  [address] [facility] [address]        Dear ,    We are writing to inform you of your test results.    Your test results fall mainly within the expected range(s) or remain unchanged from previous results. The lipids are elevated a bit more.  I know you have not tolerated simvastatin, atorvastatin, or rosuvastatin but we could try pravastatin.  The other option would be to have you see cardiology to consider an injectable lipid lowering medication.  Let me know what you think.      Please continue with current treatment plan.    Resulted Orders   HEMOGLOBIN A1C (BFP)   Result Value Ref Range    Hemoglobin A1C 7.8 (A) 4.0 - 7.0 %   Lipid Panel (BFP)   Result Value Ref Range    Cholesterol 215 (A) 0 - 199 mg/dL    Triglycerides 132 0 - 149 mg/dL    HDL Cholesterol 45 40 - 150 mg/dL    LDL Cholesterol Direct 144 (A) 0 - 130 mg/dL    Cholesterol/HDL Ratio 5 0 - 5   Comprehensive Metobolic Panel (BFP)   Result Value Ref Range    Carbon Dioxide 29.4 20 - 32 mmol/L    Creatinine 0.97 0.60 - 1.30 mg/dL    Glucose 199 (A) 60 - 99 mg/dL    Sodium 139.5 135 - 146 mmol/L    Potassium 4.85 3.5 - 5.3 mmol/L    Chloride 103.7 98 - 110 mmol/L    Protein Total 6.8 6.1 - 8.1 g/dL    Albumin 4.5 3.6 - 5.1 g/dL    Alkaline Phosphatase 56 33 - 130 U/L    ALT 21 0 - 32 U/L    AST 17 0 - 35 U/L    Bilirubin Total 0.5 0.2 - 1.2 mg/dL    Urea Nitrogen 16 7 - 25 mg/dL    Calcium 9.2 8.6 - 10.3 mg/dL    BUN/Creatinine Ratio 16.5 6 - 22    Globulin Calculated 2.3 1.9 - 3.7    A/G Ratio 2.0 1 - 2.5   HEMOGRAM PLATELET DIFF (BFP)   Result Value Ref Range    WBC 7.5 4.0 - 11 10*9/L    RBC Count 4.80 4.4 - 5.9 10*12/L    Hemoglobin 14.9 13.3 - 17.7 g/dL    Hematocrit 44.8 40.0 - 53.0 %    MCV 93.3 78 - 100 fL    MCH 31.0 26 - 33 pg    MCHC 33.3 31 - 36 g/dL    Platelet Count 267 150 - 375 10^9/L    % Granulocytes 65.9 %    % Lymphocytes 27.1 %    % Monocytes 7.0 %   TSH WITH FREE T4 REFLEX  ([facility])   Result Value Ref Range    TSH 1.11 0.40 - 4.50 mIU/L       If you have any questions or concerns, please call the clinic at the number listed above.       Sincerely,      Diego Guerra MD

## 2022-09-09 NOTE — TELEPHONE ENCOUNTER
Can you resend this for [facility], he sent it incorrectly at pt's visit today.    Donis Barraza is requesting a refill of:    Pending Prescriptions:                       Disp   Refills    metFORMIN (GLUCOPHAGE XR) 500 MG 24 hr ta*360 ta*1            Sig: Take 4 tablets (2,000 mg) by mouth daily (with           dinner)

## 2022-09-09 NOTE — NURSING NOTE
Chief Complaint   Patient presents with     Recheck Medication     Fasting medication check and review     Pre-visit Screening:  Immunizations:  up to date  Colonoscopy:  is up to date  Mammogram: NA  Asthma Action Test/Plan:  NA  PHQ9:  NA  GAD7:  NA  Questioned patient about current smoking habits Pt. has never smoked.  Ok to leave detailed message on voice mail for today's visit only Yes, phone # [phone]

## 2022-09-09 NOTE — PROGRESS NOTES
Assessment & Plan     Type 2 diabetes mellitus without complication, without long-term current use of insulin (H)  suboptimal control, we agree to increase metformin, I reviewed the risks, benefits, and possible side effects of the medication.  The patient had an opportunity to ask any questions regarding the treatment plan. The patient was encouraged to call my office if any problems. Continue to work on healthy diet and exercise, discussed healthy habits   - HEMOGLOBIN A1C (BFP)  - VENOUS COLLECTION  - metFORMIN (GLUCOPHAGE XR) 500 MG 24 hr tablet  Dispense: 360 tablet; Refill: 1  - Comprehensive Metobolic Panel (BFP)    Mixed hyperlipidemia  Control uncertain, Continue to work on healthy diet and exercise, discussed healthy habits   - Lipid Panel ([facility]FP)  - Comprehensive Metobolic Panel ([facility])    HTN (hypertension), benign  Pt meets criteria for HTN, will start lisinopril, I reviewed the risks, benefits, and possible side effects of the medication.  The patient had an opportunity to ask any questions regarding the treatment plan. The patient was encouraged to call my office if any problems. Check blood pressure readings outside of the clinic several times per week, write down values, and follow up if elevated within the next several weeks. Blood pressure can be checked at the firestation, drugstore,  or any valid site.   - lisinopril (ZESTRIL) 10 MG tablet  Dispense: 90 tablet; Refill: 1  - Comprehensive Metobolic Panel (BFP)    Mixed simple and mucopurulent chronic bronchitis (H): spirometry  1-20  FVC&FEV1 = 1%  Stable, has paralyzed hemidiaphragm per [place]    Hemidiaphragm paralysis      NOEMI (obstructive sleep apnea)  cpap apparently will not help with paralyzed diahpragm    Fatigue, unspecified type  differential diagnosis includes anemia, hypothyroidism, depression, sleep problems (apnea), chronic illness, low testosterone,  and medications     - HEMOGRAM PLATELET DIFF (B[facility])  - TSH WITH FREE T4 REFLEX  "([facility])      Review of external notes as documented elsewhere in note  Review of the result(s) of each unique test - labs  Ordering of each unique test  Prescription drug management         BMI:   Estimated body mass index is 35.55 kg/m  as calculated from the following:    Height as of 3/17/22: 1.702 m (5' 7\").    Weight as of this encounter: 103 kg (227 lb).   Weight management plan: Discussed healthy diet and exercise guidelines    FUTURE APPOINTMENTS:       - Follow-up visit in 6 mo  Work on weight loss  Regular exercise    No follow-ups on file.    Diego Guerra MD  [facility]    Subjective   Donis is a 62 year old, presenting for the following health issues:  Recheck Medication (Fasting medication check and review)      HPI     Diabetes Follow-up      How often are you checking your blood sugar? Not at all    What concerns do you have today about your diabetes? None     Do you have any of these symptoms? (Select all that apply)  No numbness or tingling in feet.  No redness, sores or blisters on feet.  No complaints of excessive thirst.  No reports of blurry vision.  No significant changes to weight.    Have you had a diabetic eye exam in the last 12 months? No                Hyperlipidemia Follow-Up      Are you regularly taking any medication or supplement to lower your cholesterol?   No    Are you having muscle aches or other side effects that you think could be caused by your cholesterol lowering medication?  No    Hypertension Follow-up-new diagnosis based on multiple elevated readings      Do you check your blood pressure regularly outside of the clinic? Yes     Are you following a low salt diet? No    Are your blood pressures ever more than 140 on the top number (systolic) OR more   than 90 on the bottom number (diastolic), for example 140/90? Yes    BP Readings from Last 2 Encounters:   09/09/22 (!) 128/96   03/17/22 124/84     Hemoglobin A1C (%)   Date Value   03/17/2022 " 7.5 (A)   11/23/2021 8.5 (A)     LDL Cholesterol Calculated (mg/dL)   Date Value   02/02/2019 117 (H)   05/24/2013 157 (H)     LDL Cholesterol Direct (mg/dL)   Date Value   11/23/2021 121       COPD Follow-Up    Overall, how are your COPD symptoms since your last clinic visit?  No change    How much fatigue or shortness of breath do you have when you are walking?  Same as usual    How much shortness of breath do you have when you are resting?  Same as usual    How often do you cough? Rarely    Have you noticed any change in your sputum/phlegm?  No    Have you experienced a recent fever? No    Please describe how far you can walk without stopping to rest:  2-5 blocks    How many flights of stairs are you able to walk up without stopping?  2    Have you had any Emergency Room Visits, Urgent Care Visits, or Hospital Admissions because of your COPD since your last office visit?  No    History   Smoking Status     Never Smoker   Smokeless Tobacco     Former User     Comment: passive user     Lab Results   Component Value Date    FEV1 0.17 01/09/2020    [id number] 79 01/09/2020         How many servings of fruits and vegetables do you eat daily?  2-3    On average, how many sweetened beverages do you drink each day (Examples: soda, juice, sweet tea, etc.  Do NOT count diet or artificially sweetened beverages)?   1    How many days per week do you exercise enough to make your heart beat faster? 7    How many minutes a day do you exercise enough to make your heart beat faster? 60 or more    How many days per week do you miss taking your medication? 0    NOEMI- not on cpap as told paralyzed lung is issue    Review of Systems   Constitutional, HEENT, cardiovascular, pulmonary, gi and gu systems are negative, except as otherwise noted.      Objective    BP (!) 128/96 (BP Location: Right arm, Patient Position: Sitting, Cuff Size: Adult Large)   Pulse 79   Temp 97.8  F (36.6  C) (Temporal)   Resp 20   Wt 103 kg (227 lb)   SpO2  96%   BMI 35.55 kg/m    Body mass index is 35.55 kg/m .  Physical Exam   GENERAL: healthy, alert and no distress  EYES: Eyes grossly normal to inspection, PERRL and conjunctivae and sclerae normal  HENT: ear canals and TM's normal, nose and mouth without ulcers or lesions  NECK: no adenopathy, no asymmetry, masses, or scars and thyroid normal to palpation  RESP: lungs clear to auscultation - no rales, rhonchi or wheezes  CV: regular rate and rhythm, normal S1 S2, no S3 or S4, no murmur, click or rub, no peripheral edema and peripheral pulses strong  ABDOMEN: soft, nontender, no hepatosplenomegaly, no masses and bowel sounds normal  MS: no gross musculoskeletal defects noted, no edema  PSYCH: mentation appears normal, affect normal/bright  Diabetic foot exam: normal DP and PT pulses, no trophic changes or ulcerative lesions and normal sensory exam    Results for orders placed or performed in visit on 09/09/22 (from the past 24 hour(s))   HEMOGLOBIN A1C (BFP)   Result Value Ref Range    Hemoglobin A1C 7.8 (A) 4.0 - 7.0 %

## 2022-09-10 LAB — TSH SERPL-ACNC: 1.11 MIU/L (ref 0.4–4.5)

## 2023-03-06 ENCOUNTER — OFFICE VISIT (OUTPATIENT)
Dept: FAMILY MEDICINE | Facility: CLINIC | Age: 63
End: 2023-03-06

## 2023-03-06 VITALS
RESPIRATION RATE: 20 BRPM | HEIGHT: 67 IN | BODY MASS INDEX: 36.47 KG/M2 | TEMPERATURE: 97.1 F | WEIGHT: 232.4 LBS | DIASTOLIC BLOOD PRESSURE: 88 MMHG | SYSTOLIC BLOOD PRESSURE: 118 MMHG | HEART RATE: 72 BPM

## 2023-03-06 DIAGNOSIS — G47.33 OSA (OBSTRUCTIVE SLEEP APNEA): ICD-10-CM

## 2023-03-06 DIAGNOSIS — J41.8 MIXED SIMPLE AND MUCOPURULENT CHRONIC BRONCHITIS (H): ICD-10-CM

## 2023-03-06 DIAGNOSIS — Z12.5 SPECIAL SCREENING FOR MALIGNANT NEOPLASM OF PROSTATE: ICD-10-CM

## 2023-03-06 DIAGNOSIS — I10 HTN (HYPERTENSION), BENIGN: ICD-10-CM

## 2023-03-06 DIAGNOSIS — E11.9 TYPE 2 DIABETES MELLITUS WITHOUT COMPLICATION, WITHOUT LONG-TERM CURRENT USE OF INSULIN (H): Primary | ICD-10-CM

## 2023-03-06 DIAGNOSIS — E66.01 MORBID OBESITY (H): ICD-10-CM

## 2023-03-06 DIAGNOSIS — J98.6 HEMIDIAPHRAGM PARALYSIS: ICD-10-CM

## 2023-03-06 DIAGNOSIS — E78.2 MIXED HYPERLIPIDEMIA: ICD-10-CM

## 2023-03-06 LAB
ALBUMIN (URINE) MG/L: 10
ALBUMIN SERPL-MCNC: 4.7 G/DL (ref 3.6–5.1)
ALBUMIN URINE MG/G CR: <30 MG/G CREATININE
ALBUMIN/GLOB SERPL: 2 {RATIO} (ref 1–2.5)
ALP SERPL-CCNC: 58 U/L (ref 33–130)
ALT 1742-6: 19 U/L (ref 0–32)
AST 1920-8: 16 U/L (ref 0–35)
BILIRUB SERPL-MCNC: 0.6 MG/DL (ref 0.2–1.2)
BUN SERPL-MCNC: 19 MG/DL (ref 7–25)
BUN/CREATININE RATIO: 18.3 (ref 6–22)
CALCIUM SERPL-MCNC: 9.2 MG/DL (ref 8.6–10.3)
CHLORIDE SERPLBLD-SCNC: 98.9 MMOL/L (ref 98–110)
CHOLEST SERPL-MCNC: 218 MG/DL (ref 0–199)
CHOLEST/HDLC SERPL: 5 {RATIO} (ref 0–5)
CO2 SERPL-SCNC: 28.4 MMOL/L (ref 20–32)
CREAT SERPL-MCNC: 1.04 MG/DL (ref 0.6–1.3)
CREATININE URINE MG/DL: 50 MG/DL
GLOBULIN, CALCULATED - QUEST: 2.4 (ref 1.9–3.7)
GLUCOSE SERPL-MCNC: 206 MG/DL (ref 60–99)
HBA1C MFR BLD: 7.9 % (ref 4–7)
HDLC SERPL-MCNC: 47 MG/DL (ref 40–150)
LDLC SERPL CALC-MCNC: 130 MG/DL (ref 0–130)
POTASSIUM SERPL-SCNC: 5.4 MMOL/L (ref 3.5–5.3)
PROT SERPL-MCNC: 7.1 G/DL (ref 6.1–8.1)
SODIUM SERPL-SCNC: 135.2 MMOL/L (ref 135–146)
TRIGL SERPL-MCNC: 203 MG/DL (ref 0–149)

## 2023-03-06 PROCEDURE — 36415 COLL VENOUS BLD VENIPUNCTURE: CPT | Performed by: FAMILY MEDICINE

## 2023-03-06 PROCEDURE — 82043 UR ALBUMIN QUANTITATIVE: CPT | Performed by: FAMILY MEDICINE

## 2023-03-06 PROCEDURE — 82570 ASSAY OF URINE CREATININE: CPT | Performed by: FAMILY MEDICINE

## 2023-03-06 PROCEDURE — 99214 OFFICE O/P EST MOD 30 MIN: CPT | Performed by: FAMILY MEDICINE

## 2023-03-06 PROCEDURE — 83036 HEMOGLOBIN GLYCOSYLATED A1C: CPT | Performed by: FAMILY MEDICINE

## 2023-03-06 PROCEDURE — 80053 COMPREHEN METABOLIC PANEL: CPT | Performed by: FAMILY MEDICINE

## 2023-03-06 PROCEDURE — 80061 LIPID PANEL: CPT | Performed by: FAMILY MEDICINE

## 2023-03-06 RX ORDER — METFORMIN HCL 500 MG
2000 TABLET, EXTENDED RELEASE 24 HR ORAL
Qty: 360 TABLET | Refills: 1 | Status: SHIPPED | OUTPATIENT
Start: 2023-03-06 | End: 2023-03-28

## 2023-03-06 RX ORDER — LISINOPRIL 10 MG/1
10 TABLET ORAL DAILY
Qty: 90 TABLET | Refills: 1 | Status: SHIPPED | OUTPATIENT
Start: 2023-03-06 | End: 2023-06-01

## 2023-03-06 NOTE — NURSING NOTE
Donis Barraza is here for a diabetic check and medication refill.    Questioned patient about current smoking habits.  Pt. has never smoked.  Body mass index is 36.4 kg/m .  PULSE regular  My Chart: declines  CLASSIFICATION OF OVERWEIGHT AND OBESITY BY BMI                        Obesity Class           BMI(kg/m2)  Underweight                                    < 18.5  Normal                                         18.5-24.9  Overweight                                     25.0-29.9  OBESITY                     I                  30.0-34.9                             II                 35.0-39.9  EXTREME OBESITY             III                >40                            Patient's  BMI Body mass index is 36.4 kg/m .  http://hin.nhlbi.nih.gov/menuplanner/menu.cgi    Pre-visit planning  Immunizations - up to date  Colonoscopy - is up to date  Mammogram -   Asthma -   PHQ9 -    DORCAS-7 -      The patient has verbalized that it is ok to leave a detailed voice message on the patient's cell phone with results/recommendations from this visit.

## 2023-03-06 NOTE — PROGRESS NOTES
"  Assessment & Plan     Type 2 diabetes mellitus without complication, without long-term current use of insulin (H)  suboptimal control at max dos emetformin- pt also struggling to lose weight.  I am recommending he see MTM to consider newer options which can also help weight loss, some abd discomfort at times-? Mild gastroparesis    See MTM  - VENOUS COLLECTION  - HEMOGLOBIN A1C (BFP)  - metFORMIN (GLUCOPHAGE XR) 500 MG 24 hr tablet  Dispense: 360 tablet; Refill: 1  - Comprehensive Metobolic Panel (BFP)  - ALBUMIN RANDOM URINE QUANTITATIVE (BFP)    Mixed hyperlipidemia  Control uncertain, Continue to work on healthy diet and exercise, discussed healthy habits   - Lipid Panel (BFP)  - Comprehensive Metobolic Panel (BFP)    HTN (hypertension), benign  Well controlled with addition lisinopril, continue current medications at current doses   - lisinopril (ZESTRIL) 10 MG tablet  Dispense: 90 tablet; Refill: 1  - Comprehensive Metobolic Panel (BFP)    Mixed simple and mucopurulent chronic bronchitis (H): spirometry  1-20  FVC&FEV1 = 1%  Stable, no meds    NOEMI (obstructive sleep apnea)  Not using cpap as won't help with paralyzed hemidiaphragm    Hemidiaphragm paralysis      Special screening for malignant neoplasm of prostate        Review of the result(s) of each unique test - labs  Ordering of each unique test  Prescription drug management         BMI:   Estimated body mass index is 36.4 kg/m  as calculated from the following:    Height as of this encounter: 1.702 m (5' 7\").    Weight as of this encounter: 105.4 kg (232 lb 6.4 oz).   Weight management plan: Discussed healthy diet and exercise guidelines    FUTURE APPOINTMENTS:       - Follow-up visit in 3-6 mo, sooner with MTM  Work on weight loss  Regular exercise    No follow-ups on file.    Diego Guerra MD  [facility]    Dalton Dykes is a 62 year old, presenting for the following health issues:  No chief complaint on " file.      HPI     Diabetes Follow-up-metformin increased last visit      How often are you checking your blood sugar? Not at all    What concerns do you have today about your diabetes? None     Do you have any of these symptoms? (Select all that apply)  No numbness or tingling in feet.  No redness, sores or blisters on feet.  No complaints of excessive thirst.  No reports of blurry vision.  No significant changes to weight.    Have you had a diabetic eye exam in the last 12 months? No          Hyperlipidemia Follow-Up      Are you regularly taking any medication or supplement to lower your cholesterol?   No    Are you having muscle aches or other side effects that you think could be caused by your cholesterol lowering medication?  No    Hypertension [id number]-tztnkhf on lisinopril last visit      Do you check your blood pressure regularly outside of the clinic? Yes     Are you following a low salt diet? No    Are your blood pressures ever more than 140 on the top number (systolic) OR more   than 90 on the bottom number (diastolic), for example 140/90? No    BP Readings from Last 2 Encounters:   03/06/23 118/88   09/09/22 (!) 128/96     Hemoglobin A1C (%)   Date Value   09/09/2022 7.8 (A)   03/17/2022 7.5 (A)     LDL Cholesterol Calculated (mg/dL)   Date Value   02/02/2019 117 (H)   05/24/2013 157 (H)     LDL Cholesterol Direct (mg/dL)   Date Value   09/09/2022 144 (A)   11/23/2021 121       COPD Follow-Up    Overall, how are your COPD symptoms since your last clinic visit?  No change    How much fatigue or shortness of breath do you have when you are walking?  Same as usual    How much shortness of breath do you have when you are resting?  Same as usual    How often do you cough? Rarely    Have you noticed any change in your sputum/phlegm?  No    Have you experienced a recent fever? No    Please describe how far you can walk without stopping to rest:  1-2 miles    How many flights of stairs are you able to walk up  "without stopping?  1    Have you had any Emergency Room Visits, Urgent Care Visits, or Hospital Admissions because of your COPD since your last office visit?  No    History   Smoking Status     Never   Smokeless Tobacco     Former     Lab Results   Component Value Date    FEV1 0.17 01/09/2020    UUI4TLS 79 01/09/2020       NOEMI- not using CPAP due to paralyzed hemidiaphragm    How many servings of fruits and vegetables do you eat daily?  2-3    On average, how many sweetened beverages do you drink each day (Examples: soda, juice, sweet tea, etc.  Do NOT count diet or artificially sweetened beverages)?   1    How many days per week do you exercise enough to make your heart beat faster? 5    How many minutes a day do you exercise enough to make your heart beat faster? 20 - 29    How many days per week do you miss taking your medication? 0        Review of Systems   Constitutional, HEENT, cardiovascular, pulmonary, gi and gu systems are negative, except as otherwise noted.      Objective    /88 (BP Location: Right arm, Patient Position: Chair, Cuff Size: Adult Large)   Pulse 72   Temp 97.1  F (36.2  C) (Temporal)   Resp 20   Ht 1.702 m (5' 7\")   Wt 105.4 kg (232 lb 6.4 oz)   BMI 36.40 kg/m    Body mass index is 36.4 kg/m .  Physical Exam   GENERAL: healthy, alert and no distress  EYES: Eyes grossly normal to inspection, PERRL and conjunctivae and sclerae normal  HENT: ear canals and TM's normal, nose and mouth without ulcers or lesions  NECK: no adenopathy, no asymmetry, masses, or scars and thyroid normal to palpation  RESP: lungs clear to auscultation - no rales, rhonchi or wheezes  CV: regular rate and rhythm, normal S1 S2, no S3 or S4, no murmur, click or rub, no peripheral edema and peripheral pulses strong  ABDOMEN: soft, nontender, no hepatosplenomegaly, no masses and bowel sounds normal  MS: no gross musculoskeletal defects noted, no edema  NEURO: Normal strength and tone, mentation intact and speech " normal  PSYCH: mentation appears normal, affect normal/bright    Results for orders placed or performed in visit on 03/06/23 (from the past 24 hour(s))   HEMOGLOBIN A1C (BFP)   Result Value Ref Range    Hemoglobin A1C 7.9 (A) 4.0 - 7.0 %

## 2023-03-06 NOTE — LETTER
March 6, 2023      Donis Barraza  [address] [address]        Dear ,    We are writing to inform you of your test results.      The results of your recent Kidney Tests, Liver Panel, and Urinalysis were within normal limits.     Lipids remain higher than desired -not sure if you want to try a milder statin or not-let me know. The results are now released on My Chart. Please contact me if you have further questions or concerns.    Take care,    CHANNING Guerra M.D.    Resulted Orders   HEMOGLOBIN A1C (BFP)   Result Value Ref Range    Hemoglobin A1C 7.9 (A) 4.0 - 7.0 %   Lipid Panel (BFP)   Result Value Ref Range    Cholesterol 218 (A) 0 - 199 mg/dL    Triglycerides 203 (A) 0 - 149 mg/dL    HDL Cholesterol 47 40 - 150 mg/dL    LDL Cholesterol Direct 130 0 - 130 mg/dL    Cholesterol/HDL Ratio 5 0 - 5   Comprehensive Metobolic Panel (BFP)   Result Value Ref Range    Carbon Dioxide 28.4 20 - 32 mmol/L    Creatinine 1.04 0.60 - 1.30 mg/dL    Glucose 206 (A) 60 - 99 mg/dL    Sodium 135.2 135 - 146 mmol/L    Potassium 5.4 (A) 3.5 - 5.3 mmol/L    Chloride 98.9 98 - 110 mmol/L    Protein Total 7.1 6.1 - 8.1 g/dL    Albumin 4.7 3.6 - 5.1 g/dL    Alkaline Phosphatase 58 33 - 130 U/L    ALT 19 0 - 32 U/L    AST 16 0 - 35 U/L    Bilirubin Total 0.6 0.2 - 1.2 mg/dL    Urea Nitrogen 19 7 - 25 mg/dL    Calcium 9.2 8.6 - 10.3 mg/dL    BUN/Creatinine Ratio 18.3 6 - 22    Globulin Calculated 2.4 1.9 - 3.7    A/G Ratio 2.0 1 - 2.5   ALBUMIN RANDOM URINE QUANTITATIVE (BFP)   Result Value Ref Range    Albumin mg/L 10 30    Creatinine Urine mg/dL 50 300 mg/dL    Albumin Urine mg/g Cr <30 30 MG/G Creatinine       If you have any questions or concerns, please call the clinic at the number listed above.       Sincerely,      Diego Guerra MD

## 2023-03-07 ENCOUNTER — TRANSFERRED RECORDS (OUTPATIENT)
Dept: FAMILY MEDICINE | Facility: CLINIC | Age: 63
End: 2023-03-07

## 2023-03-16 ENCOUNTER — OFFICE VISIT (OUTPATIENT)
Dept: FAMILY MEDICINE | Facility: CLINIC | Age: 63
End: 2023-03-16

## 2023-03-16 DIAGNOSIS — E11.9 TYPE 2 DIABETES MELLITUS WITHOUT COMPLICATION, WITHOUT LONG-TERM CURRENT USE OF INSULIN (H): Primary | ICD-10-CM

## 2023-03-16 NOTE — PROGRESS NOTES
SUBJECTIVE / OBJECTIVE:                                                Donis Barraza is a 62 year old male seen for an initial visit for Medication Therapy Management.  He was referred to me by Dr. Diego Guerra.     REASON FOR MTM REFERRAL: medication management services     PATIENT CHIEF COMPLAINT/CONCERN: diabetes control    PAST MEDICAL HISTORY: Reviewed in chart    MEDICAL CONDITIONS REVIEWED:    diabetes mellitus-type 2    Current Outpatient Medications   Medication Sig Dispense Refill     lisinopril (ZESTRIL) 10 MG tablet Take 1 tablet (10 mg) by mouth daily 90 tablet 1     metFORMIN (GLUCOPHAGE XR) 500 MG 24 hr tablet Take 4 tablets (2,000 mg) by mouth daily (with dinner) 360 tablet 1       Current labs include:  BP Readings from Last 3 Encounters:   03/06/23 118/88   09/09/22 (!) 128/96   03/17/22 124/84       There were no vitals taken for this visit.    Most Recent Immunizations   Administered Date(s) Administered     TD (ADULT, 7+) 01/01/1999     TDAP Vaccine (Adacel) 01/01/2014       ASSESSMENT / PLAN:                                                       Diabetes:  Assessment: Suboptimal control of diabetes with max dose of metformin. Patient also expresses metformin is causing stomachache after taking. Currently is taking all 4 tablets together at dinnertime. I recommend when starting Rybelsus patient decreases metformin to 3 tabs daily to see if this helps stomach upset. Discussed splitting into two doses, however with patients diet, likely will experience stomach upset as he only consistently eats dinner.    Patient states does not eat breakfast regularly and rarely eats lunch. He has evening meals heavy in vegetables and protein, limited carbs, however he does enjoy sweets. He also notes that he typically gets hungrier about 15 minutes after eating.    We discussed initiation of GLP-1 RA to help with diabetes control and weight loss. Patient is willing to start a new medication, however  he strongly opposes an injectable medication. Will start Rybelsus at 3mg daily and increase as tolerated.      Patient also stated that he is having trouble with insomnia and mood. Recommended making an additional appointment with Dr. Guerra.     Status: Diabetes control suboptimal    Drug Therapy Problems:  1) Additional medication warranted    Plan:  1) Start Rybelsus at 3mg daily. Follow up in one month and increase to 7mg if tolerating well.      I spent 1 hour with this patient today.  All changes were made via collaborative practice agreement with Diego Guerra. A copy of the visit note was provided to the patient's primary care provider.    Phuong Hernandez, PharmD  Medication Therapy Management Pharmacist  [facility]  Office Phone: [phone]

## 2023-03-28 ENCOUNTER — OFFICE VISIT (OUTPATIENT)
Dept: FAMILY MEDICINE | Facility: CLINIC | Age: 63
End: 2023-03-28

## 2023-03-28 VITALS
BODY MASS INDEX: 36.35 KG/M2 | HEART RATE: 84 BPM | TEMPERATURE: 97.6 F | SYSTOLIC BLOOD PRESSURE: 124 MMHG | DIASTOLIC BLOOD PRESSURE: 82 MMHG | HEIGHT: 67 IN | WEIGHT: 231.6 LBS | RESPIRATION RATE: 20 BRPM

## 2023-03-28 DIAGNOSIS — F43.23 ADJUSTMENT DISORDER WITH MIXED ANXIETY AND DEPRESSED MOOD: Primary | ICD-10-CM

## 2023-03-28 DIAGNOSIS — E11.9 TYPE 2 DIABETES MELLITUS WITHOUT COMPLICATION, WITHOUT LONG-TERM CURRENT USE OF INSULIN (H): ICD-10-CM

## 2023-03-28 PROCEDURE — 99214 OFFICE O/P EST MOD 30 MIN: CPT | Performed by: FAMILY MEDICINE

## 2023-03-28 RX ORDER — METFORMIN HCL 500 MG
1500 TABLET, EXTENDED RELEASE 24 HR ORAL
Qty: 360 TABLET | Refills: 1 | COMMUNITY
Start: 2023-03-28 | End: 2023-06-01

## 2023-03-28 RX ORDER — ESCITALOPRAM OXALATE 10 MG/1
10 TABLET ORAL DAILY
Qty: 30 TABLET | Refills: 1 | Status: SHIPPED | OUTPATIENT
Start: 2023-03-28 | End: 2023-05-26

## 2023-03-28 ASSESSMENT — PATIENT HEALTH QUESTIONNAIRE - PHQ9
5. POOR APPETITE OR OVEREATING: MORE THAN HALF THE DAYS
SUM OF ALL RESPONSES TO PHQ QUESTIONS 1-9: 12

## 2023-03-28 ASSESSMENT — ANXIETY QUESTIONNAIRES
5. BEING SO RESTLESS THAT IT IS HARD TO SIT STILL: NOT AT ALL
7. FEELING AFRAID AS IF SOMETHING AWFUL MIGHT HAPPEN: SEVERAL DAYS
6. BECOMING EASILY ANNOYED OR IRRITABLE: SEVERAL DAYS
IF YOU CHECKED OFF ANY PROBLEMS ON THIS QUESTIONNAIRE, HOW DIFFICULT HAVE THESE PROBLEMS MADE IT FOR YOU TO DO YOUR WORK, TAKE CARE OF THINGS AT HOME, OR GET ALONG WITH OTHER PEOPLE: NOT DIFFICULT AT ALL
1. FEELING NERVOUS, ANXIOUS, OR ON EDGE: MORE THAN HALF THE DAYS
GAD7 TOTAL SCORE: 10
GAD7 TOTAL SCORE: 10
2. NOT BEING ABLE TO STOP OR CONTROL WORRYING: SEVERAL DAYS
3. WORRYING TOO MUCH ABOUT DIFFERENT THINGS: NEARLY EVERY DAY

## 2023-03-28 NOTE — NURSING NOTE
Donis Barraza is here for a consult for anxiety/depression    Questioned patient about current smoking habits.  Pt. has never smoked.  PULSE regular  My Chart:   CLASSIFICATION OF OVERWEIGHT AND OBESITY BY BMI                        Obesity Class           BMI(kg/m2)  Underweight                                    < 18.5  Normal                                         18.5-24.9  Overweight                                     25.0-29.9  OBESITY                     I                  30.0-34.9                             II                 35.0-39.9  EXTREME OBESITY             III                >40                            Patient's  BMI Body mass index is 36.27 kg/m .  http://hin.nhlbi.nih.gov/menuplanner/menu.cgi  Pre-visit planning  Immunizations - discussed  Colonoscopy - UTD  Mammogram -   Asthma -   PHQ9 -    DORCAS-7 -      The patient has verbalized that it is ok to leave a detailed voice message on the patient's cell phone with results/recommendations from this visit.

## 2023-03-28 NOTE — PROGRESS NOTES
Assessment & Plan     Adjustment disorder with mixed anxiety and depressed mood  Pt meets criteria for DORCAS and depression, discussed options including healthy habits (exercise, sleep hygiene, healthy diet, meditation/stress reduction), therapy, medications including but not limited to selective serotonin reuptake inhibitor's     We discussed the biochemical nature of mood disorders. Possible increased risk of suicidality with antidepressants discussed. No harm verbal contract agreed upon. I would like patient to follow up in weeks after medication has had a chance to take effect.     We discussed the new findings of possibly increased suicidal risk in teens and young adults beginning treatment with SSRI's. Pt is aware of risk and will seek help immediately if develops any suicidal thoughts.       Type 2 diabetes mellitus without complication, without long-term current use of insulin (H)    - metFORMIN (GLUCOPHAGE XR) 500 MG 24 hr tablet  Dispense: 360 tablet; Refill: 1      Review of the result(s) of each unique test - mood screeners  Ordering of each unique test  Prescription drug management  32 minutes spent by me on the date of the encounter doing chart review, review of test results, interpretation of tests, patient visit and documentation        FUTURE APPOINTMENTS:       - Follow-up visit in 2 mo  Work on weight loss  Regular exercise    No follow-ups on file.    Diego Guerra MD  [facility]    Subjective   Donis is a 63 year old, presenting for the following health issues:  Consult  No flowsheet data found.  HPI     Abnormal Mood Symptoms  Onset/Duration: months  Description:   Depression (if yes, do PHQ-9): YES  Anxiety (if yes, do DORCAS-7): YES  Accompanying Signs & Symptoms:  Still participating in activities that you used to enjoy: YES  Fatigue: YES  Irritability: YES  Difficulty concentrating: YES  Changes in appetite: YES  Problems with sleep: YES  Heart racing/beating  "fast: No  Abnormally elevated, expansive, or irritable mood: No  Persistently increased activity or energy: No  Thoughts of hurting yourself or others: No  History:  Recent stress or major life event: YES- breast friend passed last year from covid  Prior depression or anxiety: None  Family history of depression or anxiety: No  Alcohol/drug use: No  Difficulty sleeping: YES  Precipitating or alleviating factors: None  Therapies tried and outcome: none  No flowsheet data found.  No flowsheet data found.          Review of Systems   Constitutional, HEENT, cardiovascular, pulmonary, gi and gu systems are negative, except as otherwise noted.      Objective    /82 (BP Location: Right arm, Patient Position: Chair, Cuff Size: Adult Large)   Pulse 84   Temp 97.6  F (36.4  C) (Temporal)   Resp 20   Ht 1.702 m (5' 7\")   Wt 105.1 kg (231 lb 9.6 oz)   BMI 36.27 kg/m    Body mass index is 36.27 kg/m .  Physical Exam   GENERAL: healthy, alert and no distress  NECK: no adenopathy, no asymmetry, masses, or scars and thyroid normal to palpation  RESP: lungs clear to auscultation - no rales, rhonchi or wheezes  CV: regular rate and rhythm, normal S1 S2, no S3 or S4, no murmur, click or rub, no peripheral edema and peripheral pulses strong  PSYCH: mentation appears normal, affect normal/bright and tearful    Mood screeners done                  "

## 2023-04-07 ENCOUNTER — TELEPHONE (OUTPATIENT)
Dept: FAMILY MEDICINE | Facility: CLINIC | Age: 63
End: 2023-04-07

## 2023-04-07 DIAGNOSIS — E11.9 TYPE 2 DIABETES MELLITUS WITHOUT COMPLICATION, WITHOUT LONG-TERM CURRENT USE OF INSULIN (H): Primary | ICD-10-CM

## 2023-04-07 NOTE — TELEPHONE ENCOUNTER
Patient is doing quite well with addition of Rybelsus. He has noticed that hunger has decreased and has had no notable side effects.    Will taper up to 7mg tablets. I will follow up with patient in 4 weeks to see how he is doing and we will determine if further taper is necessary at that time.    Phuong Hernandez, PharmD  Clinical Pharmacist  [facility]  [facility] Phone: [phone]  Direct Office Phone: [phone]

## 2023-04-17 NOTE — DISCHARGE INSTRUCTIONS
Keep boot on and use crutches to walk  Motrin 600mg three times a day with food  Follow up with your orthopedic doctor in a few days    
[FreeTextEntry1] : 52yo female presents for initial evaluation\par \par Referred by GI Dr Trevizo for hemorrhoids.\par \par Per chart review, recently diagnosed with rheumatoid arthritis, on methotrexate and hydroxychloroquine and folic acid supplement.\par Rheum previously Dr Ornelas; most recent visit Nov 2022, discussed starting a biologic at that visit but patient did not want to start medication and never started. \par She now follows with Dr Dawkins at [facility]. \par H/o Hashimoto, hypothyroidism on synthroid. \par \par PSH - fibroid removal\par \par She reports knowing she has had hemorrhoids for about 10 years.\par Hemorrhoid symptoms included BRBPR and pain, worse with constipation. Worse with travel. \par Also reports itching at times.\par Symptoms were infrequent, about once per year. Resolves after a few days. \par Has tried OTC suppositories in past and diet modification. \par \par Screening colonoscopy by Dr Trevizo 1/19/23 noted medium internal and external hemorrhoids.\par This was patient's first colonoscopy. \par \par Most recent episode of hemorrhoid symptoms was 3 months ago. Symptoms lasted longer than prior episodes. \par Used OTC suppositories. \par \par BMs are "generally" daily, denies taking bowel regimen, laxative or stool softener regularly.\par Occasionally takes OTC meds if skips a BM. \par Admits to not drinking enough water on a daily basis.\par Tries to eat a diet that is high in fiber. \par Has installed Welsh toilets/bidets in her home. \par \par No ASA or anticoagulation.

## 2023-05-26 ENCOUNTER — TELEPHONE (OUTPATIENT)
Dept: FAMILY MEDICINE | Facility: CLINIC | Age: 63
End: 2023-05-26

## 2023-05-26 DIAGNOSIS — F43.23 ADJUSTMENT DISORDER WITH MIXED ANXIETY AND DEPRESSED MOOD: ICD-10-CM

## 2023-05-26 RX ORDER — ESCITALOPRAM OXALATE 10 MG/1
TABLET ORAL
Qty: 7 TABLET | Refills: 0 | Status: SHIPPED | OUTPATIENT
Start: 2023-05-26 | End: 2023-06-01

## 2023-05-26 NOTE — TELEPHONE ENCOUNTER
Patient stopped in clinic asking for refill of Lexapro- informed Dr. Guerra wanted to see him for follow up in 2 months, which is now. Patient has 2 pills left-   Did schedule for 6/1/ at 10:30 am    Can refill be approved to get him to visit next week- Aurelia listed on chart is correct. Informed pt it is provider/ clinical discretion to extend medication- pt understands.     Routing to Sheri

## 2023-05-26 NOTE — TELEPHONE ENCOUNTER
Approved  Bernard Ochoa PA-C  [facility]      Can you review for JCC    Donis Barraza is requesting a refill of:    Pending Prescriptions:                       Disp   Refills    escitalopram (LEXAPRO) 10 MG tablet [Phar*7 tabl*0            Sig: TAKE 1 TABLET(10 MG) BY MOUTH DAILY    Pt has OV for refills on 6/1/23

## 2023-06-01 ENCOUNTER — OFFICE VISIT (OUTPATIENT)
Dept: FAMILY MEDICINE | Facility: CLINIC | Age: 63
End: 2023-06-01

## 2023-06-01 VITALS
SYSTOLIC BLOOD PRESSURE: 126 MMHG | HEART RATE: 82 BPM | BODY MASS INDEX: 33.9 KG/M2 | TEMPERATURE: 98.3 F | OXYGEN SATURATION: 98 % | HEIGHT: 67 IN | DIASTOLIC BLOOD PRESSURE: 82 MMHG | WEIGHT: 216 LBS

## 2023-06-01 DIAGNOSIS — F43.23 ADJUSTMENT DISORDER WITH MIXED ANXIETY AND DEPRESSED MOOD: Primary | ICD-10-CM

## 2023-06-01 DIAGNOSIS — E11.9 TYPE 2 DIABETES MELLITUS WITHOUT COMPLICATION, WITHOUT LONG-TERM CURRENT USE OF INSULIN (H): ICD-10-CM

## 2023-06-01 DIAGNOSIS — E78.2 MIXED HYPERLIPIDEMIA: ICD-10-CM

## 2023-06-01 DIAGNOSIS — I10 HTN (HYPERTENSION), BENIGN: ICD-10-CM

## 2023-06-01 DIAGNOSIS — G47.33 OSA (OBSTRUCTIVE SLEEP APNEA): ICD-10-CM

## 2023-06-01 LAB — HBA1C MFR BLD: 6.2 % (ref 4–7)

## 2023-06-01 PROCEDURE — 83036 HEMOGLOBIN GLYCOSYLATED A1C: CPT | Performed by: FAMILY MEDICINE

## 2023-06-01 PROCEDURE — 99214 OFFICE O/P EST MOD 30 MIN: CPT | Performed by: FAMILY MEDICINE

## 2023-06-01 PROCEDURE — 36415 COLL VENOUS BLD VENIPUNCTURE: CPT | Performed by: FAMILY MEDICINE

## 2023-06-01 RX ORDER — LISINOPRIL 10 MG/1
10 TABLET ORAL DAILY
Qty: 90 TABLET | Refills: 1 | Status: SHIPPED | OUTPATIENT
Start: 2023-06-01 | End: 2023-12-05

## 2023-06-01 RX ORDER — SIMVASTATIN 20 MG
20 TABLET ORAL AT BEDTIME
Qty: 90 TABLET | Refills: 1 | Status: SHIPPED | OUTPATIENT
Start: 2023-06-01 | End: 2024-01-10

## 2023-06-01 RX ORDER — ESCITALOPRAM OXALATE 10 MG/1
10 TABLET ORAL DAILY
Qty: 90 TABLET | Refills: 1 | Status: SHIPPED | OUTPATIENT
Start: 2023-06-01 | End: 2023-12-01

## 2023-06-01 RX ORDER — METFORMIN HCL 500 MG
1500 TABLET, EXTENDED RELEASE 24 HR ORAL
Qty: 360 TABLET | Refills: 1 | Status: SHIPPED | OUTPATIENT
Start: 2023-06-01 | End: 2023-12-05

## 2023-06-01 NOTE — NURSING NOTE
Chief Complaint   Patient presents with     Recheck Medication     Fasting today, refill medications      Pre-visit Screening:  Immunizations:  not up to date - shingrix at pharmacy  Colonoscopy:  is up to date  Mammogram: NA  Asthma Action Test/Plan:  NA  PHQ9:  NA  GAD7:  NA  Questioned patient about current smoking habits Pt. has never smoked.  Ok to leave detailed message on voice mail for today's visit only Yes, phone # [phone]

## 2023-06-01 NOTE — PROGRESS NOTES
Assessment & Plan     Adjustment disorder with mixed anxiety and depressed mood  Improved control of anger and irritability, slight sexual side effects , continue current medications at current doses     Type 2 diabetes mellitus without complication, without long-term current use of insulin (H)  Marked improvement with rybelsus, weight loss also great, continue current medications at current doses   - HEMOGLOBIN A1C (BFP)  - VENOUS COLLECTION    Mixed hyperlipidemia  Recommend statin given recent lipids, did not tolerate atorvastatin but willing to try simvastatin    HTN (hypertension), benign  Well controlled, continue current medications at current doses     NOEMI (obstructive sleep apnea)  cpap      Review of the result(s) of each unique test - labs  Ordering of each unique test  Prescription drug management         FUTURE APPOINTMENTS:       - Follow-up visit in 6 mo  Work on weight loss  Regular exercise    No follow-ups on file.    Diego Guerra MD  [facility]    Dalton Dykes is a 63 year old, presenting for the following health issues:  Recheck Medication (Fasting today, refill medications )    HPI     Diabetes Follow-up-now on Rybelsus 7 mg      How often are you checking your blood sugar? Not at all    What concerns do you have today about your diabetes? None     Do you have any of these symptoms? (Select all that apply)  Numbness in feet          Hyperlipidemia Follow-Up      Are you regularly taking any medication or supplement to lower your cholesterol?   No    Are you having muscle aches or other side effects that you think could be caused by your cholesterol lowering medication?  no       Hypertension Follow-up      Do you check your blood pressure regularly outside of the clinic? Yes     Are you following a low salt diet? No    Are your blood pressures ever more than 140 on the top number (systolic) OR more   than 90 on the bottom number (diastolic), for example  140/90? No    BP Readings from Last 2 Encounters:   06/01/23 126/82   03/28/23 124/82     Hemoglobin A1C (%)   Date Value   03/06/2023 7.9 (A)   09/09/2022 7.8 (A)     LDL Cholesterol Calculated (mg/dL)   Date Value   02/02/2019 117 (H)   05/24/2013 157 (H)     LDL Cholesterol Direct (mg/dL)   Date Value   03/06/2023 130   09/09/2022 144 (A)       Depression and Anxiety Follow-Up    How are you doing with your depression since your last visit? Improved     How are you doing with your anxiety since your last visit?  Improved     Are you having other symptoms that might be associated with depression or anxiety? No    Have you had a significant life event? No     Do you have any concerns with your use of alcohol or other drugs? No    Social History     Tobacco Use     Smoking status: Never     Smokeless tobacco: Former     Tobacco comments:     passive user   Substance Use Topics     Alcohol use: Yes     Alcohol/week: 1.0 standard drink of alcohol     Types: 1 Standard drinks or equivalent per week     Comment: very little     Drug use: No         3/28/2023     9:25 AM   PHQ   PHQ-9 Total Score 12   Q9: Thoughts of better off dead/self-harm past 2 weeks Not at all         3/28/2023     9:25 AM   DORCAS-7 SCORE   Total Score 10     Mood screens    Suicide Assessment Five-step Evaluation and Treatment (SAFE-T)      How many servings of fruits and vegetables do you eat daily?  2-3    On average, how many sweetened beverages do you drink each day (Examples: soda, juice, sweet tea, etc.  Do NOT count diet or artificially sweetened beverages)?   1    How many days per week do you exercise enough to make your heart beat faster? 5    How many minutes a day do you exercise enough to make your heart beat faster? 20 - 29    How many days per week do you miss taking your medication? 0          Review of Systems   Constitutional, HEENT, cardiovascular, pulmonary, gi and gu systems are negative, except as otherwise noted.     "  Objective    /82 (BP Location: Left arm, Patient Position: Sitting, Cuff Size: Adult Large)   Pulse 82   Temp 98.3  F (36.8  C) (Temporal)   Ht 1.702 m (5' 7\")   Wt 98 kg (216 lb)   SpO2 98%   BMI 33.83 kg/m    Body mass index is 33.83 kg/m .  Physical Exam   GENERAL: healthy, alert and no distress  EYES: Eyes grossly normal to inspection, PERRL and conjunctivae and sclerae normal  HENT: ear canals and TM's normal, nose and mouth without ulcers or lesions  NECK: no adenopathy, no asymmetry, masses, or scars and thyroid normal to palpation  RESP: lungs clear to auscultation - no rales, rhonchi or wheezes  CV: regular rate and rhythm, normal S1 S2, no S3 or S4, no murmur, click or rub, no peripheral edema and peripheral pulses strong  ABDOMEN: soft, nontender, no hepatosplenomegaly, no masses and bowel sounds normal  MS: no gross musculoskeletal defects noted, no edema  PSYCH: mentation appears normal, affect normal/bright    No results found for this or any previous visit (from the past 24 hour(s)).                  "

## 2023-06-04 DIAGNOSIS — F43.23 ADJUSTMENT DISORDER WITH MIXED ANXIETY AND DEPRESSED MOOD: ICD-10-CM

## 2023-06-05 RX ORDER — ESCITALOPRAM OXALATE 10 MG/1
TABLET ORAL
Qty: 7 TABLET | COMMUNITY
Start: 2023-06-05

## 2023-06-05 NOTE — TELEPHONE ENCOUNTER
Donis Barraza is requesting a refill of:    Refused Prescriptions:                       Disp   Refills    escitalopram (LEXAPRO) 10 MG tablet [Pharm*7 tabl*         Sig: TAKE 1 TABLET(10 MG) BY MOUTH DAILY  Refused By: DELMAR WERNER  Reason for Refusal: Should already have refills on file  Reason for Refusal Comment: Refills sent on 06/01/23 for 90 tab with 1 refill

## 2023-10-07 DIAGNOSIS — E11.9 TYPE 2 DIABETES MELLITUS WITHOUT COMPLICATION, WITHOUT LONG-TERM CURRENT USE OF INSULIN (H): ICD-10-CM

## 2023-10-10 RX ORDER — ORAL SEMAGLUTIDE 7 MG/1
TABLET ORAL
Qty: 90 TABLET | Refills: 0 | Status: SHIPPED | OUTPATIENT
Start: 2023-10-10 | End: 2023-12-05

## 2023-10-10 NOTE — TELEPHONE ENCOUNTER
Donis Barraza is requesting a refill of:    Pending Prescriptions:                       Disp   Refills    RYBELSUS 7 MG tablet [Pharmacy Med Name: *90 tab*0            Sig: TAKE 1 TABLET(7 MG) BY MOUTH DAILY    Please close encounter if RX was sent. Thanks, Sheri    Lab Results   Component Value Date    A1C 6.2 06/01/2023    A1C 7.9 03/06/2023    A1C 7.8 09/09/2022    A1C 7.5 03/17/2022    A1C 8.5 11/23/2021

## 2023-11-29 ENCOUNTER — TELEPHONE (OUTPATIENT)
Dept: FAMILY MEDICINE | Facility: CLINIC | Age: 63
End: 2023-11-29

## 2023-11-29 DIAGNOSIS — F43.23 ADJUSTMENT DISORDER WITH MIXED ANXIETY AND DEPRESSED MOOD: ICD-10-CM

## 2023-11-30 RX ORDER — ESCITALOPRAM OXALATE 10 MG/1
10 TABLET ORAL DAILY
COMMUNITY
Start: 2023-11-30

## 2023-11-30 NOTE — TELEPHONE ENCOUNTER
Donis Barraza is requesting a refill of:    Refused Prescriptions:                       Disp   Refills    escitalopram (LEXAPRO) 10 MG tablet [Pharm*                Sig: TAKE 1 TABLET(10 MG) BY MOUTH DAILY  Refused By: DELMAR WERNER  Reason for Refusal: Patient needs appointment    Pt due for FASTING OV

## 2023-12-01 RX ORDER — ESCITALOPRAM OXALATE 10 MG/1
10 TABLET ORAL DAILY
Qty: 7 TABLET | Refills: 0 | Status: SHIPPED | OUTPATIENT
Start: 2023-12-01 | End: 2023-12-11

## 2023-12-01 NOTE — TELEPHONE ENCOUNTER
Pt is scheduled for Tuesday, 12/5 with Dr. Guerra and is out of escitalopram can short supply be called in to [facility] [address]    Routing to Sheri

## 2023-12-01 NOTE — TELEPHONE ENCOUNTER
Donis Barraza is requesting a refill of:    Pending Prescriptions:                       Disp   Refills    escitalopram (LEXAPRO) 10 MG tablet       7 tabl*0            Sig: Take 1 tablet (10 mg) by mouth daily  Refused Prescriptions:                       Disp   Refills    escitalopram (LEXAPRO) 10 MG tablet [Pharm*                Sig: TAKE 1 TABLET(10 MG) BY MOUTH DAILY  Refused By: DELMAR WERNER  Reason for Refusal: Patient needs appointment    Pt has OV on 12/5/23

## 2023-12-05 ENCOUNTER — OFFICE VISIT (OUTPATIENT)
Dept: FAMILY MEDICINE | Facility: CLINIC | Age: 63
End: 2023-12-05

## 2023-12-05 VITALS
HEIGHT: 67 IN | BODY MASS INDEX: 32.18 KG/M2 | SYSTOLIC BLOOD PRESSURE: 114 MMHG | DIASTOLIC BLOOD PRESSURE: 80 MMHG | TEMPERATURE: 97.9 F | WEIGHT: 205 LBS | HEART RATE: 80 BPM

## 2023-12-05 DIAGNOSIS — F43.23 ADJUSTMENT DISORDER WITH MIXED ANXIETY AND DEPRESSED MOOD: ICD-10-CM

## 2023-12-05 DIAGNOSIS — Z13.89 SCREENING FOR DIABETIC PERIPHERAL NEUROPATHY: ICD-10-CM

## 2023-12-05 DIAGNOSIS — E78.2 MIXED HYPERLIPIDEMIA: ICD-10-CM

## 2023-12-05 DIAGNOSIS — G47.33 OSA (OBSTRUCTIVE SLEEP APNEA): ICD-10-CM

## 2023-12-05 DIAGNOSIS — E11.9 TYPE 2 DIABETES MELLITUS WITHOUT COMPLICATION, WITHOUT LONG-TERM CURRENT USE OF INSULIN (H): Primary | ICD-10-CM

## 2023-12-05 DIAGNOSIS — I10 HTN (HYPERTENSION), BENIGN: ICD-10-CM

## 2023-12-05 LAB
ALBUMIN SERPL-MCNC: 4.7 G/DL (ref 3.6–5.1)
ALBUMIN/GLOB SERPL: 1.9 {RATIO} (ref 1–2.5)
ALP SERPL-CCNC: 52 U/L (ref 33–130)
ALT 1742-6: 13 U/L (ref 0–32)
AST 1920-8: 14 U/L (ref 0–35)
BILIRUB SERPL-MCNC: 0.5 MG/DL (ref 0.2–1.2)
BUN SERPL-MCNC: 20 MG/DL (ref 7–25)
BUN/CREATININE RATIO: 20.4 (ref 6–32)
CALCIUM SERPL-MCNC: 9.5 MG/DL (ref 8.6–10.3)
CHLORIDE SERPLBLD-SCNC: 102.7 MMOL/L (ref 98–110)
CHOLEST SERPL-MCNC: 233 MG/DL (ref 0–199)
CHOLEST/HDLC SERPL: 4 {RATIO} (ref 0–5)
CO2 SERPL-SCNC: 25.4 MMOL/L (ref 20–32)
CREAT SERPL-MCNC: 0.98 MG/DL (ref 0.6–1.3)
GLOBULIN, CALCULATED - QUEST: 2.5 (ref 1.9–3.7)
GLUCOSE SERPL-MCNC: 132 MG/DL (ref 60–99)
HBA1C MFR BLD: 6.7 % (ref 4–7)
HDLC SERPL-MCNC: 52 MG/DL (ref 40–150)
LDLC SERPL CALC-MCNC: 158 MG/DL (ref 0–130)
POTASSIUM SERPL-SCNC: 5.13 MMOL/L (ref 3.5–5.3)
PROT SERPL-MCNC: 7.2 G/DL (ref 6.1–8.1)
SODIUM SERPL-SCNC: 138 MMOL/L (ref 135–146)
TRIGL SERPL-MCNC: 117 MG/DL (ref 0–149)

## 2023-12-05 PROCEDURE — 80053 COMPREHEN METABOLIC PANEL: CPT | Performed by: FAMILY MEDICINE

## 2023-12-05 PROCEDURE — 99214 OFFICE O/P EST MOD 30 MIN: CPT | Performed by: FAMILY MEDICINE

## 2023-12-05 PROCEDURE — 83036 HEMOGLOBIN GLYCOSYLATED A1C: CPT | Performed by: FAMILY MEDICINE

## 2023-12-05 PROCEDURE — 36415 COLL VENOUS BLD VENIPUNCTURE: CPT | Performed by: FAMILY MEDICINE

## 2023-12-05 PROCEDURE — 80061 LIPID PANEL: CPT | Performed by: FAMILY MEDICINE

## 2023-12-05 RX ORDER — LISINOPRIL 10 MG/1
10 TABLET ORAL DAILY
Qty: 90 TABLET | Refills: 1 | Status: SHIPPED | OUTPATIENT
Start: 2023-12-05 | End: 2024-06-10

## 2023-12-05 RX ORDER — METFORMIN HCL 500 MG
1500 TABLET, EXTENDED RELEASE 24 HR ORAL
Qty: 360 TABLET | Refills: 1 | Status: SHIPPED | OUTPATIENT
Start: 2023-12-05 | End: 2024-06-13

## 2023-12-05 RX ORDER — ORAL SEMAGLUTIDE 7 MG/1
TABLET ORAL
Qty: 90 TABLET | Refills: 1 | Status: SHIPPED | OUTPATIENT
Start: 2023-12-05 | End: 2024-06-10

## 2023-12-05 RX ORDER — SIMVASTATIN 20 MG
20 TABLET ORAL AT BEDTIME
Qty: 90 TABLET | Refills: 1 | Status: CANCELLED | OUTPATIENT
Start: 2023-12-05

## 2023-12-05 NOTE — PROGRESS NOTES
"  Assessment & Plan     Type 2 diabetes mellitus without complication, without long-term current use of insulin (H)  Well controlled overall  - VENOUS COLLECTION  - HEMOGLOBIN A1C (BFP)    Mixed hyperlipidemia  Control likely poor as has not been taking statin    HTN (hypertension), benign  Well controlled, continue current medications at current doses     Screening for diabetic peripheral neuropathy      Adjustment disorder with mixed anxiety and depressed mood  Well controlled, continue current medications at current doses     NOEMI (obstructive sleep apnea)  cpap      Review of the result(s) of each unique test - labs  Ordering of each unique test  Prescription drug management         BMI:   Estimated body mass index is 32.11 kg/m  as calculated from the following:    Height as of this encounter: 1.702 m (5' 7\").    Weight as of this encounter: 93 kg (205 lb).   Weight management plan: Discussed healthy diet and exercise guidelines    FUTURE APPOINTMENTS:       - Follow-up visit in 6 mo  Work on weight loss  Regular exercise    No follow-ups on file.    Diego Guerra MD  [facility]    Dalton Dykes is a 63 year old, presenting for the following health issues:  Recheck Medication    HPI       Diabetes Follow-up, weight loss persists- on Rybelsus and metformin    How often are you checking your blood sugar? Not at all  What concerns do you have today about your diabetes? None   Do you have any of these symptoms? (Select all that apply)  No numbness or tingling in feet.  No redness, sores or blisters on feet.  No complaints of excessive thirst.  No reports of blurry vision.  No significant changes to weight.          Hyperlipidemia Wwtqlk-Hh-xnuscbq simvastatin at last check (did not tolerate atorvastatin) but has been for getting to take    Are you regularly taking any medication or supplement to lower your cholesterol?   Simvastatin as noted  Are you having muscle aches or other " side effects that you think could be caused by your cholesterol lowering medication?  No    Hypertension Follow-up    Do you check your blood pressure regularly outside of the clinic? Yes   Are you following a low salt diet? No  Are your blood pressures ever more than 140 on the top number (systolic) OR more   than 90 on the bottom number (diastolic), for example 140/90? No    BP Readings from Last 2 Encounters:   12/05/23 114/80   06/01/23 126/82     Hemoglobin A1C (%)   Date Value   06/01/2023 6.2   03/06/2023 7.9 (A)     LDL Cholesterol Calculated (mg/dL)   Date Value   02/02/2019 117 (H)   05/24/2013 157 (H)     LDL Cholesterol Direct (mg/dL)   Date Value   03/06/2023 130   09/09/2022 144 (A)         Depression and Anxiety Follow-Up  How are you doing with your depression since your last visit? No change  How are you doing with your anxiety since your last visit?  No change  Are you having other symptoms that might be associated with depression or anxiety? No  Have you had a significant life event? No   Do you have any concerns with your use of alcohol or other drugs? No    Social History     Tobacco Use    Smoking status: Never    Smokeless tobacco: Former    Tobacco comments:     passive user   Substance Use Topics    Alcohol use: Yes     Alcohol/week: 1.0 standard drink of alcohol     Types: 1 Standard drinks or equivalent per week     Comment: very little    Drug use: No         3/28/2023     9:25 AM   PHQ   PHQ-9 Total Score 12   Q9: Thoughts of better off dead/self-harm past 2 weeks Not at all         3/28/2023     9:25 AM   DORCAS-7 SCORE   Total Score 10         3/28/2023     9:25 AM   Last PHQ-9   1.  Little interest or pleasure in doing things 3   2.  Feeling down, depressed, or hopeless 0   3.  Trouble falling or staying asleep, or sleeping too much 2   4.  Feeling tired or having little energy 3   5.  Poor appetite or overeating 2   6.  Feeling bad about yourself 1   7.  Trouble concentrating 1   8.   "Moving slowly or restless 0   Q9: Thoughts of better off dead/self-harm past 2 weeks 0   PHQ-9 Total Score 12   Difficulty at work, home, or with people Not difficult at all         3/28/2023     9:25 AM   DORCAS-7    1. Feeling nervous, anxious, or on edge 2   2. Not being able to stop or control worrying 1   3. Worrying too much about different things 3   4. Trouble relaxing 2   5. Being so restless that it is hard to sit still 0   6. Becoming easily annoyed or irritable 1   7. Feeling afraid, as if something awful might happen 1   DORCAS-7 Total Score 10   If you checked any problems, how difficult have they made it for you to do your work, take care of things at home, or get along with other people? Not difficult at all       Suicide Assessment Five-step Evaluation and Treatment (SAFE-T)    How many servings of fruits and vegetables do you eat daily?  2-3  On average, how many sweetened beverages do you drink each day (Examples: soda, juice, sweet tea, etc.  Do NOT count diet or artificially sweetened beverages)?   1  How many days per week do you exercise enough to make your heart beat faster? 5  How many minutes a day do you exercise enough to make your heart beat faster? 20 - 29  How many days per week do you miss taking your medication? 0        Review of Systems   Constitutional, HEENT, cardiovascular, pulmonary, gi and gu systems are negative, except as otherwise noted.      Objective    /80 (BP Location: Right arm, Patient Position: Chair, Cuff Size: Adult Large)   Pulse 80   Temp 97.9  F (36.6  C) (Temporal)   Ht 1.702 m (5' 7\")   Wt 93 kg (205 lb)   BMI 32.11 kg/m    Body mass index is 32.11 kg/m .  Physical Exam   GENERAL: healthy, alert and no distress  EYES: Eyes grossly normal to inspection, PERRL and conjunctivae and sclerae normal  HENT: ear canals and TM's normal, nose and mouth without ulcers or lesions  NECK: no adenopathy, no asymmetry, masses, or scars and thyroid normal to " palpation  RESP: lungs clear to auscultation - no rales, rhonchi or wheezes  CV: regular rate and rhythm, normal S1 S2, no S3 or S4, no murmur, click or rub, no peripheral edema and peripheral pulses strong  ABDOMEN: soft, nontender, no hepatosplenomegaly, no masses and bowel sounds normal  MS: no gross musculoskeletal defects noted, no edema  PSYCH: mentation appears normal, affect normal/bright  Diabetic foot exam: normal DP and PT pulses, no trophic changes or ulcerative lesions, and normal sensory exam    Results for orders placed or performed in visit on 12/05/23 (from the past 24 hour(s))   HEMOGLOBIN A1C (BFP)   Result Value Ref Range    Hemoglobin A1C 6.7 4.0 - 7.0 %

## 2023-12-05 NOTE — LETTER
December 5, 2023      Donis Barraza  [address] [facility] [address]        Dear ,    We are writing to inform you of your test results.    Liver and kidneys look good. Lipids up but as expected-just get back on statin    Resulted Orders   Comprehensive Metobolic Panel (BFP)   Result Value Ref Range    Carbon Dioxide 25.4 20 - 32 mmol/L    Creatinine 0.98 0.60 - 1.30 mg/dL    Glucose 132 (A) 60 - 99 mg/dL    Sodium 138.0 135 - 146 mmol/L    Potassium 5.13 3.5 - 5.3 mmol/L    Chloride 102.7 98 - 110 mmol/L    Protein Total 7.2 6.1 - 8.1 g/dL    Albumin 4.7 3.6 - 5.1 g/dL    Alkaline Phosphatase 52 33 - 130 U/L    ALT 13 0 - 32 U/L    AST 14 0 - 35 U/L    Bilirubin Total 0.5 0.2 - 1.2 mg/dL    Urea Nitrogen 20 7 - 25 mg/dL    Calcium 9.5 8.6 - 10.3 mg/dL    BUN/Creatinine Ratio 20.4 6 - 32    Globulin Calculated 2.5 1.9 - 3.7    A/G Ratio 1.9 1 - 2.5   Lipid Panel (BFP)   Result Value Ref Range    Cholesterol 233 (A) 0 - 199 mg/dL    Triglycerides 117 0 - 149 mg/dL    HDL Cholesterol 52 40 - 150 mg/dL    LDL Cholesterol Direct 158 (A) 0 - 130 mg/dL    Cholesterol/HDL Ratio 4 0 - 5   HEMOGLOBIN A1C (BFP)   Result Value Ref Range    Hemoglobin A1C 6.7 4.0 - 7.0 %       If you have any questions or concerns, please call the clinic at the number listed above.       Sincerely,      Diego Guerra MD

## 2023-12-05 NOTE — NURSING NOTE
Donis Barraza is here for a diabetic check and medication refill.    Questioned patient about current smoking habits.  Pt. has never smoked.  Body mass index is 32.11 kg/m .  PULSE regular  My Chart: declines  CLASSIFICATION OF OVERWEIGHT AND OBESITY BY BMI                        Obesity Class           BMI(kg/m2)  Underweight                                    < 18.5  Normal                                         18.5-24.9  Overweight                                     25.0-29.9  OBESITY                     I                  30.0-34.9                             II                 35.0-39.9  EXTREME OBESITY             III                >40                            Patient's  BMI Body mass index is 32.11 kg/m .  http://hin.nhlbi.nih.gov/menuplanner/menu.cgi    Pre-visit planning  Immunizations - up to date  Colonoscopy - is up to date  Mammogram -   Asthma -   PHQ9 -    DORCAS-7 -      The patient has verbalized that it is ok to leave a detailed voice message on the patient's work voicemail with results/recommendations from this visit.

## 2023-12-09 DIAGNOSIS — F43.23 ADJUSTMENT DISORDER WITH MIXED ANXIETY AND DEPRESSED MOOD: ICD-10-CM

## 2023-12-11 RX ORDER — ESCITALOPRAM OXALATE 10 MG/1
10 TABLET ORAL DAILY
Qty: 90 TABLET | Refills: 1 | Status: SHIPPED | OUTPATIENT
Start: 2023-12-11 | End: 2024-06-13

## 2023-12-11 NOTE — TELEPHONE ENCOUNTER
Donis Barraza is requesting a refill of:    Pending Prescriptions:                       Disp   Refills    escitalopram (LEXAPRO) 10 MG tablet [Phar*90 tab*1            Sig: TAKE 1 TABLET(10 MG) BY MOUTH DAILY    Pt was seen on 12/5/23

## 2024-01-08 DIAGNOSIS — E11.9 TYPE 2 DIABETES MELLITUS WITHOUT COMPLICATION, WITHOUT LONG-TERM CURRENT USE OF INSULIN (H): ICD-10-CM

## 2024-01-08 DIAGNOSIS — E78.2 MIXED HYPERLIPIDEMIA: ICD-10-CM

## 2024-01-10 RX ORDER — SIMVASTATIN 20 MG
20 TABLET ORAL AT BEDTIME
Qty: 90 TABLET | Refills: 1 | Status: SHIPPED | OUTPATIENT
Start: 2024-01-10 | End: 2024-06-13

## 2024-01-10 NOTE — TELEPHONE ENCOUNTER
Pt last seen 12/05/23 advised to return in 6 months.    Donis Barraza is requesting a refill of:    Pending Prescriptions:                       Disp   Refills    simvastatin (ZOCOR) 20 MG tablet [Pharmac*90 tab*1            Sig: TAKE 1 TABLET(20 MG) BY MOUTH AT BEDTIME       Bleeding that does not stop/Swelling that gets worse/Pain not relieved by Medications/Fever greater than (need to indicate Fahrenheit or Celsius)/Nausea and vomiting that does not stop <-- Click to add NO significant Past Surgical History

## 2024-02-16 ENCOUNTER — TELEPHONE (OUTPATIENT)
Dept: FAMILY MEDICINE | Facility: CLINIC | Age: 64
End: 2024-02-16

## 2024-06-03 ENCOUNTER — TELEPHONE (OUTPATIENT)
Dept: FAMILY MEDICINE | Facility: CLINIC | Age: 64
End: 2024-06-03

## 2024-06-03 DIAGNOSIS — I10 HTN (HYPERTENSION), BENIGN: ICD-10-CM

## 2024-06-03 DIAGNOSIS — E11.9 TYPE 2 DIABETES MELLITUS WITHOUT COMPLICATION, WITHOUT LONG-TERM CURRENT USE OF INSULIN (H): ICD-10-CM

## 2024-06-05 RX ORDER — ORAL SEMAGLUTIDE 7 MG/1
TABLET ORAL
COMMUNITY
Start: 2024-06-05

## 2024-06-05 RX ORDER — LISINOPRIL 10 MG/1
10 TABLET ORAL DAILY
COMMUNITY
Start: 2024-06-05

## 2024-06-05 NOTE — TELEPHONE ENCOUNTER
Donis Barraza is requesting a refill of:    Refused Prescriptions:                       Disp   Refills    Semaglutide (RYBELSUS) 7 MG tablet [Pharma*                Sig: TAKE 1 TABLET(7 MG) BY MOUTH DAILY  Refused By: PURVI MAGALLANES  Reason for Refusal: Patient needs appointment    lisinopril (ZESTRIL) 10 MG tablet [Pharmac*                Sig: TAKE 1 TABLET(10 MG) BY MOUTH DAILY  Refused By: PURVI MAGALLANES  Reason for Refusal: Patient needs appointment    Needs OV for refills

## 2024-06-10 RX ORDER — LISINOPRIL 10 MG/1
10 TABLET ORAL DAILY
Qty: 10 TABLET | Refills: 0 | Status: SHIPPED | OUTPATIENT
Start: 2024-06-10 | End: 2024-06-13

## 2024-06-10 RX ORDER — ORAL SEMAGLUTIDE 7 MG/1
TABLET ORAL
Qty: 10 TABLET | Refills: 0 | Status: SHIPPED | OUTPATIENT
Start: 2024-06-10 | End: 2024-06-13

## 2024-06-13 ENCOUNTER — OFFICE VISIT (OUTPATIENT)
Dept: FAMILY MEDICINE | Facility: CLINIC | Age: 64
End: 2024-06-13

## 2024-06-13 VITALS
TEMPERATURE: 97.3 F | WEIGHT: 217 LBS | HEIGHT: 67 IN | RESPIRATION RATE: 20 BRPM | DIASTOLIC BLOOD PRESSURE: 82 MMHG | BODY MASS INDEX: 34.06 KG/M2 | HEART RATE: 68 BPM | SYSTOLIC BLOOD PRESSURE: 114 MMHG

## 2024-06-13 DIAGNOSIS — E78.2 MIXED HYPERLIPIDEMIA: ICD-10-CM

## 2024-06-13 DIAGNOSIS — Z23 NEED FOR VACCINATION: ICD-10-CM

## 2024-06-13 DIAGNOSIS — E11.9 TYPE 2 DIABETES MELLITUS WITHOUT COMPLICATION, WITHOUT LONG-TERM CURRENT USE OF INSULIN (H): Primary | ICD-10-CM

## 2024-06-13 DIAGNOSIS — L72.9 SKIN CYST: ICD-10-CM

## 2024-06-13 DIAGNOSIS — Z12.5 SPECIAL SCREENING FOR MALIGNANT NEOPLASM OF PROSTATE: ICD-10-CM

## 2024-06-13 DIAGNOSIS — Z76.89 HEALTH CARE HOME: ICD-10-CM

## 2024-06-13 DIAGNOSIS — Z71.89 ACP (ADVANCE CARE PLANNING): ICD-10-CM

## 2024-06-13 DIAGNOSIS — G47.33 OSA (OBSTRUCTIVE SLEEP APNEA): ICD-10-CM

## 2024-06-13 DIAGNOSIS — F43.23 ADJUSTMENT DISORDER WITH MIXED ANXIETY AND DEPRESSED MOOD: ICD-10-CM

## 2024-06-13 DIAGNOSIS — I10 HTN (HYPERTENSION), BENIGN: ICD-10-CM

## 2024-06-13 DIAGNOSIS — E66.01 MORBID OBESITY (H): ICD-10-CM

## 2024-06-13 LAB
ALBUMIN (URINE) MG/L: 30
ALBUMIN SERPL-MCNC: 4.5 G/DL (ref 3.6–5.1)
ALBUMIN URINE MG/G CR: <30 MG/G CREATININE
ALP SERPL-CCNC: 54 U/L (ref 33–130)
ALT 1742-6: 17 U/L (ref 0–32)
AST 1920-8: 13 U/L (ref 0–35)
BILIRUB SERPL-MCNC: 0.6 MG/DL (ref 0.2–1.2)
BUN SERPL-MCNC: 19 MG/DL (ref 7–25)
BUN/CREATININE RATIO: 18 (ref 6–32)
CALCIUM SERPL-MCNC: 10 MG/DL (ref 8.6–10.3)
CHLORIDE SERPLBLD-SCNC: 101.6 MMOL/L (ref 98–110)
CHOLEST SERPL-MCNC: 186 MG/DL (ref 0–199)
CHOLEST/HDLC SERPL: 3 {RATIO} (ref 0–5)
CO2 SERPL-SCNC: 29.9 MMOL/L (ref 20–32)
CREAT SERPL-MCNC: 1.06 MG/DL (ref 0.6–1.3)
CREATININE URINE MG/DL: 200 MG/DL
GLUCOSE SERPL-MCNC: 204 MG/DL (ref 60–99)
HDLC SERPL-MCNC: 54 MG/DL (ref 40–150)
HEMOGLOBIN A1C: 7.3 % (ref 4–5.6)
LDLC SERPL CALC-MCNC: 109 MG/DL
POTASSIUM SERPL-SCNC: 5.8 MMOL/L (ref 3.5–5.3)
PROT SERPL-MCNC: 7.2 G/DL (ref 6.1–8.1)
SODIUM SERPL-SCNC: 138.2 MMOL/L (ref 135–146)
TRIGL SERPL-MCNC: 116 MG/DL (ref 0–149)

## 2024-06-13 PROCEDURE — 99214 OFFICE O/P EST MOD 30 MIN: CPT | Mod: 25 | Performed by: FAMILY MEDICINE

## 2024-06-13 PROCEDURE — 80061 LIPID PANEL: CPT | Performed by: FAMILY MEDICINE

## 2024-06-13 PROCEDURE — 90715 TDAP VACCINE 7 YRS/> IM: CPT | Performed by: FAMILY MEDICINE

## 2024-06-13 PROCEDURE — 82570 ASSAY OF URINE CREATININE: CPT | Performed by: FAMILY MEDICINE

## 2024-06-13 PROCEDURE — 96127 BRIEF EMOTIONAL/BEHAV ASSMT: CPT | Performed by: FAMILY MEDICINE

## 2024-06-13 PROCEDURE — 82043 UR ALBUMIN QUANTITATIVE: CPT | Performed by: FAMILY MEDICINE

## 2024-06-13 PROCEDURE — 80053 COMPREHEN METABOLIC PANEL: CPT | Performed by: FAMILY MEDICINE

## 2024-06-13 PROCEDURE — 90471 IMMUNIZATION ADMIN: CPT | Performed by: FAMILY MEDICINE

## 2024-06-13 PROCEDURE — 36415 COLL VENOUS BLD VENIPUNCTURE: CPT | Performed by: FAMILY MEDICINE

## 2024-06-13 PROCEDURE — G2211 COMPLEX E/M VISIT ADD ON: HCPCS | Performed by: FAMILY MEDICINE

## 2024-06-13 PROCEDURE — 83036 HEMOGLOBIN GLYCOSYLATED A1C: CPT | Performed by: FAMILY MEDICINE

## 2024-06-13 RX ORDER — LISINOPRIL 10 MG/1
10 TABLET ORAL DAILY
Qty: 90 TABLET | Refills: 1 | Status: SHIPPED | OUTPATIENT
Start: 2024-06-13

## 2024-06-13 RX ORDER — METFORMIN HCL 500 MG
1500 TABLET, EXTENDED RELEASE 24 HR ORAL
Qty: 360 TABLET | Refills: 1 | Status: SHIPPED | OUTPATIENT
Start: 2024-06-13

## 2024-06-13 RX ORDER — SIMVASTATIN 20 MG
20 TABLET ORAL AT BEDTIME
Qty: 90 TABLET | Refills: 1 | Status: SHIPPED | OUTPATIENT
Start: 2024-06-13

## 2024-06-13 RX ORDER — ESCITALOPRAM OXALATE 10 MG/1
10 TABLET ORAL DAILY
Qty: 90 TABLET | Refills: 1 | Status: CANCELLED | OUTPATIENT
Start: 2024-06-13

## 2024-06-13 RX ORDER — ORAL SEMAGLUTIDE 7 MG/1
7 TABLET ORAL DAILY
Qty: 90 TABLET | Refills: 1 | Status: SHIPPED | OUTPATIENT
Start: 2024-06-13 | End: 2024-06-27

## 2024-06-13 ASSESSMENT — ANXIETY QUESTIONNAIRES
2. NOT BEING ABLE TO STOP OR CONTROL WORRYING: SEVERAL DAYS
6. BECOMING EASILY ANNOYED OR IRRITABLE: NOT AT ALL
7. FEELING AFRAID AS IF SOMETHING AWFUL MIGHT HAPPEN: NOT AT ALL
5. BEING SO RESTLESS THAT IT IS HARD TO SIT STILL: NOT AT ALL
GAD7 TOTAL SCORE: 3
GAD7 TOTAL SCORE: 3
1. FEELING NERVOUS, ANXIOUS, OR ON EDGE: NOT AT ALL
3. WORRYING TOO MUCH ABOUT DIFFERENT THINGS: SEVERAL DAYS
IF YOU CHECKED OFF ANY PROBLEMS ON THIS QUESTIONNAIRE, HOW DIFFICULT HAVE THESE PROBLEMS MADE IT FOR YOU TO DO YOUR WORK, TAKE CARE OF THINGS AT HOME, OR GET ALONG WITH OTHER PEOPLE: NOT DIFFICULT AT ALL

## 2024-06-13 ASSESSMENT — PATIENT HEALTH QUESTIONNAIRE - PHQ9
5. POOR APPETITE OR OVEREATING: SEVERAL DAYS
SUM OF ALL RESPONSES TO PHQ QUESTIONS 1-9: 5

## 2024-06-13 NOTE — PROGRESS NOTES
"  Assessment & Plan     Type 2 diabetes mellitus without complication, without long-term current use of insulin (H)  Control has slipped a bit but he has been of meds x 9 days-recommend he restart-he does feel like his stomach is less irritable off meds- recommended he reach out to MTM to discuss side effects and options  - HEMOGLOBIN A1C (BFP)  - VENOUS COLLECTION    Mixed hyperlipidemia  Control uncertain, ran out of statin 9 days ago, restart    HTN (hypertension), benign  Well controlled, continue current medications at current doses     Adjustment disorder with mixed anxiety and depressed mood  Controlled, did not tolerate Lexapro- feels fine off but admits to low energy-\"lack of mojo\"-suspect this may be sleep related (see below)    Health Care Home      ACP (advance care planning)      Special screening for malignant neoplasm of prostate      Need for vaccination      NOEMI (obstructive sleep apnea)  Cpap not being used-pt states he has low energy-suspect this is due to poor sleep-untreated NOEMI, encouraged him to use cpap for several weeks and see how he feels    Morbid obesity (H)  Weight up again, Continue to work on healthy diet and exercise, discussed healthy habits     Consider MTM discussion of other med options            BMI  Estimated body mass index is 33.99 kg/m  as calculated from the following:    Height as of this encounter: 1.702 m (5' 7\").    Weight as of this encounter: 98.4 kg (217 lb).             No follow-ups on file.    Dalton Dykes is a 64 year old, presenting for the following health issues:  Recheck Medication    HPI       Diabetes Follow-up, Rybelsus and metformin-ran out of all meds 6/3/24    How often are you checking your blood sugar? Not at all  What concerns do you have today about your diabetes? None   Do you have any of these symptoms? (Select all that apply)  No numbness or tingling in feet.  No redness, sores or blisters on feet.  No complaints of excessive thirst.  No " reports of blurry vision.  No significant changes to weight.  Have you had a diabetic eye exam in the last 12 months? yes            Hyperlipidemia [id number]-[id number] was taking simvastatin up until 6/3/24 whe he ran out    Are you regularly taking any medication or supplement to lower your cholesterol?   Yes- simvastatin  Are you having muscle aches or other side effects that you think could be caused by your cholesterol lowering medication?  No    Hypertension Follow-up    Do you check your blood pressure regularly outside of the clinic? Yes   Are you following a low salt diet? No  Are your blood pressures ever more than 140 on the top number (systolic) OR more   than 90 on the bottom number (diastolic), for example 140/90? No    BP Readings from Last 2 Encounters:   06/13/24 114/82   12/05/23 114/80     Hemoglobin A1C (%)   Date Value   12/05/2023 6.7   06/01/2023 6.2     LDL Cholesterol Calculated (mg/dL)   Date Value   02/02/2019 117 (H)   05/24/2013 157 (H)     LDL Cholesterol Direct (mg/dL)   Date Value   12/05/2023 158 (A)   03/06/2023 130         Depression and Anxiety -stopped Lexapro as it made him feel like he did not care about anything  How are you doing with your depression since your last visit? No change  How are you doing with your anxiety since your last visit?  No change  Are you having other symptoms that might be associated with depression or anxiety? No  Have you had a significant life event? No   Do you have any concerns with your use of alcohol or other drugs? No    Social History     Tobacco Use    Smoking status: Never    Smokeless tobacco: Former    Tobacco comments:     passive user   Substance Use Topics    Alcohol use: Yes     Alcohol/week: 1.0 standard drink of alcohol     Types: 1 Standard drinks or equivalent per week     Comment: very little    Drug use: No         3/28/2023     9:25 AM   PHQ   PHQ-9 Total Score 12   Q9: Thoughts of better off dead/self-harm past 2 weeks Not at all  "        3/28/2023     9:25 AM   DORCAS-7 SCORE   Total Score 10     Mood screeners done and scanned    Suicide Assessment Five-step Evaluation and Treatment (SAFE-T)    How many servings of fruits and vegetables do you eat daily?  2-3  On average, how many sweetened beverages do you drink each day (Examples: soda, juice, sweet tea, etc.  Do NOT count diet or artificially sweetened beverages)?   1  How many days per week do you exercise enough to make your heart beat faster? 5  How many minutes a day do you exercise enough to make your heart beat faster? 20 - 29  How many days per week do you miss taking your medication? 0        Review of Systems  Constitutional, HEENT, cardiovascular, pulmonary, gi and gu systems are negative, except as otherwise noted.      Objective    /82 (BP Location: Right arm, Patient Position: Chair, Cuff Size: Adult Regular)   Pulse 68   Temp 97.3  F (36.3  C) (Temporal)   Resp 20   Ht 1.702 m (5' 7\")   Wt 98.4 kg (217 lb)   BMI 33.99 kg/m    Body mass index is 33.99 kg/m .  Physical Exam   GENERAL: alert and no distress  NECK: no adenopathy, no asymmetry, masses, or scars  RESP: lungs clear to auscultation - no rales, rhonchi or wheezes  CV: regular rate and rhythm, normal S1 S2, no S3 or S4, no murmur, click or rub, no peripheral edema  ABDOMEN: soft, nontender, no hepatosplenomegaly, no masses and bowel sounds normal  MS: no gross musculoskeletal defects noted, no edema  PSYCH: mentation appears normal, affect normal/bright  Diabetic foot exam: normal DP and PT pulses, no trophic changes or ulcerative lesions, and normal sensory exam    Results for orders placed or performed in visit on 06/13/24 (from the past 24 hour(s))   HEMOGLOBIN A1C (BFP)   Result Value Ref Range    Hemoglobin A1C 7.3 (A) 4 - 5.6 %           Signed Electronically by: Diego Guerra MD      "

## 2024-06-13 NOTE — NURSING NOTE
Donis Barraza is here for fasting blood work and medication refill.  Questioned patient about current smoking habits.  Pt. has never smoked.  Body mass index is 33.67 kg/(m^2).  PULSE regular  My Chart: active    Pre-visit planning  Immunizations - tdap  Colonoscopy - UTD  Mammogram -   Asthma -   PHQ9  DORCAS-7    The patient has verbalized that it is ok to leave a detailed voice message on the patient's cell phone with results/recommendations from this visit.

## 2024-06-13 NOTE — LETTER
June 13, 2024      Donis Barraza  [address] [facility] [address]        Dear ,    We are writing to inform you of your test results.    Liver, kidney, urine, lipid tests look fine. Potassium a bit high again, we will recheck next visit.     Resulted Orders   HEMOGLOBIN A1C (BFP)   Result Value Ref Range    Hemoglobin A1C 7.3 (A) 4 - 5.6 %   Lipid Panel (BFP)   Result Value Ref Range    Cholesterol 186 0 - 199 mg/dL    Triglycerides 116 0 - 149 mg/dL    HDL Cholesterol 54 40 - 150 mg/dL    LDL-C 109 mg/dL    Cholesterol/HDL Ratio 3 0 - 5   Comprehensive Metobolic Panel (BFP)   Result Value Ref Range    Carbon Dioxide 29.9 20 - 32 mmol/L    Creatinine 1.06 0.60 - 1.30 mg/dL    Glucose 204 (A) 60 - 99 mg/dL    Sodium 138.2 135 - 146 mmol/L    Potassium 5.8 (A) 3.5 - 5.3 mmol/L    Chloride 101.6 98 - 110 mmol/L    Protein Total 7.2 6.1 - 8.1 g/dL    Albumin 4.5 3.6 - 5.1 g/dL    Alkaline Phosphatase 54 33 - 130 U/L    ALT 17 0 - 32 U/L    AST 13 0 - 35 U/L    Bilirubin Total 0.6 0.2 - 1.2 mg/dL    Urea Nitrogen 19 7 - 25 mg/dL    Calcium 10.0 8.6 - 10.3 mg/dL    BUN/Creatinine Ratio 18 6 - 32   ALBUMIN RANDOM URINE QUANTITATIVE (BFP)   Result Value Ref Range    Albumin mg/L 30 30    Creatinine Urine mg/dL 200 300 mg/dL    Albumin Urine mg/g Cr <30 30 MG/G Creatinine       If you have any questions or concerns, please call the clinic at the number listed above.       Sincerely,      Diego Guerra MD

## 2024-06-14 ENCOUNTER — TRANSFERRED RECORDS (OUTPATIENT)
Dept: FAMILY MEDICINE | Facility: CLINIC | Age: 64
End: 2024-06-14

## 2024-06-17 PROBLEM — Z76.89 HEALTH CARE HOME: Status: RESOLVED | Noted: 2024-06-13 | Resolved: 2024-06-17

## 2024-06-27 ENCOUNTER — OFFICE VISIT (OUTPATIENT)
Dept: FAMILY MEDICINE | Facility: CLINIC | Age: 64
End: 2024-06-27

## 2024-06-27 DIAGNOSIS — E11.9 TYPE 2 DIABETES MELLITUS WITHOUT COMPLICATION, WITHOUT LONG-TERM CURRENT USE OF INSULIN (H): Primary | ICD-10-CM

## 2024-06-27 NOTE — PROGRESS NOTES
SUBJECTIVE/OBJECTIVE:                Donis Barraza is a 64 year old male seen for a follow-up visit for Medication Management Services.  He was referred to me from Dr. Diego Guerra.     Chief Complaint: Follow up from [facility] visit on 04/07/2023.      Diabetes:  Current Medications:  Current Outpatient Medications   Medication Sig Dispense Refill    lisinopril (ZESTRIL) 10 MG tablet Take 1 tablet (10 mg) by mouth daily 90 tablet 1    metFORMIN (GLUCOPHAGE XR) 500 MG 24 hr tablet Take 3 tablets (1,500 mg) by mouth daily (with dinner) 360 tablet 1    Semaglutide (RYBELSUS) 7 MG tablet Take 1 tablet (7 mg) by mouth daily 90 tablet 1    simvastatin (ZOCOR) 20 MG tablet Take 1 tablet (20 mg) by mouth at bedtime 90 tablet 1     No current facility-administered medications for this visit.     We discussed the benefits and risks of each medication.  Patient Questions/Concerns:  Patient's A1c has increased slightly and also notes hunger is returning while on Rybelsus. States that he has stomach pain that is there more often than not, but cannot determine if this is worsened with any medication.    Labs:  Lab Results   Component Value Date    A1C 7.3 06/13/2024    A1C 6.7 12/05/2023    A1C 6.2 06/01/2023    A1C 7.9 03/06/2023    A1C 7.8 09/09/2022    A1C 7.5 03/17/2022           ASSESSMENT/PLAN:                Diabetes::  Assessment: Patient had Rybelsus added to diabetes regimen about 15 months ago. Notes was quite effective when initiating, however notes appetite suppression is wearing off.     Patient notes having stomach problems, feels backed up and uncomfortable more days than not. Notes no consistency with when this is occurring and states did not notice if worsened when initiating Rybelsus.    He does note that joints have been more painful lately.    Discussed switching from Rybelsus to Mounjaro to help bring A1c back to goal as well as help maintain/lose weight. Patient was a bit leery about needles, however  he is willing to try this to help improve his overall health.    Will start Mounjaro 2.5mg weekly and increase as tolerating.    Status: Diabetes control slipping slightly.    Drug Therapy Problems:  1) Side effects, potentially from Rybelsus  2) Diabetes control slipping  Plan:  1) Initiate Mounjaro 2.5mg weekly.   2) Follow up in 3-4 weeks and increase if tolerating well.      I spent 45 minutes with this patient today.  All changes were made via collaborative practice agreement with Diego Guerra. A copy of the visit note was provided to the patient's primary care provider.    Phuong Hernandez, PharmD  Clinical Pharmacist  [facility]  General Clinic Phone: [phone]  Direct Office Phone: [phone]        Joints hurt. More arthritis showing up.     [employer] BIN [id number] PCN [employer]ERS ID [id number]

## 2024-07-01 ENCOUNTER — TRANSFERRED RECORDS (OUTPATIENT)
Dept: FAMILY MEDICINE | Facility: CLINIC | Age: 64
End: 2024-07-01

## 2024-09-12 ENCOUNTER — OFFICE VISIT (OUTPATIENT)
Dept: FAMILY MEDICINE | Facility: CLINIC | Age: 64
End: 2024-09-12

## 2024-09-12 VITALS
TEMPERATURE: 97.1 F | WEIGHT: 215.6 LBS | HEIGHT: 67 IN | RESPIRATION RATE: 20 BRPM | SYSTOLIC BLOOD PRESSURE: 128 MMHG | BODY MASS INDEX: 33.84 KG/M2 | HEART RATE: 88 BPM | DIASTOLIC BLOOD PRESSURE: 80 MMHG

## 2024-09-12 DIAGNOSIS — M25.50 PAIN IN JOINT, MULTIPLE SITES: Primary | ICD-10-CM

## 2024-09-12 DIAGNOSIS — E66.01 MORBID OBESITY (H): ICD-10-CM

## 2024-09-12 DIAGNOSIS — E11.9 TYPE 2 DIABETES MELLITUS WITHOUT COMPLICATION, WITHOUT LONG-TERM CURRENT USE OF INSULIN (H): ICD-10-CM

## 2024-09-12 LAB — ERYTHROCYTE [SEDIMENTATION RATE] IN BLOOD: 3 MM/HR (ref 0–20)

## 2024-09-12 PROCEDURE — 99213 OFFICE O/P EST LOW 20 MIN: CPT | Performed by: FAMILY MEDICINE

## 2024-09-12 PROCEDURE — 36415 COLL VENOUS BLD VENIPUNCTURE: CPT | Performed by: FAMILY MEDICINE

## 2024-09-12 PROCEDURE — 86431 RHEUMATOID FACTOR QUANT: CPT | Performed by: FAMILY MEDICINE

## 2024-09-12 PROCEDURE — 85651 RBC SED RATE NONAUTOMATED: CPT | Performed by: FAMILY MEDICINE

## 2024-09-12 PROCEDURE — 86038 ANTINUCLEAR ANTIBODIES: CPT | Performed by: FAMILY MEDICINE

## 2024-09-12 NOTE — Clinical Note
Obed Baca  I was hoping to catch you today regarding Lev- it sounds like you talked to him about Rybelsus and joint pains?  I ordered some labs to look into inflammatory issues.  He is off Rybelsus but not sure it helped- are you follwing along with him and his diabetes meds -looks like he started mounjaro  Just want to make sure we are on same page  Thx

## 2024-09-12 NOTE — TELEPHONE ENCOUNTER
Mounjaro was not covered previously, thinking because he still had supply of Rybelsus at home.    Will try to start this now that supply has dwindled.    Phuong Hernandez, PharmD  Clinical Pharmacist  [facility] [place][note 2] Phone: [phone]  Direct Office Phone: [phone]

## 2024-09-12 NOTE — LETTER
September 16, 2024      Donis Barraza  [address] [facility] [address]        Dear ,    We are writing to inform you of your test results.    Your test results fall within the expected range(s) or remain unchanged from previous results.  Please continue with current treatment plan. No results to suggest rheumatoid arthritis or other inflammatory conditions.  If you symptoms persist we may want to refer you to rheumatology-let me know.    Dr Guerra    Resulted Orders   ESR WESTERGREN ([facility])   Result Value Ref Range    Sed Rate 3 0 - 20 mm/hr   FELY Scrn Rflx to Titer and Ptrn ([facility])   Result Value Ref Range    FELY Screen NEGATIVE NEGATIVE      Comment:      FELY IFA is a first line screen for detecting the  presence of up to approximately 150 autoantibodies in  various autoimmune diseases. A negative FELY IFA result  suggests an FELY-associated autoimmune disease is not  present at this time, but is not definitive. If there  is high clinical suspicion for Sjogren's syndrome,  testing for anti-SS-A/Ro antibody should be considered.  Anti-Sofie-1 antibody should be considered for clinically  suspected inflammatory myopathies.     AC-0: Negative     International Consensus on FELY Patterns  (https://doi.org/10.1515/hbou-7494-0230)     For additional information, please refer to  http://education.Traction.com/faq/CYR745  (This link is being provided for informational/  educational purposes only.)         RHEUMATOID FACTOR ([facility])   Result Value Ref Range    Rheumatoid Factor <10 <14 IU/mL       If you have any questions or concerns, please call the clinic at the number listed above.       Sincerely,      Diego Guerra MD

## 2024-09-12 NOTE — NURSING NOTE
Donis SAKHSI Barraza is here for joint pain. Has been for several months. Stopped Rybelsus to see if that was the cause. This was not.    Questioned patient about current smoking habits.  Pt. has never smoked.  PULSE regular  My Chart: declines  CLASSIFICATION OF OVERWEIGHT AND OBESITY BY BMI                        Obesity Class           BMI(kg/m2)  Underweight                                    < 18.5  Normal                                         18.5-24.9  Overweight                                     25.0-29.9  OBESITY                     I                  30.0-34.9                             II                 35.0-39.9  EXTREME OBESITY             III                >40                            Patient's  BMI Body mass index is 33.77 kg/m .  http://hin.nhlbi.nih.gov/menuplanner/menu.cgi  Pre-visit planning  Immunizations - up to date  Colonoscopy - is up to date  Mammogram -   Asthma -   PHQ9 -    DORCAS-7 -      The patient has verbalized that it is ok to leave a detailed voice message on the patient's cell phone with results/recommendations from this visit.

## 2024-09-12 NOTE — PROGRESS NOTES
SUBJECTIVE:  Donis Barraza, a 64 year old male scheduled an appointment to discuss the following issues:     Pain in joint, multiple sites  Type 2 diabetes mellitus without complication, without long-term current use of insulin (H)  Morbid obesity (H)  Pt noted pain in multiple joints, elbows, hips, knees, hands 4 weeks ago- spoke with MTM a week ago and was recommended to stop Rybelsus    He does note his weight has increased off the Rybelsus    He feels a bit better but still feels stiff and sore. He feels weak.      Medical, social, surgical, and family histories reviewed.    Patient Active Problem List   Diagnosis    Anal fissure    SOB (shortness of breath)    Diaphragm paralysis    Class 2 severe obesity due to excess calories with serious comorbidity and body mass index (BMI) of 38.0 to 38.9 in adult (H)    Mixed hyperlipidemia    Uncontrolled diabetes mellitus type 2 without complications    Nonsmoker    Hypoxia since 2013    Fatty liver per 5-13 CT     History of colonic polyps one per 2-08 w fam hx same    Abnormal CT scan of lung 5-13 lingular nodule     Bronchitis subacute onset 11-18    Screening for diabetic peripheral neuropathy    Traumatic incomplete tear of right rotator cuff, subsequent encounter    Claustrophobia    Hx of repair of left rotator cuff    Mixed simple and mucopurulent chronic bronchitis (H): spirometry  1-20  FVC&FEV1 = 1%    ACP (advance care planning)       Past Medical History:   Diagnosis Date    Arthritis     Diabetes (H)     h/o Rheumatic fever     Hemidiaphragm paralysis - right     Obese        Family History   Problem Relation Age of Onset    Diabetes Mother     Cardiovascular Mother         valvular heart disease    Eye Disorder Mother         unclear hereditary eye disease    Heart Disease Mother         afib     Other - See Comments Mother         low blood pressure    Cancer Mother         Squamous cell carcinoma of the salivary gland    Parkinsonism Mother 87     Gastrointestinal Disease Father         polyps/gall bladder disease    Myocardial Infarction Father 86    Heart Disease Father         afib    Other - See Comments Father         low blood pressure    Coronary Artery Disease Father     Colon Polyps Father     Gastrointestinal Disease Brother         polyps    Colon Polyps Brother     Colon Polyps Brother     Parkinsonism Maternal Grandmother     No Known Problems Maternal Grandfather     Stomach Cancer Paternal Grandmother 80    Cancer Paternal Grandfather 55        Leukemia       Social History     Socioeconomic History    Marital status:      Spouse name: Not on file    Number of children: 4    Years of education: Not on file    Highest education level: Not on file   Occupational History     Employer: [employer]   Tobacco Use    Smoking status: Never    Smokeless tobacco: Former    Tobacco comments:     passive user   Substance and Sexual Activity    Alcohol use: Yes     Alcohol/week: 1.0 standard drink of alcohol     Types: 1 Standard drinks or equivalent per week     Comment: very little    Drug use: No    Sexual activity: Yes   Other Topics Concern     Service Not Asked    Blood Transfusions Not Asked    Caffeine Concern Not Asked    Occupational Exposure Not Asked    Hobby Hazards Not Asked    Sleep Concern Not Asked    Stress Concern Not Asked    Weight Concern Not Asked    Special Diet Not Asked    Back Care Not Asked    Exercise No     Comment: very active at work    Bike Helmet Yes    Seat Belt Yes    Self-Exams Not Asked   Social History Narrative    Not on file     Social Determinants of Health     Financial Resource Strain: Not on file   Food Insecurity: Not on file   Transportation Needs: Not on file   Physical Activity: Not on file   Stress: Not on file   Social Connections: Not on file   Interpersonal Safety: Not on file   Housing Stability: Not on file       Past Surgical History:   Procedure Laterality Date     "ARTHROSCOPY KNEE Left 6/22/2020    Procedure: Left knee arthroscopic partial medial meniscectomy;  Surgeon: Giovani Wood MD;  Location: [facility] OR    ARTHROTOMY SHOULDER, ROTATOR CUFF REPAIR, COMBINED Right 2/10/2020    Procedure: Rotator cuff repair, right shoulder;  Surgeon: Giovani Wood MD;  Location: [facility] OR    SHOULDER SURGERY Left 2007    s/p trauma    TONSILLECTOMY, ADENOIDECTOMY, COMBINED      ZZC APPENDECTOMY  1980's       Current Outpatient Medications   Medication Sig Dispense Refill    lisinopril (ZESTRIL) 10 MG tablet Take 1 tablet (10 mg) by mouth daily 90 tablet 1    metFORMIN (GLUCOPHAGE XR) 500 MG 24 hr tablet Take 3 tablets (1,500 mg) by mouth daily (with dinner) 360 tablet 1    simvastatin (ZOCOR) 20 MG tablet Take 1 tablet (20 mg) by mouth at bedtime 90 tablet 1    tirzepatide (MOUNJARO) 2.5 MG/0.5ML pen Inject 2.5 mg subcutaneously every 7 days. 2 mL 0     No current facility-administered medications for this visit.        Allergies: No known drug allergy      Immunization History   Administered Date(s) Administered    TD,PF 7+ (Tenivac) 01/01/1999    TDAP (Adacel,Boostrix) 06/13/2024    TDAP Vaccine (Adacel) 01/01/2014        ROS:  CONSTITUTIONAL: NEGATIVE for fever, chills  EYES: NEGATIVE for vision changes   RESP: NEGATIVE for significant cough or SOB  CV: NEGATIVE for chest pain, palpitations   GI: NEGATIVE for nausea, abdominal pain, heartburn, or change in bowel habits  : NEGATIVE for frequency, dysuria, or hematuria  NEURO: NEGATIVE for weakness, dizziness or paresthesias or headache  PSYCHIATRIC: NEGATIVE for changes in mood or affect    OBJECTIVE:  /80 (BP Location: Left arm, Patient Position: Chair, Cuff Size: Adult Regular)   Pulse 88   Temp 97.1  F (36.2  C) (Temporal)   Resp 20   Ht 1.702 m (5' 7\")   Wt 97.8 kg (215 lb 9.6 oz)   BMI 33.77 kg/m    EXAM:  GENERAL APPEARANCE: healthy, alert and no distress  EYES: EOMI,  PERRL  HENT: ear canals and TM's normal " and nose and mouth without ulcers or lesions  RESP: lungs clear to auscultation - no rales, rhonchi or wheezes  CV: regular rates and rhythm, normal S1 S2, no S3 or S4 and no murmur, click or rub -  MS: extremities normal- no gross deformities noted, no evidence of inflammation in joints, FROM in all extremities.    ASSESSMENT/PLAN:  (M25.50) Pain in joint, multiple sites  (primary encounter diagnosis)  Comment: pt with diffuse joint pain, stiffness, weakness  Plan: check labs for inflammatory arthropathy and PMR    (E11.9) Type 2 diabetes mellitus without complication, without long-term current use of insulin (H)  Comment: stay off Rybelsus for now  Plan: will check in with MTM for other options    (E66.01) Morbid obesity (H)  Comment: pt just starting mounjaro  Plan:  Continue to work on healthy diet and exercise, discussed healthy habits

## 2024-09-13 LAB
ANA SCREEN - QUEST: NEGATIVE
RHEUMATOID FACT SER NEPH-ACNC: <10 IU/ML (ref 0–20)

## 2024-10-07 DIAGNOSIS — E11.9 TYPE 2 DIABETES MELLITUS WITHOUT COMPLICATION, WITHOUT LONG-TERM CURRENT USE OF INSULIN (H): ICD-10-CM

## 2024-10-07 NOTE — TELEPHONE ENCOUNTER
Donis Barraza is requesting a refill of:    Pending Prescriptions:                       Disp   Refills    MOUNJARO 2.5 MG/0.5ML pen [Pharmacy Med N*2 mL   0            Sig: ADMINISTER 2.5 MG UNDER THE SKIN EVERY 7 DAYS

## 2024-10-08 NOTE — TELEPHONE ENCOUNTER
Patient has experienced no side effects with Mounjaro 2.5mg. Will increase to 5mg at this time.    Patient will continue on 5mg for two months, then return to clinic for follow up A1c.    Phuong Hernandez, PharmD  Clinical Pharmacist  [facility] Phone: [phone]  Direct Office Phone: [phone]

## 2024-12-03 DIAGNOSIS — E11.9 TYPE 2 DIABETES MELLITUS WITHOUT COMPLICATION, WITHOUT LONG-TERM CURRENT USE OF INSULIN (H): ICD-10-CM

## 2024-12-03 RX ORDER — TIRZEPATIDE 5 MG/.5ML
INJECTION, SOLUTION SUBCUTANEOUS
COMMUNITY
Start: 2024-12-03

## 2024-12-03 NOTE — TELEPHONE ENCOUNTER
Donis Barraza is requesting a refill of:    Refused Prescriptions:                       Disp   Refills    Tirzepatide (MOUNJARO) 5 MG/0.5ML SOAJ [Ph*                Sig: ADMINISTER 5 MG UNDER THE SKIN EVERY 7 DAYS  Refused By: PURVI MAGALLANES  Reason for Refusal: Patient needs appointment    Needs OV for refills

## 2024-12-05 ENCOUNTER — OFFICE VISIT (OUTPATIENT)
Dept: FAMILY MEDICINE | Facility: CLINIC | Age: 64
End: 2024-12-05

## 2024-12-05 VITALS
RESPIRATION RATE: 20 BRPM | HEART RATE: 80 BPM | HEIGHT: 67 IN | BODY MASS INDEX: 32.49 KG/M2 | TEMPERATURE: 97.2 F | DIASTOLIC BLOOD PRESSURE: 70 MMHG | WEIGHT: 207 LBS | SYSTOLIC BLOOD PRESSURE: 110 MMHG

## 2024-12-05 DIAGNOSIS — E78.2 MIXED HYPERLIPIDEMIA: ICD-10-CM

## 2024-12-05 DIAGNOSIS — E66.01 MORBID OBESITY (H): ICD-10-CM

## 2024-12-05 DIAGNOSIS — I10 HTN (HYPERTENSION), BENIGN: ICD-10-CM

## 2024-12-05 DIAGNOSIS — Z12.5 SPECIAL SCREENING FOR MALIGNANT NEOPLASM OF PROSTATE: ICD-10-CM

## 2024-12-05 DIAGNOSIS — E11.9 TYPE 2 DIABETES MELLITUS WITHOUT COMPLICATION, WITHOUT LONG-TERM CURRENT USE OF INSULIN (H): Primary | ICD-10-CM

## 2024-12-05 LAB
BUN SERPL-MCNC: 15 MG/DL (ref 7–25)
BUN/CREATININE RATIO: 14 (ref 6–32)
CALCIUM SERPL-MCNC: 9.7 MG/DL (ref 8.6–10.3)
CHLORIDE SERPLBLD-SCNC: 100.7 MMOL/L (ref 98–110)
CO2 SERPL-SCNC: 30.8 MMOL/L (ref 20–32)
CREAT SERPL-MCNC: 1.06 MG/DL (ref 0.6–1.3)
GLUCOSE SERPL-MCNC: 213 MG/DL (ref 60–99)
HEMOGLOBIN A1C: 6.6 % (ref 4–5.6)
POTASSIUM SERPL-SCNC: 4.94 MMOL/L (ref 3.5–5.3)
SODIUM SERPL-SCNC: 137.8 MMOL/L (ref 135–146)

## 2024-12-05 RX ORDER — TIRZEPATIDE 7.5 MG/.5ML
7.5 INJECTION, SOLUTION SUBCUTANEOUS
Qty: 2 ML | Refills: 1 | Status: SHIPPED | OUTPATIENT
Start: 2024-12-05

## 2024-12-05 RX ORDER — SIMVASTATIN 20 MG
20 TABLET ORAL AT BEDTIME
Qty: 90 TABLET | Refills: 1 | Status: SHIPPED | OUTPATIENT
Start: 2024-12-05

## 2024-12-05 RX ORDER — LISINOPRIL 10 MG/1
10 TABLET ORAL DAILY
Qty: 90 TABLET | Refills: 1 | Status: SHIPPED | OUTPATIENT
Start: 2024-12-05

## 2024-12-05 RX ORDER — METFORMIN HYDROCHLORIDE 500 MG/1
1500 TABLET, EXTENDED RELEASE ORAL
Qty: 360 TABLET | Refills: 1 | Status: SHIPPED | OUTPATIENT
Start: 2024-12-05

## 2024-12-05 NOTE — PROGRESS NOTES
"  Assessment & Plan     Type 2 diabetes mellitus without complication, without long-term current use of insulin (H)  Well controlled, continue current medications at current doses but increase to mounjaro dose  - HEMOGLOBIN A1C ([facility])  - VENOUS COLLECTION    Mixed hyperlipidemia  Controlled, continue current medications at current doses     HTN (hypertension), benign  Well controlled, continue current medications at current doses     Morbid obesity (H)  Doing well, will increase mounjara    Special screening for malignant neoplasm of prostate              BMI  Estimated body mass index is 32.42 kg/m  as calculated from the following:    Height as of this encounter: 1.702 m (5' 7\").    Weight as of this encounter: 93.9 kg (207 lb).   Weight management plan: Discussed healthy diet and exercise guidelines      FUTURE APPOINTMENTS:       - Follow-up visit in 6 mo  Work on weight loss  Regular exercise    No follow-ups on file.    Dalton Dykes is a 64 year old, presenting for the following health issues:  Recheck Medication and Toenail    HPI       Diabetes Follow-up    How often are you checking your blood sugar? Not at all  What concerns do you have today about your diabetes? None   Do you have any of these symptoms? (Select all that apply)  No numbness or tingling in feet.  No redness, sores or blisters on feet.  No complaints of excessive thirst.  No reports of blurry vision.  No significant changes to weight.  Have you had a diabetic eye exam in the last 12 months? yes            Hyperlipidemia Follow-Up    Are you regularly taking any medication or supplement to lower your cholesterol?   Yes- simvastatin  Are you having muscle aches or other side effects that you think could be caused by your cholesterol lowering medication?  No    Hypertension Follow-up    Do you check your blood pressure regularly outside of the clinic? Yes   Are you following a low salt diet? No  Are your blood pressures ever more than 140 " "on the top number (systolic) OR more   than 90 on the bottom number (diastolic), for example 140/90? No    BP Readings from Last 2 Encounters:   12/05/24 110/70   09/12/24 128/80     Hemoglobin A1C (%)   Date Value   06/13/2024 7.3 (A)   12/05/2023 6.7   06/01/2023 6.2     LDL Cholesterol Calculated (mg/dL)   Date Value   02/02/2019 117 (H)   05/24/2013 157 (H)     LDL Cholesterol Direct (mg/dL)   Date Value   12/05/2023 158 (A)   03/06/2023 130     How many servings of fruits and vegetables do you eat daily?  2-3  On average, how many sweetened beverages do you drink each day (Examples: soda, juice, sweet tea, etc.  Do NOT count diet or artificially sweetened beverages)?   1  How many days per week do you exercise enough to make your heart beat faster? 5  How many minutes a day do you exercise enough to make your heart beat faster? 30 - 60  How many days per week do you miss taking your medication? 0        Review of Systems  Constitutional, HEENT, cardiovascular, pulmonary, gi and gu systems are negative, except as otherwise noted.      Objective    /70 (BP Location: Left arm, Patient Position: Chair, Cuff Size: Adult Regular)   Pulse 80   Temp 97.2  F (36.2  C) (Temporal)   Resp 20   Ht 1.702 m (5' 7\")   Wt 93.9 kg (207 lb)   BMI 32.42 kg/m    Body mass index is 32.42 kg/m .  Physical Exam   GENERAL: alert and no distress  EYES: Eyes grossly normal to inspection, PERRL and conjunctivae and sclerae normal  HENT: ear canals and TM's normal, nose and mouth without ulcers or lesions  NECK: no adenopathy, no asymmetry, masses, or scars  RESP: lungs clear to auscultation - no rales, rhonchi or wheezes  CV: regular rate and rhythm, normal S1 S2, no S3 or S4, no murmur, click or rub, no peripheral edema  ABDOMEN: soft, nontender, no hepatosplenomegaly, no masses and bowel sounds normal  MS: no gross musculoskeletal defects noted, no edema  SKIN: toenails on right with some discoloration, no pain or " thickening    Office Visit on 09/12/2024   Component Date Value Ref Range Status    Sed Rate 09/12/2024 3  0 - 20 mm/hr Final    FELY Screen 09/12/2024 NEGATIVE  NEGATIVE Final    Comment: FELY IFA is a first line screen for detecting the  presence of up to approximately 150 autoantibodies in  various autoimmune diseases. A negative FELY IFA result  suggests an FELY-associated autoimmune disease is not  present at this time, but is not definitive. If there  is high clinical suspicion for Sjogren's syndrome,  testing for anti-SS-A/Ro antibody should be considered.  Anti-Sofie-1 antibody should be considered for clinically  suspected inflammatory myopathies.     AC-0: Negative     International Consensus on FELY Patterns  (https://doi.org/10.1515/uvia-7527-3157)     For additional information, please refer to  http://education.Cinemad.tv.PurpleBricks/faq/RDV484  (This link is being provided for informational/  educational purposes only.)          Rheumatoid Factor 09/12/2024 <10  <14 IU/mL Final           Signed Electronically by: Diego Guerra MD

## 2024-12-05 NOTE — NURSING NOTE
Donis Barraza is here for a medication check and refill. Also would like his toenails looked at     Questioned patient about current smoking habits.  Pt. has never smoked.  PULSE regular  My Chart: declines  CLASSIFICATION OF OVERWEIGHT AND OBESITY BY BMI                        Obesity Class           BMI(kg/m2)  Underweight                                    < 18.5  Normal                                         18.5-24.9  Overweight                                     25.0-29.9  OBESITY                     I                  30.0-34.9                             II                 35.0-39.9  EXTREME OBESITY             III                >40                            Patient's  BMI Body mass index is 32.42 kg/m .  http://hin.nhlbi.nih.gov/menuplanner/menu.cgi  Pre-visit planning  Immunizations - up to date  Colonoscopy -   Mammogram -   Asthma -   PHQ9 -    DORCAS-7 -      The patient has verbalized that it is ok to leave a detailed voice message on the patient's cell phone with results/recommendations from this visit.

## 2024-12-05 NOTE — LETTER
December 5, 2024      Donis Barraza  [address] [facility] [address]        Dear ,    We are writing to inform you of your test results.    Your test results fall within the expected range(s) or remain unchanged from previous results.  Please continue with current treatment plan. Kidneys look fine    Resulted Orders   HEMOGLOBIN A1C ([facility])   Result Value Ref Range    Hemoglobin A1C 6.6 (A) 4 - 5.6 %   Basic Metabolic Panel ([facility])   Result Value Ref Range    Carbon Dioxide 30.8 20 - 32 mmol/L    Creatinine 1.06 0.60 - 1.30 mg/dL    Glucose 213 (A) 60 - 99 mg/dL    Sodium 137.8 135 - 146 mmol/L    Potassium 4.94 3.5 - 5.3 mmol/L    Chloride 100.7 98 - 110 mmol/L    Urea Nitrogen 15 7 - 25 mg/dL    Calcium 9.7 8.6 - 10.3 mg/dL    BUN/Creatinine Ratio 14 6 - 32       If you have any questions or concerns, please call the clinic at the number listed above.       Sincerely,      Diego Guerra MD

## 2025-01-26 DIAGNOSIS — E66.01 MORBID OBESITY (H): ICD-10-CM

## 2025-01-26 DIAGNOSIS — E11.9 TYPE 2 DIABETES MELLITUS WITHOUT COMPLICATION, WITHOUT LONG-TERM CURRENT USE OF INSULIN (H): ICD-10-CM

## 2025-01-27 RX ORDER — TIRZEPATIDE 7.5 MG/.5ML
INJECTION, SOLUTION SUBCUTANEOUS
Qty: 2 ML | Refills: 1 | Status: SHIPPED | OUTPATIENT
Start: 2025-01-27

## 2025-01-27 NOTE — TELEPHONE ENCOUNTER
Donis Barraza is requesting a refill of:    Pending Prescriptions:                       Disp   Refills    MOUNJARO 7.5 MG/0.5ML SOAJ [Pharmacy Med *2 mL   1            Sig: ADMINISTER 7.5 MG UNDER THE SKIN EVERY 7 DAYS    Lab Results   Component Value Date    A1C 6.6 12/05/2024    A1C 7.3 06/13/2024    A1C 6.7 12/05/2023    A1C 6.2 06/01/2023    A1C 7.9 03/06/2023    A1C 7.8 09/09/2022    A1C 7.5 03/17/2022

## 2025-02-25 ENCOUNTER — OFFICE VISIT (OUTPATIENT)
Dept: FAMILY MEDICINE | Facility: CLINIC | Age: 65
End: 2025-02-25

## 2025-02-25 VITALS
WEIGHT: 198 LBS | BODY MASS INDEX: 31.08 KG/M2 | TEMPERATURE: 97.7 F | HEIGHT: 67 IN | SYSTOLIC BLOOD PRESSURE: 104 MMHG | RESPIRATION RATE: 20 BRPM | DIASTOLIC BLOOD PRESSURE: 72 MMHG | HEART RATE: 84 BPM

## 2025-02-25 DIAGNOSIS — E66.01 MORBID OBESITY (H): ICD-10-CM

## 2025-02-25 DIAGNOSIS — E11.9 TYPE 2 DIABETES MELLITUS WITHOUT COMPLICATION, WITHOUT LONG-TERM CURRENT USE OF INSULIN (H): Primary | ICD-10-CM

## 2025-02-25 PROCEDURE — 99213 OFFICE O/P EST LOW 20 MIN: CPT | Performed by: FAMILY MEDICINE

## 2025-02-25 PROCEDURE — G2211 COMPLEX E/M VISIT ADD ON: HCPCS | Performed by: FAMILY MEDICINE

## 2025-02-25 RX ORDER — TIRZEPATIDE 10 MG/.5ML
10 INJECTION, SOLUTION SUBCUTANEOUS
Qty: 2 ML | Refills: 1 | Status: SHIPPED | OUTPATIENT
Start: 2025-02-25

## 2025-02-25 RX ORDER — TIRZEPATIDE 7.5 MG/.5ML
INJECTION, SOLUTION SUBCUTANEOUS
Qty: 2 ML | Refills: 1 | Status: CANCELLED | OUTPATIENT
Start: 2025-02-25

## 2025-02-25 NOTE — PROGRESS NOTES
"  Assessment & Plan     Morbid obesity (H)  Discussed mounjaro use, goals, pt doing well, would like to lose more weight and hopefully get off a few meds- we agree to increase dose to 10 mg, I reviewed the risks, benefits, and possible side effects of the medication.  The patient had an opportunity to ask any questions regarding the treatment plan. The patient was encouraged to call my office if any problems.     Recheck diabetes in May  - MOUNJARO 10 MG/0.5ML SOAJ  Dispense: 2 mL; Refill: 1    Type 2 diabetes mellitus without complication, without long-term current use of insulin (H)    - MOUNJARO 10 MG/0.5ML SOAJ  Dispense: 2 mL; Refill: 1              FUTURE APPOINTMENTS:       - Follow-up visit in 3 mo  Work on weight loss  Regular exercise    No follow-ups on file.    Dalton Dykes is a 64 year old, presenting for the following health issues:  Recheck Medication    HPI       Obesity- using MOunjaro 7.5 mg daily, no side effects , weight down  How many servings of fruits and vegetables do you eat daily?  2-3  On average, how many sweetened beverages do you drink each day (Examples: soda, juice, sweet tea, etc.  Do NOT count diet or artificially sweetened beverages)?   1  How many days per week do you exercise enough to make your heart beat faster? 6  How many minutes a day do you exercise enough to make your heart beat faster? 30 - 60  How many days per week do you miss taking your medication? 0        Review of Systems  Constitutional, HEENT, cardiovascular, pulmonary, gi and gu systems are negative, except as otherwise noted.      Objective    /72 (BP Location: Right arm, Patient Position: Chair, Cuff Size: Adult Regular)   Pulse 84   Temp 97.7  F (36.5  C) (Temporal)   Resp 20   Ht 1.702 m (5' 7\")   Wt 89.8 kg (198 lb)   BMI 31.01 kg/m    Body mass index is 31.01 kg/m .  Physical Exam   GENERAL: alert and no distress  RESP: lungs clear to auscultation - no rales, rhonchi or wheezes  CV: " regular rate and rhythm, normal S1 S2, no S3 or S4, no murmur, click or rub, no peripheral edema  ABDOMEN: soft, nontender, no hepatosplenomegaly, no masses and bowel sounds normal    Office Visit on 12/05/2024   Component Date Value Ref Range Status    Hemoglobin A1C 12/05/2024 6.6 (A)  4 - 5.6 % Final    Carbon Dioxide 12/05/2024 30.8  20 - 32 mmol/L Final    Creatinine 12/05/2024 1.06  0.60 - 1.30 mg/dL Final    Glucose 12/05/2024 213 (A)  60 - 99 mg/dL Final    Sodium 12/05/2024 137.8  135 - 146 mmol/L Final    Potassium 12/05/2024 4.94  3.5 - 5.3 mmol/L Final    Chloride 12/05/2024 100.7  98 - 110 mmol/L Final    Urea Nitrogen 12/05/2024 15  7 - 25 mg/dL Final    Calcium 12/05/2024 9.7  8.6 - 10.3 mg/dL Final    BUN/Creatinine Ratio 12/05/2024 14  6 - 32 Final           Signed Electronically by: Diego Guerra MD

## 2025-02-25 NOTE — NURSING NOTE
Donis Barraza is here for a medication check and refill.    Questioned patient about current smoking habits.  Pt. has never smoked.  PULSE regular  My Chart: declines  Body mass index is 31.01 kg/m .    http://hin.nhlbi.nih.gov/menuplanner/menu.cgi  Pre-visit planning  Immunizations - up to date  Colonoscopy - is up to date  Mammogram -   Asthma -   PHQ9 -    DORCAS-7 -      The patient has verbalized that it is ok to leave a detailed voice message on the patient's cell phone with results/recommendations from this visit.

## 2025-04-22 DIAGNOSIS — E66.01 MORBID OBESITY (H): ICD-10-CM

## 2025-04-22 DIAGNOSIS — E11.9 TYPE 2 DIABETES MELLITUS WITHOUT COMPLICATION, WITHOUT LONG-TERM CURRENT USE OF INSULIN (H): ICD-10-CM

## 2025-04-22 RX ORDER — TIRZEPATIDE 10 MG/.5ML
10 INJECTION, SOLUTION SUBCUTANEOUS
Qty: 2 ML | Refills: 0 | Status: SHIPPED | OUTPATIENT
Start: 2025-04-22

## 2025-04-22 NOTE — TELEPHONE ENCOUNTER
Donis Barraza is requesting a refill of:    Pending Prescriptions:                       Disp   Refills    MOUNJARO 10 MG/0.5ML SOAJ auto-injector p*2 mL   0            Sig: Inject 0.5 mLs (10 mg) subcutaneously every 7           days.      Please close encounter if RX was sent. Thanks, Sheri

## 2025-05-22 ENCOUNTER — OFFICE VISIT (OUTPATIENT)
Dept: FAMILY MEDICINE | Facility: CLINIC | Age: 65
End: 2025-05-22

## 2025-05-22 VITALS
DIASTOLIC BLOOD PRESSURE: 72 MMHG | HEART RATE: 76 BPM | BODY MASS INDEX: 29.98 KG/M2 | RESPIRATION RATE: 20 BRPM | TEMPERATURE: 97.6 F | WEIGHT: 191 LBS | HEIGHT: 67 IN | SYSTOLIC BLOOD PRESSURE: 114 MMHG

## 2025-05-22 DIAGNOSIS — E11.9 TYPE 2 DIABETES MELLITUS WITHOUT COMPLICATION, WITHOUT LONG-TERM CURRENT USE OF INSULIN (H): Primary | ICD-10-CM

## 2025-05-22 DIAGNOSIS — Z12.5 SPECIAL SCREENING FOR MALIGNANT NEOPLASM OF PROSTATE: ICD-10-CM

## 2025-05-22 DIAGNOSIS — Z12.11 SPECIAL SCREENING FOR MALIGNANT NEOPLASMS, COLON: ICD-10-CM

## 2025-05-22 DIAGNOSIS — E78.2 MIXED HYPERLIPIDEMIA: ICD-10-CM

## 2025-05-22 DIAGNOSIS — I10 HTN (HYPERTENSION), BENIGN: ICD-10-CM

## 2025-05-22 DIAGNOSIS — R53.83 FATIGUE, UNSPECIFIED TYPE: ICD-10-CM

## 2025-05-22 DIAGNOSIS — E66.01 MORBID OBESITY (H): ICD-10-CM

## 2025-05-22 LAB
ALBUMIN SERPL-MCNC: 4.7 G/DL (ref 3.6–5.1)
ALP SERPL-CCNC: 51 U/L (ref 33–130)
ALT 1742-6: 15 U/L (ref 0–32)
AST 1920-8: 14 U/L (ref 0–35)
BILIRUB SERPL-MCNC: 0.5 MG/DL (ref 0.2–1.2)
BUN SERPL-MCNC: 17 MG/DL (ref 7–25)
BUN/CREATININE RATIO: 17 (ref 6–32)
CALCIUM SERPL-MCNC: 9.9 MG/DL (ref 8.6–10.3)
CHLORIDE SERPLBLD-SCNC: 102.3 MMOL/L (ref 98–110)
CHOLEST SERPL-MCNC: 129 MG/DL (ref 0–199)
CHOLEST/HDLC SERPL: 3 {RATIO} (ref 0–5)
CO2 SERPL-SCNC: 25.2 MMOL/L (ref 20–32)
CREAT SERPL-MCNC: 1.02 MG/DL (ref 0.6–1.3)
GLUCOSE SERPL-MCNC: 112 MG/DL (ref 60–99)
HDLC SERPL-MCNC: 47 MG/DL (ref 40–150)
HEMOGLOBIN A1C: 6 % (ref 4–5.6)
LDLC SERPL CALC-MCNC: 66 MG/DL (ref 0–129)
POTASSIUM SERPL-SCNC: 5.4 MMOL/L (ref 3.5–5.3)
PROT SERPL-MCNC: 7.1 G/DL (ref 6.1–8.1)
SODIUM SERPL-SCNC: 139 MMOL/L (ref 135–146)
TRIGL SERPL-MCNC: 79 MG/DL (ref 0–149)

## 2025-05-22 RX ORDER — TIRZEPATIDE 10 MG/.5ML
10 INJECTION, SOLUTION SUBCUTANEOUS
Qty: 2 ML | Refills: 0 | Status: SHIPPED | OUTPATIENT
Start: 2025-05-22

## 2025-05-22 RX ORDER — SIMVASTATIN 20 MG
20 TABLET ORAL AT BEDTIME
Qty: 90 TABLET | Refills: 1 | Status: SHIPPED | OUTPATIENT
Start: 2025-05-22

## 2025-05-22 RX ORDER — METFORMIN HYDROCHLORIDE 500 MG/1
1500 TABLET, EXTENDED RELEASE ORAL
Qty: 360 TABLET | Refills: 1 | Status: SHIPPED | OUTPATIENT
Start: 2025-05-22

## 2025-05-22 RX ORDER — LISINOPRIL 10 MG/1
10 TABLET ORAL DAILY
Qty: 90 TABLET | Refills: 1 | Status: SHIPPED | OUTPATIENT
Start: 2025-05-22

## 2025-05-22 NOTE — NURSING NOTE
Donis Barraza is here for fasting blood work and medication refill.  Questioned patient about current smoking habits.  Pt. has never smoked.  Body mass index is 33.67 kg/(m^2).  PULSE regular  My Chart: active    Pre-visit planning  Immunizations - up to date  Colonoscopy - is up to date  Mammogram -   Asthma -   PHQ9  DORCAS-7    The patient has verbalized that it is ok to leave a detailed voice message on the patient's cell phone with results/recommendations from this visit.

## 2025-05-22 NOTE — PROGRESS NOTES
Assessment & Plan     Type 2 diabetes mellitus without complication, without long-term current use of insulin (H)  Well controlled, continue current medications at current doses   - metFORMIN (GLUCOPHAGE XR) 500 MG 24 hr tablet  Dispense: 360 tablet; Refill: 1  - MOUNJARO 10 MG/0.5ML SOAJ auto-injector pen  Dispense: 2 mL; Refill: 0  - simvastatin (ZOCOR) 20 MG tablet  Dispense: 90 tablet; Refill: 1  - HEMOGLOBIN A1C (BFP)  - VENOUS COLLECTION    Mixed hyperlipidemia  Control uncertain, continue current medications at current doses   - simvastatin (ZOCOR) 20 MG tablet  Dispense: 90 tablet; Refill: 1  - Lipid Panel (BFP)  - Comprehensive Metobolic Panel (BFP)    HTN (hypertension), benign  Well controlled, continue current medications at current doses   - lisinopril (ZESTRIL) 10 MG tablet  Dispense: 90 tablet; Refill: 1  - Comprehensive Metobolic Panel (BFP)    Morbid obesity (H)  continue current medications at current doses Continue to work on healthy diet and exercise, discussed healthy habits   - MOUNJARO 10 MG/0.5ML SOAJ auto-injector pen  Dispense: 2 mL; Refill: 0    Special screening for malignant neoplasm of prostate      Special screening for malignant neoplasms, colon    - Colonoscopy Screening  Referral    Fatigue, unspecified type  Pt desires T check  - Testosterone Free and Total ([facility])              Follow-up       Dalton Dykes is a 65 year old, presenting for the following health issues:  Recheck Medication    HPI        Diabetes Follow-up    How often are you checking your blood sugar? Not at all  What concerns do you have today about your diabetes? None   Do you have any of these symptoms? (Select all that apply)  Numbness in feet  Have you had a diabetic eye exam in the last 12 months? yes            Hyperlipidemia Follow-Up    Are you regularly taking any medication or supplement to lower your cholesterol?   Yes- simvastatin  Are you having muscle aches or other side effects that  "you think could be caused by your cholesterol lowering medication?  No    Hypertension Follow-up    Do you check your blood pressure regularly outside of the clinic? Yes   Are you following a low salt diet? No  Are your blood pressures ever more than 140 on the top number (systolic) OR more   than 90 on the bottom number (diastolic), for example 140/90? No    BP Readings from Last 2 Encounters:   05/22/25 114/72   02/25/25 104/72     Hemoglobin A1C (%)   Date Value   12/05/2024 6.6 (A)   06/13/2024 7.3 (A)   12/05/2023 6.7   06/01/2023 6.2     LDL Cholesterol Calculated (mg/dL)   Date Value   02/02/2019 117 (H)   05/24/2013 157 (H)     LDL Cholesterol Direct (mg/dL)   Date Value   12/05/2023 158 (A)   03/06/2023 130     How many servings of fruits and vegetables do you eat daily?  2-3  On average, how many sweetened beverages do you drink each day (Examples: soda, juice, sweet tea, etc.  Do NOT count diet or artificially sweetened beverages)?   1  How many days per week do you exercise enough to make your heart beat faster? 6  How many minutes a day do you exercise enough to make your heart beat faster? 20 - 29  How many days per week do you miss taking your medication? 0        Review of Systems  Constitutional, HEENT, cardiovascular, pulmonary, gi and gu systems are negative, except as otherwise noted.      Objective    /72 (BP Location: Right arm, Patient Position: Chair, Cuff Size: Adult Regular)   Pulse 76   Temp 97.6  F (36.4  C) (Temporal)   Resp 20   Ht 1.702 m (5' 7\")   Wt 86.6 kg (191 lb)   BMI 29.91 kg/m    Body mass index is 29.91 kg/m .  Physical Exam   GENERAL: alert and no distress  EYES: Eyes grossly normal to inspection, PERRL and conjunctivae and sclerae normal  HENT: ear canals and TM's normal, nose and mouth without ulcers or lesions  NECK: no adenopathy, no asymmetry, masses, or scars  RESP: lungs clear to auscultation - no rales, rhonchi or wheezes  CV: regular rate and rhythm, " normal S1 S2, no S3 or S4, no murmur, click or rub, no peripheral edema  ABDOMEN: soft, nontender, no hepatosplenomegaly, no masses and bowel sounds normal  MS: no gross musculoskeletal defects noted, no edema    Results for orders placed or performed in visit on 05/22/25 (from the past 24 hours)   HEMOGLOBIN A1C (BFP)   Result Value Ref Range    Hemoglobin A1C 6.0 (A) 4 - 5.6 %           Signed Electronically by: Diego Guerra MD

## 2025-05-27 ENCOUNTER — RESULTS FOLLOW-UP (OUTPATIENT)
Dept: FAMILY MEDICINE | Facility: CLINIC | Age: 65
End: 2025-05-27

## 2025-05-27 LAB
TESTOSTERONE FREE LC/MS/MS - QUEST: 47.3 PG/ML (ref 35–155)
TESTOSTERONE, TOTAL, LC/MS/MS-QUEST: 510 NG/DL (ref 250–1100)

## 2025-06-04 ENCOUNTER — TRANSFERRED RECORDS (OUTPATIENT)
Dept: FAMILY MEDICINE | Facility: CLINIC | Age: 65
End: 2025-06-04

## 2025-06-09 PROBLEM — D12.6 ADENOMATOUS POLYP OF COLON: Status: ACTIVE | Noted: 2025-06-09

## 2025-06-18 DIAGNOSIS — E66.01 MORBID OBESITY (H): ICD-10-CM

## 2025-06-18 DIAGNOSIS — E11.9 TYPE 2 DIABETES MELLITUS WITHOUT COMPLICATION, WITHOUT LONG-TERM CURRENT USE OF INSULIN (H): ICD-10-CM

## 2025-06-18 RX ORDER — TIRZEPATIDE 10 MG/.5ML
10 INJECTION, SOLUTION SUBCUTANEOUS
Qty: 6 ML | Refills: 0 | Status: SHIPPED | OUTPATIENT
Start: 2025-06-18

## 2025-06-18 NOTE — TELEPHONE ENCOUNTER
----- Message from Coleen VICTORIA sent at 6/18/2025 10:33 AM CDT -----  Regarding: Mounjaro  Pt stopped in and stated he is out of [facility] and would like that sent in to his pharmacy.    Thanks!

## 2025-06-18 NOTE — TELEPHONE ENCOUNTER
Donis Barraza is requesting a refill of:    Pending Prescriptions:                       Disp   Refills    MOUNJARO 10 MG/0.5ML SOAJ auto-injector p*6 mL   0            Sig: Inject 0.5 mLs (10 mg) subcutaneously every 7           days.    Lab Results   Component Value Date    A1C 6.0 05/22/2025    A1C 6.6 12/05/2024    A1C 7.3 06/13/2024    A1C 6.7 12/05/2023    A1C 6.2 06/01/2023    A1C 7.9 03/06/2023    A1C 7.8 09/09/2022    A1C 7.5 03/17/2022

## 2025-07-16 ENCOUNTER — HOSPITAL ENCOUNTER (EMERGENCY)
Facility: CLINIC | Age: 65
Discharge: HOME | End: 2025-07-16
Attending: EMERGENCY MEDICINE
Payer: COMMERCIAL

## 2025-07-16 ENCOUNTER — APPOINTMENT (OUTPATIENT)
Dept: CT IMAGING | Facility: CLINIC | Age: 65
End: 2025-07-16
Attending: EMERGENCY MEDICINE
Payer: COMMERCIAL

## 2025-07-16 VITALS
OXYGEN SATURATION: 98 % | TEMPERATURE: 97.1 F | WEIGHT: 188.71 LBS | DIASTOLIC BLOOD PRESSURE: 76 MMHG | BODY MASS INDEX: 29.62 KG/M2 | HEART RATE: 80 BPM | RESPIRATION RATE: 18 BRPM | HEIGHT: 67 IN | SYSTOLIC BLOOD PRESSURE: 116 MMHG

## 2025-07-16 DIAGNOSIS — N20.0 KIDNEY STONE: ICD-10-CM

## 2025-07-16 LAB
ALBUMIN UR-MCNC: NEGATIVE MG/DL
ANION GAP SERPL CALCULATED.3IONS-SCNC: 12 MMOL/L (ref 7–15)
APPEARANCE UR: CLEAR
BASOPHILS # BLD AUTO: 0.1 10E3/UL (ref 0–0.2)
BASOPHILS NFR BLD AUTO: 0 %
BILIRUB UR QL STRIP: NEGATIVE
BUN SERPL-MCNC: 26.2 MG/DL (ref 8–23)
CALCIUM SERPL-MCNC: 9.7 MG/DL (ref 8.8–10.4)
CHLORIDE SERPL-SCNC: 102 MMOL/L (ref 98–107)
COLOR UR AUTO: ABNORMAL
CREAT SERPL-MCNC: 1.26 MG/DL (ref 0.67–1.17)
EGFRCR SERPLBLD CKD-EPI 2021: 64 ML/MIN/1.73M2
EOSINOPHIL # BLD AUTO: 0.1 10E3/UL (ref 0–0.7)
EOSINOPHIL NFR BLD AUTO: 0 %
ERYTHROCYTE [DISTWIDTH] IN BLOOD BY AUTOMATED COUNT: 12.1 % (ref 10–15)
GLUCOSE SERPL-MCNC: 209 MG/DL (ref 70–99)
GLUCOSE UR STRIP-MCNC: NEGATIVE MG/DL
HCO3 SERPL-SCNC: 22 MMOL/L (ref 22–29)
HCT VFR BLD AUTO: 42.7 % (ref 40–53)
HGB BLD-MCNC: 14.7 G/DL (ref 13.3–17.7)
HGB UR QL STRIP: ABNORMAL
HOLD SPECIMEN: NORMAL
HOLD SPECIMEN: NORMAL
IMM GRANULOCYTES # BLD: 0 10E3/UL
IMM GRANULOCYTES NFR BLD: 0 %
KETONES UR STRIP-MCNC: NEGATIVE MG/DL
LEUKOCYTE ESTERASE UR QL STRIP: NEGATIVE
LYMPHOCYTES # BLD AUTO: 1.8 10E3/UL (ref 0.8–5.3)
LYMPHOCYTES NFR BLD AUTO: 13 %
MCH RBC QN AUTO: 30.2 PG (ref 26.5–33)
MCHC RBC AUTO-ENTMCNC: 34.4 G/DL (ref 31.5–36.5)
MCV RBC AUTO: 88 FL (ref 78–100)
MONOCYTES # BLD AUTO: 0.8 10E3/UL (ref 0–1.3)
MONOCYTES NFR BLD AUTO: 6 %
MUCOUS THREADS #/AREA URNS LPF: PRESENT /LPF
NEUTROPHILS # BLD AUTO: 10.8 10E3/UL (ref 1.6–8.3)
NEUTROPHILS NFR BLD AUTO: 80 %
NITRATE UR QL: NEGATIVE
NRBC # BLD AUTO: 0 10E3/UL
NRBC BLD AUTO-RTO: 0 /100
PH UR STRIP: 5 [PH] (ref 5–7)
PLATELET # BLD AUTO: 279 10E3/UL (ref 150–450)
POTASSIUM SERPL-SCNC: 5.3 MMOL/L (ref 3.4–5.3)
RBC # BLD AUTO: 4.86 10E6/UL (ref 4.4–5.9)
RBC URINE: 18 /HPF
SODIUM SERPL-SCNC: 136 MMOL/L (ref 135–145)
SP GR UR STRIP: 1.02 (ref 1–1.03)
UROBILINOGEN UR STRIP-MCNC: NORMAL MG/DL
WBC # BLD AUTO: 13.5 10E3/UL (ref 4–11)
WBC URINE: 1 /HPF

## 2025-07-16 PROCEDURE — 96374 THER/PROPH/DIAG INJ IV PUSH: CPT | Performed by: EMERGENCY MEDICINE

## 2025-07-16 PROCEDURE — 96375 TX/PRO/DX INJ NEW DRUG ADDON: CPT | Performed by: EMERGENCY MEDICINE

## 2025-07-16 PROCEDURE — 80048 BASIC METABOLIC PNL TOTAL CA: CPT | Performed by: EMERGENCY MEDICINE

## 2025-07-16 PROCEDURE — 74176 CT ABD & PELVIS W/O CONTRAST: CPT

## 2025-07-16 PROCEDURE — 258N000003 HC RX IP 258 OP 636: Performed by: EMERGENCY MEDICINE

## 2025-07-16 PROCEDURE — 85025 COMPLETE CBC W/AUTO DIFF WBC: CPT | Performed by: EMERGENCY MEDICINE

## 2025-07-16 PROCEDURE — 99285 EMERGENCY DEPT VISIT HI MDM: CPT | Mod: 25 | Performed by: EMERGENCY MEDICINE

## 2025-07-16 PROCEDURE — 96361 HYDRATE IV INFUSION ADD-ON: CPT | Performed by: EMERGENCY MEDICINE

## 2025-07-16 PROCEDURE — 250N000011 HC RX IP 250 OP 636: Performed by: EMERGENCY MEDICINE

## 2025-07-16 PROCEDURE — 81001 URINALYSIS AUTO W/SCOPE: CPT | Performed by: EMERGENCY MEDICINE

## 2025-07-16 PROCEDURE — 36415 COLL VENOUS BLD VENIPUNCTURE: CPT | Performed by: EMERGENCY MEDICINE

## 2025-07-16 RX ORDER — ONDANSETRON 2 MG/ML
4 INJECTION INTRAMUSCULAR; INTRAVENOUS EVERY 30 MIN PRN
Status: DISCONTINUED | OUTPATIENT
Start: 2025-07-16 | End: 2025-07-16 | Stop reason: HOSPADM

## 2025-07-16 RX ORDER — OXYCODONE HYDROCHLORIDE 5 MG/1
5 TABLET ORAL EVERY 6 HOURS PRN
Qty: 12 TABLET | Refills: 0 | Status: SHIPPED | OUTPATIENT
Start: 2025-07-16 | End: 2025-07-18

## 2025-07-16 RX ORDER — KETOROLAC TROMETHAMINE 15 MG/ML
15 INJECTION, SOLUTION INTRAMUSCULAR; INTRAVENOUS ONCE
Status: COMPLETED | OUTPATIENT
Start: 2025-07-16 | End: 2025-07-16

## 2025-07-16 RX ORDER — ONDANSETRON 2 MG/ML
4 INJECTION INTRAMUSCULAR; INTRAVENOUS ONCE
Status: COMPLETED | OUTPATIENT
Start: 2025-07-16 | End: 2025-07-16

## 2025-07-16 RX ORDER — MORPHINE SULFATE 4 MG/ML
4 INJECTION, SOLUTION INTRAMUSCULAR; INTRAVENOUS
Refills: 0 | Status: DISCONTINUED | OUTPATIENT
Start: 2025-07-16 | End: 2025-07-16 | Stop reason: HOSPADM

## 2025-07-16 RX ORDER — TAMSULOSIN HYDROCHLORIDE 0.4 MG/1
0.4 CAPSULE ORAL DAILY
Qty: 10 CAPSULE | Refills: 0 | Status: SHIPPED | OUTPATIENT
Start: 2025-07-16 | End: 2025-07-24

## 2025-07-16 RX ORDER — ONDANSETRON 4 MG/1
4 TABLET, ORALLY DISINTEGRATING ORAL EVERY 8 HOURS PRN
Qty: 10 TABLET | Refills: 0 | Status: SHIPPED | OUTPATIENT
Start: 2025-07-16 | End: 2025-07-18

## 2025-07-16 RX ADMIN — KETOROLAC TROMETHAMINE 15 MG: 15 INJECTION, SOLUTION INTRAMUSCULAR; INTRAVENOUS at 08:21

## 2025-07-16 RX ADMIN — SODIUM CHLORIDE 1000 ML: 0.9 INJECTION, SOLUTION INTRAVENOUS at 08:59

## 2025-07-16 RX ADMIN — ONDANSETRON 4 MG: 2 INJECTION, SOLUTION INTRAMUSCULAR; INTRAVENOUS at 08:20

## 2025-07-16 ASSESSMENT — ACTIVITIES OF DAILY LIVING (ADL)
ADLS_ACUITY_SCORE: 41

## 2025-07-16 ASSESSMENT — COLUMBIA-SUICIDE SEVERITY RATING SCALE - C-SSRS
1. IN THE PAST MONTH, HAVE YOU WISHED YOU WERE DEAD OR WISHED YOU COULD GO TO SLEEP AND NOT WAKE UP?: NO
6. HAVE YOU EVER DONE ANYTHING, STARTED TO DO ANYTHING, OR PREPARED TO DO ANYTHING TO END YOUR LIFE?: NO
2. HAVE YOU ACTUALLY HAD ANY THOUGHTS OF KILLING YOURSELF IN THE PAST MONTH?: NO

## 2025-07-16 NOTE — DISCHARGE INSTRUCTIONS

## 2025-07-16 NOTE — ED TRIAGE NOTES
Pt arrives with c/o sudden onset of right flank pain that radiates into groin that started at 2am. Pt endorses nausea. Pt restless and obviously uncomfortable in triage.      Triage Assessment (Adult)       Row Name 07/16/25 0814          Triage Assessment    Airway WDL WDL        Respiratory WDL    Respiratory WDL WDL        Skin Circulation/Temperature WDL    Skin Circulation/Temperature WDL WDL        Cardiac WDL    Cardiac WDL WDL        Peripheral/Neurovascular WDL    Peripheral Neurovascular WDL WDL        Cognitive/Neuro/Behavioral WDL    Cognitive/Neuro/Behavioral WDL WDL

## 2025-07-16 NOTE — ED PROVIDER NOTES
"  Emergency Department Note      History of Present Illness     Chief Complaint   Flank Pain    HPI   Donis Barraza is a 65 year old male with a history of hypertension, T2DM and hyperlipidemia presenting with L sided flank pain. He reports that his pain started at around 0200 this morning which he describes as being stabbed with a spear. Denies fever or pain with urination. History of appendectomy. No history of kidney stones.      Independent Historian   None    Past Medical History     Medical History and Problem List   T2DM   Hyperlipidemia   Hypertension   Arthritis     Medications   Lisinopril   Simvastatin   Mounjaro   Metformin     Surgical History   Knee arthroscopy  Shoulder surgery  Tonsillectomy   Appendectomy     Physical Exam     Patient Vitals for the past 24 hrs:   BP Temp Temp src Pulse Resp SpO2 Height Weight   07/16/25 1105 116/76 -- -- 80 18 98 % -- --   07/16/25 0813 (!) 139/106 97.1  F (36.2  C) Temporal 86 18 100 % 1.702 m (5' 7\") 85.6 kg (188 lb 11.4 oz)     Physical Exam  General: Patient is awake, alert and interactive when I enter the room. Appears uncomfortable.   Head: The scalp, face, and head appear normal  Eyes: Conjunctivae and sclerae are normal  Neck: Normal range of motion.   CV: Regular rate.   Resp:  No respiratory distress.   GI: abdomen is soft, no rigidity. No evidence of pulsatile mass. No fluid waves or evidence of ascites. No distension. No hernias or bruising are noted in detailed exam. No CVA tenderness.    MS: Normal tone.   Skin: Normal capillary refill noted  Neuro: Speech is normal and fluent. Face is symmetric. Moving all extremities.   Psych:  Normal affect.  Appropriate interactions.      Diagnostics     Lab Results   Labs Ordered and Resulted from Time of ED Arrival to Time of ED Departure   ROUTINE UA WITH MICROSCOPIC REFLEX TO CULTURE - Abnormal       Result Value    Color Urine Light Yellow      Appearance Urine Clear      Glucose Urine Negative      " Bilirubin Urine Negative      Ketones Urine Negative      Specific Gravity Urine 1.024      Blood Urine Small (*)     pH Urine 5.0      Protein Albumin Urine Negative      Urobilinogen Urine Normal      Nitrite Urine Negative      Leukocyte Esterase Urine Negative      Mucus Urine Present (*)     RBC Urine 18 (*)     WBC Urine 1     BASIC METABOLIC PANEL - Abnormal    Sodium 136      Potassium 5.3      Chloride 102      Carbon Dioxide (CO2) 22      Anion Gap 12      Urea Nitrogen 26.2 (*)     Creatinine 1.26 (*)     GFR Estimate 64      Calcium 9.7      Glucose 209 (*)    CBC WITH PLATELETS AND DIFFERENTIAL - Abnormal    WBC Count 13.5 (*)     RBC Count 4.86      Hemoglobin 14.7      Hematocrit 42.7      MCV 88      MCH 30.2      MCHC 34.4      RDW 12.1      Platelet Count 279      % Neutrophils 80      % Lymphocytes 13      % Monocytes 6      % Eosinophils 0      % Basophils 0      % Immature Granulocytes 0      NRBCs per 100 WBC 0      Absolute Neutrophils 10.8 (*)     Absolute Lymphocytes 1.8      Absolute Monocytes 0.8      Absolute Eosinophils 0.1      Absolute Basophils 0.1      Absolute Immature Granulocytes 0.0      Absolute NRBCs 0.0         Imaging   CT Abdomen Pelvis w/o Contrast   Final Result   IMPRESSION:    1.  Distal right ureteral calculus measuring up to 6 mm resulting in mild right-sided hydronephrosis.             Independent Interpretation   None    ED Course      Medications Administered   Medications   morphine (PF) injection 4 mg (has no administration in time range)   ondansetron (ZOFRAN) injection 4 mg (has no administration in time range)   ondansetron (ZOFRAN) injection 4 mg (4 mg Intravenous $Given 7/16/25 0820)   ketorolac (TORADOL) injection 15 mg (15 mg Intravenous $Given 7/16/25 0821)   sodium chloride 0.9% BOLUS 1,000 mL (0 mLs Intravenous Stopped 7/16/25 1105)       Procedures   Procedures     Discussion of Management   None    ED Course   ED Course as of 07/16/25 1130   Wed Jul  16, 2025   2662 I obtained the history and examined the patient as noted above.          Additional Documentation  None    Medical Decision Making / Diagnosis       LINDSAY Barraza is a 65 year old male who presented with unilateral flank & abdominal pain consistent with renal colic. CT confirms a 6 mm ureteral stone at the distal ureter.  Renal function is normal/baseline.  CT and lab workup show no other alternative etiology that could be causing his symptoms (e.g., AAA, appendicitis, pyelonephritis). There is no fever or convincing evidence of a urinary tract infection as the UA is reassuring. On recheck, his pain is controlled with toradol in the ED and he is tolerating POs. I will prescribe oxycodone, zofran and flomax. I have advised him to return for uncontrolled pain, vomiting, fever, or any other concerning symptoms. I also advised to strain his urine to look for a stone and submit it to his primary doctor for lab analysis.  Finally, I have advised follow up with urology within 3-5 days.     Disposition   The patient was discharged.     Diagnosis     ICD-10-CM    1. Kidney stone  N20.0          Discharge Medications   Discharge Medication List as of 7/16/2025 11:07 AM        START taking these medications    Details   ondansetron (ZOFRAN ODT) 4 MG ODT tab Take 1 tablet (4 mg) by mouth every 8 hours as needed for nausea., Disp-10 tablet, R-0, Local Print      oxyCODONE (ROXICODONE) 5 MG tablet Take 1 tablet (5 mg) by mouth every 6 hours as needed for breakthrough pain., Disp-12 tablet, R-0, Local Print      tamsulosin (FLOMAX) 0.4 MG capsule Take 1 capsule (0.4 mg) by mouth daily., Disp-10 capsule, R-0, Local Print           Scribe Disclosure:  I, Essie Vicente, am serving as a scribe at 8:58 AM on 7/16/2025 to document services personally performed by Celestino Franz MD based on my observations and the provider's statements to me.        Celestino Franz MD  07/16/25 1200

## 2025-08-09 ENCOUNTER — HOSPITAL ENCOUNTER (OUTPATIENT)
Dept: CT IMAGING | Facility: CLINIC | Age: 65
Discharge: HOME | End: 2025-08-09
Attending: STUDENT IN AN ORGANIZED HEALTH CARE EDUCATION/TRAINING PROGRAM | Admitting: STUDENT IN AN ORGANIZED HEALTH CARE EDUCATION/TRAINING PROGRAM
Payer: COMMERCIAL

## 2025-08-09 DIAGNOSIS — N20.1 RIGHT URETERAL STONE: ICD-10-CM

## 2025-08-09 PROCEDURE — 74176 CT ABD & PELVIS W/O CONTRAST: CPT

## 2025-08-10 DIAGNOSIS — I10 HTN (HYPERTENSION), BENIGN: ICD-10-CM

## 2025-08-13 RX ORDER — LISINOPRIL 10 MG/1
10 TABLET ORAL DAILY
COMMUNITY
Start: 2025-08-13

## 2025-08-18 ENCOUNTER — TELEPHONE (OUTPATIENT)
Dept: UROLOGY | Facility: CLINIC | Age: 65
End: 2025-08-18
Payer: COMMERCIAL

## (undated) DEVICE — LINEN HALF SHEET 5512

## (undated) DEVICE — GLOVE PROTEXIS POWDER FREE 8.5 ORTHOPEDIC 2D73ET85

## (undated) DEVICE — GLOVE PROTEXIS W/NEU-THERA 8.5  2D73TE85

## (undated) DEVICE — SYR BULB IRRIG 50ML LATEX FREE 0035280

## (undated) DEVICE — DRSG ADAPTIC 3X8" 6113

## (undated) DEVICE — GLOVE PROTEXIS BLUE W/NEU-THERA 8.5  2D73EB85

## (undated) DEVICE — SOL NACL 0.9% 20ML VIAL

## (undated) DEVICE — ESU GROUND PAD ADULT W/CORD E7507

## (undated) DEVICE — PACK SHOULDER RIDGES

## (undated) DEVICE — GLOVE PROTEXIS BLUE W/NEU-THERA 7.0  2D73EB70

## (undated) DEVICE — PACK ARTHROSCOPY KNEE

## (undated) DEVICE — DRSG ABDOMINAL 07 1/2X8" 7197D

## (undated) DEVICE — DRSG STERI STRIP 1/2X4" R1547

## (undated) DEVICE — BAG CLEAR TRASH 1.3M 39X33" P4040C

## (undated) DEVICE — GLOVE PROTEXIS W/NEU-THERA 7.0  2D73TE70

## (undated) DEVICE — GLOVE PROTEXIS POWDER FREE SMT 7.0  2D72PT70X

## (undated) DEVICE — SUCTION MANIFOLD NEPTUNE 2 SYS 4 PORT 0702-020-000

## (undated) DEVICE — NDL BLUNT 18GA 1" W/O FILTER 305181

## (undated) DEVICE — GLOVE PROTEXIS POWDER FREE 7.0 ORTHOPEDIC 2D73ET70

## (undated) DEVICE — STPL SKIN 35W 6.9MM  PXW35

## (undated) DEVICE — TOURNIQUET CUFF 30" REPRO BLUE 60-7070-105

## (undated) DEVICE — SOL NACL 0.9% IRRIG 3000ML BAG 2B7477

## (undated) DEVICE — LINEN FULL SHEET 5511

## (undated) DEVICE — DRSG GAUZE 4X4" 8044

## (undated) DEVICE — PAD FOAM RING KNEE 7209458

## (undated) DEVICE — DRAPE CONVERTORS U-DRAPE 60X72" 8476

## (undated) DEVICE — Device

## (undated) DEVICE — NDL BLUNT 18GA 1.5" FILTER 305211

## (undated) DEVICE — CAST PADDING 6" STERILE 9046S

## (undated) DEVICE — LINEN ORTHO ACL PACK 5447

## (undated) DEVICE — BLADE SHAVER ARTHRO 4.2MM CUDA C9254

## (undated) DEVICE — SU VICRYL 0 CT-1 36" J346H

## (undated) DEVICE — TUBING IRRIG TUR Y TYPE 96" LF 6543-01

## (undated) DEVICE — ESU ARTHROWAND 90DEG RF AMBIENT SUPER TURBOVAC ASHA4250-01

## (undated) DEVICE — SOL ADH LIQUID BENZOIN SWAB 0.6ML C1544

## (undated) DEVICE — SU VICRYL 4-0 PS-2 18" UND J496H

## (undated) DEVICE — SYR 01ML 25GA 5/8" TBC

## (undated) DEVICE — PEN MARKING SKIN

## (undated) DEVICE — NDL SPINAL 18GA 3.5" 405184

## (undated) DEVICE — DRAPE ARTHROSCOPY SHOULDER BEACHCHAIR 29369

## (undated) DEVICE — SU VICRYL 2-0 CT-1 27" UND J259H

## (undated) DEVICE — ESU PENCIL W/HOLSTER E2350H

## (undated) DEVICE — COVER FOOTSWITCH W/CINCH 20X24" 923267

## (undated) DEVICE — SYR 30ML LL W/O NDL 302832

## (undated) RX ORDER — NEOSTIGMINE METHYLSULFATE 1 MG/ML
VIAL (ML) INJECTION
Status: DISPENSED
Start: 2020-02-10

## (undated) RX ORDER — GLYCOPYRROLATE 0.2 MG/ML
INJECTION INTRAMUSCULAR; INTRAVENOUS
Status: DISPENSED
Start: 2020-02-10

## (undated) RX ORDER — CEFAZOLIN SODIUM 2 G/100ML
INJECTION, SOLUTION INTRAVENOUS
Status: DISPENSED
Start: 2020-06-22

## (undated) RX ORDER — TRAMADOL HYDROCHLORIDE 50 MG/1
TABLET ORAL
Status: DISPENSED
Start: 2020-02-10

## (undated) RX ORDER — DEXAMETHASONE SODIUM PHOSPHATE 4 MG/ML
INJECTION, SOLUTION INTRA-ARTICULAR; INTRALESIONAL; INTRAMUSCULAR; INTRAVENOUS; SOFT TISSUE
Status: DISPENSED
Start: 2020-06-22

## (undated) RX ORDER — PROPOFOL 10 MG/ML
INJECTION, EMULSION INTRAVENOUS
Status: DISPENSED
Start: 2020-02-10

## (undated) RX ORDER — BUPIVACAINE HYDROCHLORIDE 2.5 MG/ML
INJECTION, SOLUTION EPIDURAL; INFILTRATION; INTRACAUDAL
Status: DISPENSED
Start: 2020-06-22

## (undated) RX ORDER — LIDOCAINE HYDROCHLORIDE 10 MG/ML
INJECTION, SOLUTION EPIDURAL; INFILTRATION; INTRACAUDAL; PERINEURAL
Status: DISPENSED
Start: 2020-02-10

## (undated) RX ORDER — KETOROLAC TROMETHAMINE 30 MG/ML
INJECTION, SOLUTION INTRAMUSCULAR; INTRAVENOUS
Status: DISPENSED
Start: 2020-06-22

## (undated) RX ORDER — FENTANYL CITRATE 50 UG/ML
INJECTION, SOLUTION INTRAMUSCULAR; INTRAVENOUS
Status: DISPENSED
Start: 2020-06-22

## (undated) RX ORDER — ACETAMINOPHEN 325 MG/1
TABLET ORAL
Status: DISPENSED
Start: 2020-02-10

## (undated) RX ORDER — DEXAMETHASONE SODIUM PHOSPHATE 4 MG/ML
INJECTION, SOLUTION INTRA-ARTICULAR; INTRALESIONAL; INTRAMUSCULAR; INTRAVENOUS; SOFT TISSUE
Status: DISPENSED
Start: 2020-02-10

## (undated) RX ORDER — GLYCOPYRROLATE 0.2 MG/ML
INJECTION INTRAMUSCULAR; INTRAVENOUS
Status: DISPENSED
Start: 2020-06-22

## (undated) RX ORDER — ONDANSETRON 2 MG/ML
INJECTION INTRAMUSCULAR; INTRAVENOUS
Status: DISPENSED
Start: 2020-06-22

## (undated) RX ORDER — LIDOCAINE HYDROCHLORIDE 10 MG/ML
INJECTION, SOLUTION EPIDURAL; INFILTRATION; INTRACAUDAL; PERINEURAL
Status: DISPENSED
Start: 2020-06-22

## (undated) RX ORDER — PROPOFOL 10 MG/ML
INJECTION, EMULSION INTRAVENOUS
Status: DISPENSED
Start: 2020-06-22

## (undated) RX ORDER — FENTANYL CITRATE 50 UG/ML
INJECTION, SOLUTION INTRAMUSCULAR; INTRAVENOUS
Status: DISPENSED
Start: 2020-02-10

## (undated) RX ORDER — BUPIVACAINE HYDROCHLORIDE 2.5 MG/ML
INJECTION, SOLUTION EPIDURAL; INFILTRATION; INTRACAUDAL
Status: DISPENSED
Start: 2020-02-10

## (undated) RX ORDER — EPINEPHRINE 1 MG/ML(1)
AMPUL (ML) INJECTION
Status: DISPENSED
Start: 2020-02-10

## (undated) RX ORDER — ONDANSETRON 2 MG/ML
INJECTION INTRAMUSCULAR; INTRAVENOUS
Status: DISPENSED
Start: 2020-02-10

## (undated) RX ORDER — CEFAZOLIN SODIUM 2 G/100ML
INJECTION, SOLUTION INTRAVENOUS
Status: DISPENSED
Start: 2020-02-10